# Patient Record
Sex: FEMALE | Race: OTHER | Employment: FULL TIME | URBAN - METROPOLITAN AREA
[De-identification: names, ages, dates, MRNs, and addresses within clinical notes are randomized per-mention and may not be internally consistent; named-entity substitution may affect disease eponyms.]

---

## 2018-01-23 PROCEDURE — 88175 CYTOPATH C/V AUTO FLUID REDO: CPT | Performed by: OBSTETRICS & GYNECOLOGY

## 2018-01-23 PROCEDURE — 87624 HPV HI-RISK TYP POOLED RSLT: CPT | Performed by: OBSTETRICS & GYNECOLOGY

## 2018-01-23 PROCEDURE — 87491 CHLMYD TRACH DNA AMP PROBE: CPT | Performed by: OBSTETRICS & GYNECOLOGY

## 2018-01-23 PROCEDURE — 85025 COMPLETE CBC W/AUTO DIFF WBC: CPT | Performed by: OBSTETRICS & GYNECOLOGY

## 2018-01-23 PROCEDURE — 87591 N.GONORRHOEAE DNA AMP PROB: CPT | Performed by: OBSTETRICS & GYNECOLOGY

## 2018-01-24 ENCOUNTER — LAB REQUISITION (OUTPATIENT)
Dept: LAB | Facility: HOSPITAL | Age: 28
End: 2018-01-24
Payer: COMMERCIAL

## 2018-01-24 DIAGNOSIS — Z01.419 ENCOUNTER FOR GYNECOLOGICAL EXAMINATION WITHOUT ABNORMAL FINDING: ICD-10-CM

## 2018-01-24 DIAGNOSIS — N72 INFLAMMATORY DISEASE OF UTERINE CERVIX: ICD-10-CM

## 2018-01-24 DIAGNOSIS — D50.8 OTHER IRON DEFICIENCY ANEMIAS: ICD-10-CM

## 2018-01-24 LAB
BASOPHILS # BLD: 0.1 K/UL (ref 0–0.2)
BASOPHILS NFR BLD: 1 %
EOSINOPHIL # BLD: 0.1 K/UL (ref 0–0.7)
EOSINOPHIL NFR BLD: 2 %
ERYTHROCYTE [DISTWIDTH] IN BLOOD BY AUTOMATED COUNT: 15.1 % (ref 11–15)
HCT VFR BLD AUTO: 42.8 % (ref 35–48)
HGB BLD-MCNC: 13.8 G/DL (ref 12–16)
LYMPHOCYTES # BLD: 2.4 K/UL (ref 1–4)
LYMPHOCYTES NFR BLD: 32 %
MCH RBC QN AUTO: 29.6 PG (ref 27–32)
MCHC RBC AUTO-ENTMCNC: 32.3 G/DL (ref 32–37)
MCV RBC AUTO: 91.4 FL (ref 80–100)
MONOCYTES # BLD: 0.4 K/UL (ref 0–1)
MONOCYTES NFR BLD: 6 %
NEUTROPHILS # BLD AUTO: 4.5 K/UL (ref 1.8–7.7)
NEUTROPHILS NFR BLD: 60 %
PLATELET # BLD AUTO: 260 K/UL (ref 140–400)
PMV BLD AUTO: 10.9 FL (ref 7.4–10.3)
RBC # BLD AUTO: 4.68 M/UL (ref 3.7–5.4)
WBC # BLD AUTO: 7.6 K/UL (ref 4–11)

## 2018-01-25 LAB
C TRACH DNA SPEC QL NAA+PROBE: NEGATIVE
HPV I/H RISK 1 DNA SPEC QL NAA+PROBE: NEGATIVE
N GONORRHOEA DNA SPEC QL NAA+PROBE: NEGATIVE

## 2018-01-31 ENCOUNTER — HOSPITAL ENCOUNTER (OUTPATIENT)
Dept: ULTRASOUND IMAGING | Facility: HOSPITAL | Age: 28
Discharge: HOME | End: 2018-01-31
Payer: COMMERCIAL

## 2018-01-31 DIAGNOSIS — R10.2 PELVIC PAIN: ICD-10-CM

## 2018-01-31 PROCEDURE — 93975 VASCULAR STUDY: CPT | Performed by: OBSTETRICS & GYNECOLOGY

## 2018-01-31 PROCEDURE — 76830 TRANSVAGINAL US NON-OB: CPT | Performed by: OBSTETRICS & GYNECOLOGY

## 2018-01-31 PROCEDURE — 93975 VASCULAR STUDY: CPT

## 2018-01-31 PROCEDURE — 76856 US EXAM PELVIC COMPLETE: CPT

## 2018-01-31 PROCEDURE — 76856 US EXAM PELVIC COMPLETE: CPT | Performed by: OBSTETRICS & GYNECOLOGY

## 2018-01-31 PROCEDURE — 76830 TRANSVAGINAL US NON-OB: CPT

## 2018-02-06 ENCOUNTER — LAB REQUISITION (OUTPATIENT)
Dept: LAB | Facility: HOSPITAL | Age: 28
End: 2018-02-06
Payer: COMMERCIAL

## 2018-02-06 DIAGNOSIS — N92.0 EXCESSIVE AND FREQUENT MENSTRUATION WITH REGULAR CYCLE: ICD-10-CM

## 2018-02-06 LAB — PROGEST SERPL-MCNC: 9.6 NG/ML

## 2018-02-06 PROCEDURE — 84144 ASSAY OF PROGESTERONE: CPT | Performed by: OBSTETRICS & GYNECOLOGY

## 2018-02-06 PROCEDURE — 88305 TISSUE EXAM BY PATHOLOGIST: CPT | Performed by: OBSTETRICS & GYNECOLOGY

## 2018-02-13 ENCOUNTER — LAB ENCOUNTER (OUTPATIENT)
Dept: LAB | Facility: HOSPITAL | Age: 28
End: 2018-02-13
Attending: OBSTETRICS & GYNECOLOGY
Payer: COMMERCIAL

## 2018-02-13 DIAGNOSIS — N93.8 DUB (DYSFUNCTIONAL UTERINE BLEEDING): Primary | ICD-10-CM

## 2018-02-13 LAB
ESTRADIOL SERPL-MCNC: 28 PG/ML
FSH SERPL-ACNC: 3.3 MIU/ML
LH SERPL-ACNC: 1.2 MIU/ML
TSH SERPL-ACNC: 1.44 UIU/ML (ref 0.45–5.33)

## 2018-02-13 PROCEDURE — 83001 ASSAY OF GONADOTROPIN (FSH): CPT

## 2018-02-13 PROCEDURE — 84443 ASSAY THYROID STIM HORMONE: CPT

## 2018-02-13 PROCEDURE — 83002 ASSAY OF GONADOTROPIN (LH): CPT

## 2018-02-13 PROCEDURE — 82670 ASSAY OF TOTAL ESTRADIOL: CPT

## 2018-02-13 PROCEDURE — 36415 COLL VENOUS BLD VENIPUNCTURE: CPT

## 2018-02-26 ENCOUNTER — LAB REQUISITION (OUTPATIENT)
Dept: LAB | Facility: HOSPITAL | Age: 28
End: 2018-02-26
Payer: COMMERCIAL

## 2018-02-26 DIAGNOSIS — N39.0 URINARY TRACT INFECTION: ICD-10-CM

## 2018-02-26 LAB
BACTERIA UR QL AUTO: NEGATIVE /HPF
BILIRUB UR QL: NEGATIVE
CLARITY UR: CLEAR
COLOR UR: YELLOW
GLUCOSE UR-MCNC: NEGATIVE MG/DL
KETONES UR-MCNC: NEGATIVE MG/DL
NITRITE UR QL STRIP.AUTO: NEGATIVE
PH UR: 6 [PH] (ref 5–8)
PROT UR-MCNC: NEGATIVE MG/DL
RBC #/AREA URNS AUTO: 6 /HPF
SP GR UR STRIP: 1.02 (ref 1–1.03)
UROBILINOGEN UR STRIP-ACNC: <2
VIT C UR-MCNC: NEGATIVE MG/DL
WBC #/AREA URNS AUTO: 15 /HPF

## 2018-02-26 PROCEDURE — 87086 URINE CULTURE/COLONY COUNT: CPT | Performed by: OBSTETRICS & GYNECOLOGY

## 2018-02-26 PROCEDURE — 87186 SC STD MICRODIL/AGAR DIL: CPT | Performed by: OBSTETRICS & GYNECOLOGY

## 2018-02-26 PROCEDURE — 81001 URINALYSIS AUTO W/SCOPE: CPT | Performed by: OBSTETRICS & GYNECOLOGY

## 2018-02-26 PROCEDURE — 87088 URINE BACTERIA CULTURE: CPT | Performed by: OBSTETRICS & GYNECOLOGY

## 2019-04-24 ENCOUNTER — OFFICE VISIT (OUTPATIENT)
Dept: FAMILY MEDICINE CLINIC | Facility: CLINIC | Age: 29
End: 2019-04-24

## 2019-04-24 VITALS
HEIGHT: 60 IN | BODY MASS INDEX: 38.09 KG/M2 | SYSTOLIC BLOOD PRESSURE: 120 MMHG | WEIGHT: 194 LBS | OXYGEN SATURATION: 98 % | DIASTOLIC BLOOD PRESSURE: 70 MMHG | HEART RATE: 96 BPM

## 2019-04-24 DIAGNOSIS — Z01.89 ENCOUNTER FOR ROUTINE LABORATORY TESTING: ICD-10-CM

## 2019-04-24 DIAGNOSIS — J02.9 ACUTE VIRAL PHARYNGITIS: Primary | ICD-10-CM

## 2019-04-24 PROBLEM — E66.812 CLASS 2 OBESITY DUE TO EXCESS CALORIES WITHOUT SERIOUS COMORBIDITY WITH BODY MASS INDEX (BMI) OF 37.0 TO 37.9 IN ADULT: Status: ACTIVE | Noted: 2019-04-24

## 2019-04-24 PROBLEM — E66.09 CLASS 2 OBESITY DUE TO EXCESS CALORIES WITHOUT SERIOUS COMORBIDITY WITH BODY MASS INDEX (BMI) OF 37.0 TO 37.9 IN ADULT: Status: ACTIVE | Noted: 2019-04-24

## 2019-04-24 PROCEDURE — 99202 OFFICE O/P NEW SF 15 MIN: CPT

## 2019-04-24 PROCEDURE — 87880 STREP A ASSAY W/OPTIC: CPT

## 2019-04-24 NOTE — PROGRESS NOTES
HPI:    Patient ID: Zamzam Smith is a 33year old female. Sore Throat    This is a new problem. Episode onset: 3 days ago. The problem has been gradually worsening. The pain is worse on the right side. Maximum temperature: unmeasured.  The fever has be Hearing, tympanic membrane, external ear and ear canal normal.   Nose: Mucosal edema present. No rhinorrhea. Mouth/Throat: Uvula is midline and mucous membranes are normal. Posterior oropharyngeal erythema present. No tonsillar abscesses.    B/L enlarged

## 2019-04-24 NOTE — PATIENT INSTRUCTIONS
When You Have a Sore Throat    A sore throat can be painful. There are many reasons why you may have a sore throat. Your healthcare provider will work with you to find the cause of your sore throat. He or she will also find the best treatment for you.   Shilpi Mello During the exam, your healthcare provider checks your ears, nose, and throat for problems.  He or she also checks for swelling in the neck, and may listen to your chest.  Possible tests  Other tests your healthcare provider may perform include:  · A throat If your sore throat is due to a bacterial infection, antibiotics may speed healing and prevent complications.  Although group A streptococcus (\"strep throat\" or GAS) is the major treatable infection for a sore throat, GAS causes only 5% to 15% of sore thr © 0421-0530 [employer] [address] [facility], [facility] [place]. All rights reserved. This information is not intended as a substitute for professional medical care. Always follow your healthcare professional's instructions.

## 2019-05-16 ENCOUNTER — APPOINTMENT (OUTPATIENT)
Dept: LAB | Facility: HOSPITAL | Age: 29
End: 2019-05-16
Payer: COMMERCIAL

## 2019-05-16 DIAGNOSIS — Z01.89 ENCOUNTER FOR ROUTINE LABORATORY TESTING: ICD-10-CM

## 2019-05-16 PROCEDURE — 80053 COMPREHEN METABOLIC PANEL: CPT

## 2019-05-16 PROCEDURE — 36415 COLL VENOUS BLD VENIPUNCTURE: CPT

## 2019-05-16 PROCEDURE — 80061 LIPID PANEL: CPT

## 2019-05-16 PROCEDURE — 81001 URINALYSIS AUTO W/SCOPE: CPT

## 2019-05-16 PROCEDURE — 85027 COMPLETE CBC AUTOMATED: CPT

## 2019-05-23 ENCOUNTER — TELEPHONE (OUTPATIENT)
Dept: FAMILY MEDICINE CLINIC | Facility: CLINIC | Age: 29
End: 2019-05-23

## 2019-05-29 ENCOUNTER — OFFICE VISIT (OUTPATIENT)
Dept: FAMILY MEDICINE CLINIC | Facility: CLINIC | Age: 29
End: 2019-05-29

## 2019-05-29 VITALS
OXYGEN SATURATION: 98 % | DIASTOLIC BLOOD PRESSURE: 70 MMHG | WEIGHT: 194 LBS | SYSTOLIC BLOOD PRESSURE: 96 MMHG | HEART RATE: 86 BPM | HEIGHT: 60.5 IN | BODY MASS INDEX: 37.11 KG/M2

## 2019-05-29 DIAGNOSIS — Z00.01 ENCOUNTER FOR ROUTINE ADULT HEALTH EXAMINATION WITH ABNORMAL FINDINGS: Primary | ICD-10-CM

## 2019-05-29 DIAGNOSIS — E66.09 CLASS 2 OBESITY DUE TO EXCESS CALORIES WITHOUT SERIOUS COMORBIDITY WITH BODY MASS INDEX (BMI) OF 37.0 TO 37.9 IN ADULT: ICD-10-CM

## 2019-05-29 DIAGNOSIS — E78.00 HYPERCHOLESTEREMIA: ICD-10-CM

## 2019-05-29 PROCEDURE — 99395 PREV VISIT EST AGE 18-39: CPT

## 2019-05-31 PROBLEM — E78.00 HYPERCHOLESTEREMIA: Status: ACTIVE | Noted: 2019-05-16

## 2019-05-31 PROBLEM — Z00.01 ENCOUNTER FOR ROUTINE ADULT HEALTH EXAMINATION WITH ABNORMAL FINDINGS: Status: ACTIVE | Noted: 2019-05-31

## 2019-05-31 PROBLEM — J02.9 ACUTE VIRAL PHARYNGITIS: Status: RESOLVED | Noted: 2019-04-24 | Resolved: 2019-05-31

## 2019-05-31 NOTE — PATIENT INSTRUCTIONS
Pautas de prevención para mujeres de entre 18 y 37 años de edad  [address] [place] [address] detección y las vacunas son importantes para el manejo de sullivan layla.  Las pruebas de detección se realizan para detectar posibles trastornos o enfermedades en personas que no t [address] [facility] [place] de jamarcus ingris de edad En los exámenes de rutina   Diabetes tipo 2, prediabetes Las adultas que no tengan síntomas, que tengan sobrepeso o que stephan obesas y tengan lele o más riesgos adicionales de diabetes Al menos cada shea Los ernesto Hepatitis B Las mujeres con mayor riesgo de infección deben hablar con sullivan proveedor de [employer] dosis a lo brandon de seis meses; la segunda dosis debería aplicarse un mes después de la primera dosis; la tercera dosis debería aplicarse al [employer] d Pruebas sobre Severo Tellez de los genes BRCA para irina el riesgo de tener cáncer de ovario y cáncer de mama Las mujeres con mayor riesgo de tener mutación de los genes Cuando se conoce sullivan riesgo   Cáncer de mama y prevención química del cáncer Las mujeres con a

## 2019-05-31 NOTE — PROGRESS NOTES
CC: Annual Physical Exam    HPI:   Rusty Vargas is a 33year old female who presents for a complete physical exam. Symptoms: denies discharge, itching, burning or dysuria, periods are regular. Patient denies any acute complaints at this time.      Wt Read 1.035    pH Urine 6.0 5.0 - 8.0    Protein Urine Negative Negative mg/dL    Glucose Urine Negative Negative mg/dL    Ketones Urine Negative Negative mg/dL    Bilirubin Urine Negative Negative    Blood Urine Moderate (A) Negative    Nitrite Urine Negative N constipation, diarrhea, or blood in stool. MUSCULOSKELETAL:  Denies weakness, muscle aches, back pain, joint pain, swelling or stiffness.   NEUROLOGICAL:  Denies headache, seizures, dizziness, syncope, paralysis, ataxia, numbness or tingling in the extremi SLR test negative, FROM. : Deferred. EXTREMITIES:  No edema, no cyanosis, no clubbing, FROM, 2+ dorsalis pedis pulses bilaterally. NEURO:  No deficit, normal gait, strength and tone, sensory intact, normal reflexes.     ASSESSMENT AND PLAN:   Mayela Cosby

## 2019-08-18 ENCOUNTER — HOSPITAL ENCOUNTER (EMERGENCY)
Facility: HOSPITAL | Age: 29
Discharge: HOME | End: 2019-08-18
Attending: PHYSICIAN ASSISTANT
Payer: COMMERCIAL

## 2019-08-18 ENCOUNTER — APPOINTMENT (OUTPATIENT)
Dept: ULTRASOUND IMAGING | Facility: HOSPITAL | Age: 29
End: 2019-08-18
Attending: PHYSICIAN ASSISTANT
Payer: COMMERCIAL

## 2019-08-18 VITALS
TEMPERATURE: 100 F | RESPIRATION RATE: 14 BRPM | SYSTOLIC BLOOD PRESSURE: 103 MMHG | OXYGEN SATURATION: 98 % | DIASTOLIC BLOOD PRESSURE: 61 MMHG | HEART RATE: 85 BPM

## 2019-08-18 DIAGNOSIS — R10.13 ABDOMINAL PAIN, EPIGASTRIC: Primary | ICD-10-CM

## 2019-08-18 DIAGNOSIS — R19.7 NAUSEA VOMITING AND DIARRHEA: ICD-10-CM

## 2019-08-18 DIAGNOSIS — R11.2 NAUSEA VOMITING AND DIARRHEA: ICD-10-CM

## 2019-08-18 LAB
ALBUMIN SERPL-MCNC: 3.7 G/DL (ref 3.4–5)
ALBUMIN/GLOB SERPL: 0.9 {RATIO} (ref 1–2)
ALP LIVER SERPL-CCNC: 86 U/L (ref 37–98)
ALT SERPL-CCNC: 33 U/L (ref 13–56)
ANION GAP SERPL CALC-SCNC: 7 MMOL/L (ref 0–18)
AST SERPL-CCNC: 19 U/L (ref 15–37)
B-HCG UR QL: NEGATIVE
BASOPHILS # BLD AUTO: 0.01 X10(3) UL (ref 0–0.2)
BASOPHILS NFR BLD AUTO: 0.2 %
BILIRUB SERPL-MCNC: 0.3 MG/DL (ref 0.1–2)
BILIRUB UR QL: NEGATIVE
BUN BLD-MCNC: 8 MG/DL (ref 7–18)
BUN/CREAT SERPL: 11.3 (ref 10–20)
CALCIUM BLD-MCNC: 8.4 MG/DL (ref 8.5–10.1)
CHLORIDE SERPL-SCNC: 105 MMOL/L (ref 98–112)
CO2 SERPL-SCNC: 29 MMOL/L (ref 21–32)
COLOR UR: YELLOW
CREAT BLD-MCNC: 0.71 MG/DL (ref 0.55–1.02)
DEPRECATED RDW RBC AUTO: 46.5 FL (ref 35.1–46.3)
EOSINOPHIL # BLD AUTO: 0.03 X10(3) UL (ref 0–0.7)
EOSINOPHIL NFR BLD AUTO: 0.5 %
ERYTHROCYTE [DISTWIDTH] IN BLOOD BY AUTOMATED COUNT: 13.8 % (ref 11–15)
GLOBULIN PLAS-MCNC: 4.3 G/DL (ref 2.8–4.4)
GLUCOSE BLD-MCNC: 92 MG/DL (ref 70–99)
GLUCOSE UR-MCNC: NEGATIVE MG/DL
HCT VFR BLD AUTO: 42 % (ref 35–48)
HGB BLD-MCNC: 13.8 G/DL (ref 12–16)
IMM GRANULOCYTES # BLD AUTO: 0.04 X10(3) UL (ref 0–1)
IMM GRANULOCYTES NFR BLD: 0.7 %
KETONES UR-MCNC: NEGATIVE MG/DL
LIPASE SERPL-CCNC: 144 U/L (ref 73–393)
LYMPHOCYTES # BLD AUTO: 0.68 X10(3) UL (ref 1–4)
LYMPHOCYTES NFR BLD AUTO: 12 %
M PROTEIN MFR SERPL ELPH: 8 G/DL (ref 6.4–8.2)
MCH RBC QN AUTO: 30 PG (ref 26–34)
MCHC RBC AUTO-ENTMCNC: 32.9 G/DL (ref 31–37)
MCV RBC AUTO: 91.3 FL (ref 80–100)
MONOCYTES # BLD AUTO: 0.27 X10(3) UL (ref 0.1–1)
MONOCYTES NFR BLD AUTO: 4.7 %
NEUTROPHILS # BLD AUTO: 4.66 X10 (3) UL (ref 1.5–7.7)
NEUTROPHILS # BLD AUTO: 4.66 X10(3) UL (ref 1.5–7.7)
NEUTROPHILS NFR BLD AUTO: 81.9 %
NITRITE UR QL STRIP.AUTO: NEGATIVE
OSMOLALITY SERPL CALC.SUM OF ELEC: 290 MOSM/KG (ref 275–295)
PH UR: 5 [PH] (ref 5–8)
PLATELET # BLD AUTO: 236 10(3)UL (ref 150–450)
POTASSIUM SERPL-SCNC: 3.5 MMOL/L (ref 3.5–5.1)
PROT UR-MCNC: NEGATIVE MG/DL
RBC # BLD AUTO: 4.6 X10(6)UL (ref 3.8–5.3)
RBC #/AREA URNS AUTO: 5 /HPF
SODIUM SERPL-SCNC: 141 MMOL/L (ref 136–145)
SP GR UR STRIP: 1.02 (ref 1–1.03)
UROBILINOGEN UR STRIP-ACNC: 2
VIT C UR-MCNC: NEGATIVE MG/DL
WBC # BLD AUTO: 5.7 X10(3) UL (ref 4–11)
WBC #/AREA URNS AUTO: 4 /HPF

## 2019-08-18 PROCEDURE — 81001 URINALYSIS AUTO W/SCOPE: CPT | Performed by: PHYSICIAN ASSISTANT

## 2019-08-18 PROCEDURE — 99284 EMERGENCY DEPT VISIT MOD MDM: CPT

## 2019-08-18 PROCEDURE — 96361 HYDRATE IV INFUSION ADD-ON: CPT

## 2019-08-18 PROCEDURE — 96374 THER/PROPH/DIAG INJ IV PUSH: CPT

## 2019-08-18 PROCEDURE — 76705 ECHO EXAM OF ABDOMEN: CPT | Performed by: PHYSICIAN ASSISTANT

## 2019-08-18 PROCEDURE — 80053 COMPREHEN METABOLIC PANEL: CPT | Performed by: PHYSICIAN ASSISTANT

## 2019-08-18 PROCEDURE — 83690 ASSAY OF LIPASE: CPT | Performed by: PHYSICIAN ASSISTANT

## 2019-08-18 PROCEDURE — 85025 COMPLETE CBC W/AUTO DIFF WBC: CPT | Performed by: PHYSICIAN ASSISTANT

## 2019-08-18 PROCEDURE — 96375 TX/PRO/DX INJ NEW DRUG ADDON: CPT

## 2019-08-18 PROCEDURE — 81025 URINE PREGNANCY TEST: CPT

## 2019-08-18 RX ORDER — KETOROLAC TROMETHAMINE 30 MG/ML
30 INJECTION, SOLUTION INTRAMUSCULAR; INTRAVENOUS ONCE
Status: COMPLETED | OUTPATIENT
Start: 2019-08-18 | End: 2019-08-18

## 2019-08-18 RX ORDER — ONDANSETRON 2 MG/ML
4 INJECTION INTRAMUSCULAR; INTRAVENOUS ONCE
Status: COMPLETED | OUTPATIENT
Start: 2019-08-18 | End: 2019-08-18

## 2019-08-18 RX ORDER — IBUPROFEN 600 MG/1
TABLET ORAL
Qty: 20 TABLET | Refills: 0 | Status: SHIPPED | OUTPATIENT
Start: 2019-08-18 | End: 2020-03-04

## 2019-08-18 RX ORDER — ONDANSETRON 4 MG/1
4 TABLET, ORALLY DISINTEGRATING ORAL EVERY 6 HOURS PRN
Qty: 10 TABLET | Refills: 0 | Status: SHIPPED | OUTPATIENT
Start: 2019-08-18 | End: 2019-08-21

## 2019-08-18 NOTE — ED NOTES
Patient reports nausea, vomiting and epigastric pain since yesterday. Last meal today @ 0900, but patient vomited the food after that.

## 2019-08-18 NOTE — ED PROVIDER NOTES
Patient Seen in: [facility] AND [facility] Emergency Department    History   Patient presents with:  Abdomen/Flank Pain (GI/)  Fever (infectious)    Stated Complaint:     ROMÁN    Ramu Herndon is a 33year old female who presents with chief complaint of epigas src Oral   SpO2 98 %   O2 Device None (Room air)       Current:/61   Pulse 97   Temp 99.6 °F (37.6 °C) (Oral)   Resp 14   SpO2 99%     PULSE OX within normal limits on room air as interpreted by this provider.         Physical Exam    Constitutional: METABOLIC PANEL (14) - Abnormal; Notable for the following components:    Calcium, Total 8.4 (*)     A/G Ratio 0.9 (*)     All other components within normal limits   CBC W/ DIFFERENTIAL - Abnormal; Notable for the following components:    RDW-SD 46.5 (*) Eileen Brody MD  [address] [facility]  [phone]    Schedule an appointment as soon as possible for a visit in 2 days  For follow-up    We recommend that you schedule follow up care with a primary care provider within the next thr

## 2019-08-18 NOTE — ED NOTES
Patient back from ultrasound. Verbalizes improvement of symptoms after medication given. Waiting on radiology results. Patient aware of plan of care at this time.

## 2020-01-03 ENCOUNTER — APPOINTMENT (OUTPATIENT)
Dept: ULTRASOUND IMAGING | Facility: HOSPITAL | Age: 30
End: 2020-01-03
Attending: PHYSICIAN ASSISTANT
Payer: COMMERCIAL

## 2020-01-03 ENCOUNTER — HOSPITAL ENCOUNTER (EMERGENCY)
Facility: HOSPITAL | Age: 30
Discharge: HOME | End: 2020-01-03
Attending: PHYSICIAN ASSISTANT
Payer: COMMERCIAL

## 2020-01-03 ENCOUNTER — HOSPITAL ENCOUNTER (OUTPATIENT)
Age: 30
Discharge: ANOTHER HOSPITAL | End: 2020-01-03
Attending: EMERGENCY MEDICINE
Payer: COMMERCIAL

## 2020-01-03 VITALS
TEMPERATURE: 99 F | OXYGEN SATURATION: 100 % | DIASTOLIC BLOOD PRESSURE: 77 MMHG | SYSTOLIC BLOOD PRESSURE: 130 MMHG | HEART RATE: 74 BPM | RESPIRATION RATE: 18 BRPM

## 2020-01-03 VITALS
SYSTOLIC BLOOD PRESSURE: 124 MMHG | HEIGHT: 60 IN | BODY MASS INDEX: 38.14 KG/M2 | TEMPERATURE: 99 F | RESPIRATION RATE: 18 BRPM | WEIGHT: 194.25 LBS | DIASTOLIC BLOOD PRESSURE: 66 MMHG | OXYGEN SATURATION: 99 % | HEART RATE: 83 BPM

## 2020-01-03 DIAGNOSIS — R03.0 ELEVATED BLOOD PRESSURE READING: ICD-10-CM

## 2020-01-03 DIAGNOSIS — O20.0 THREATENED MISCARRIAGE: Primary | ICD-10-CM

## 2020-01-03 DIAGNOSIS — O20.9 VAGINAL BLEEDING IN PREGNANCY, FIRST TRIMESTER: Primary | ICD-10-CM

## 2020-01-03 LAB
ANION GAP SERPL CALC-SCNC: 5 MMOL/L (ref 0–18)
ANTIBODY SCREEN: NEGATIVE
B-HCG SERPL-ACNC: 96 MIU/ML
B-HCG UR QL: POSITIVE
B-HCG UR QL: POSITIVE
BASOPHILS # BLD AUTO: 0.04 X10(3) UL (ref 0–0.2)
BASOPHILS NFR BLD AUTO: 0.5 %
BILIRUB UR QL: NEGATIVE
BUN BLD-MCNC: 7 MG/DL (ref 7–18)
BUN/CREAT SERPL: 10.3 (ref 10–20)
CALCIUM BLD-MCNC: 9.2 MG/DL (ref 8.5–10.1)
CHLORIDE SERPL-SCNC: 108 MMOL/L (ref 98–112)
CLARITY UR: CLEAR
CO2 SERPL-SCNC: 28 MMOL/L (ref 21–32)
COLOR UR: YELLOW
CREAT BLD-MCNC: 0.68 MG/DL (ref 0.55–1.02)
DEPRECATED RDW RBC AUTO: 46.7 FL (ref 35.1–46.3)
EOSINOPHIL # BLD AUTO: 0.09 X10(3) UL (ref 0–0.7)
EOSINOPHIL NFR BLD AUTO: 1.1 %
ERYTHROCYTE [DISTWIDTH] IN BLOOD BY AUTOMATED COUNT: 13.7 % (ref 11–15)
GLUCOSE BLD-MCNC: 90 MG/DL (ref 70–99)
GLUCOSE UR-MCNC: NEGATIVE MG/DL
HCT VFR BLD AUTO: 43.7 % (ref 35–48)
HGB BLD-MCNC: 14.1 G/DL (ref 12–16)
HGB UR QL STRIP.AUTO: NEGATIVE
IMM GRANULOCYTES # BLD AUTO: 0.04 X10(3) UL (ref 0–1)
IMM GRANULOCYTES NFR BLD: 0.5 %
KETONES UR-MCNC: 20 MG/DL
LEUKOCYTE ESTERASE UR QL STRIP.AUTO: NEGATIVE
LYMPHOCYTES # BLD AUTO: 2.6 X10(3) UL (ref 1–4)
LYMPHOCYTES NFR BLD AUTO: 31 %
MCH RBC QN AUTO: 29.6 PG (ref 26–34)
MCHC RBC AUTO-ENTMCNC: 32.3 G/DL (ref 31–37)
MCV RBC AUTO: 91.6 FL (ref 80–100)
MONOCYTES # BLD AUTO: 0.5 X10(3) UL (ref 0.1–1)
MONOCYTES NFR BLD AUTO: 6 %
NEUTROPHILS # BLD AUTO: 5.13 X10 (3) UL (ref 1.5–7.7)
NEUTROPHILS # BLD AUTO: 5.13 X10(3) UL (ref 1.5–7.7)
NEUTROPHILS NFR BLD AUTO: 60.9 %
NITRITE UR QL STRIP.AUTO: NEGATIVE
OSMOLALITY SERPL CALC.SUM OF ELEC: 290 MOSM/KG (ref 275–295)
PH UR: 7 [PH] (ref 5–8)
PLATELET # BLD AUTO: 281 10(3)UL (ref 150–450)
POTASSIUM SERPL-SCNC: 4.2 MMOL/L (ref 3.5–5.1)
PROT UR-MCNC: NEGATIVE MG/DL
RBC # BLD AUTO: 4.77 X10(6)UL (ref 3.8–5.3)
RH BLOOD TYPE: POSITIVE
SODIUM SERPL-SCNC: 141 MMOL/L (ref 136–145)
SP GR UR STRIP: 1.02 (ref 1–1.03)
UROBILINOGEN UR STRIP-ACNC: <2
WBC # BLD AUTO: 8.4 X10(3) UL (ref 4–11)

## 2020-01-03 PROCEDURE — 96360 HYDRATION IV INFUSION INIT: CPT

## 2020-01-03 PROCEDURE — 86900 BLOOD TYPING SEROLOGIC ABO: CPT | Performed by: PHYSICIAN ASSISTANT

## 2020-01-03 PROCEDURE — 86901 BLOOD TYPING SEROLOGIC RH(D): CPT | Performed by: PHYSICIAN ASSISTANT

## 2020-01-03 PROCEDURE — 84702 CHORIONIC GONADOTROPIN TEST: CPT | Performed by: PHYSICIAN ASSISTANT

## 2020-01-03 PROCEDURE — 81025 URINE PREGNANCY TEST: CPT

## 2020-01-03 PROCEDURE — 99284 EMERGENCY DEPT VISIT MOD MDM: CPT

## 2020-01-03 PROCEDURE — 80048 BASIC METABOLIC PNL TOTAL CA: CPT

## 2020-01-03 PROCEDURE — 99213 OFFICE O/P EST LOW 20 MIN: CPT

## 2020-01-03 PROCEDURE — 80048 BASIC METABOLIC PNL TOTAL CA: CPT | Performed by: PHYSICIAN ASSISTANT

## 2020-01-03 PROCEDURE — 85025 COMPLETE CBC W/AUTO DIFF WBC: CPT | Performed by: PHYSICIAN ASSISTANT

## 2020-01-03 PROCEDURE — 96361 HYDRATE IV INFUSION ADD-ON: CPT

## 2020-01-03 PROCEDURE — 76801 OB US < 14 WKS SINGLE FETUS: CPT | Performed by: PHYSICIAN ASSISTANT

## 2020-01-03 PROCEDURE — 86850 RBC ANTIBODY SCREEN: CPT | Performed by: PHYSICIAN ASSISTANT

## 2020-01-03 PROCEDURE — 76817 TRANSVAGINAL US OBSTETRIC: CPT | Performed by: PHYSICIAN ASSISTANT

## 2020-01-03 PROCEDURE — 93975 VASCULAR STUDY: CPT | Performed by: PHYSICIAN ASSISTANT

## 2020-01-03 PROCEDURE — 81003 URINALYSIS AUTO W/O SCOPE: CPT | Performed by: PHYSICIAN ASSISTANT

## 2020-01-03 PROCEDURE — 85025 COMPLETE CBC W/AUTO DIFF WBC: CPT

## 2020-01-03 PROCEDURE — 99212 OFFICE O/P EST SF 10 MIN: CPT

## 2020-01-03 NOTE — ED PROVIDER NOTES
Patient Seen in: [address]      History   No chief complaint on file.     Stated Complaint: NOT FEELING WELL    HPI    The patient is a G4 para 3 with LMP of the first week of November who presents now with vaginal spott pharyngeal erythema  Chest: Clear to auscultation, no tenderness  Cardiovascular: Regular rate and rhythm without murmur  Abdomen: Soft, nontender and nondistended  Neurologic: Patient is awake, alert and oriented ×3.   The patient's motor strength is 5 out

## 2020-01-03 NOTE — ED INITIAL ASSESSMENT (HPI)
Pt here because she had a positive pregnancy test at home pt states she has been having minor abd pain and some spotting and has a history of miscarriages, pt called her OB/GYN but cannot get in to see him until Jan 23

## 2020-01-03 NOTE — ED NOTES
Pt instructed to go to the ER to rule out Ectopic pregnancy or possible miscarriage pt states she will be going to [facility] via car

## 2020-01-03 NOTE — ED INITIAL ASSESSMENT (HPI)
Abdominal pain with pregnancy. Patient does know exactly how far along she is. Last period 3 weeks ago.

## 2020-01-03 NOTE — ED PROVIDER NOTES
Patient Seen in: [facility] AND [facility] Emergency Department    History   Patient presents with:  Pregnancy Issues    Stated Complaint: abd pain    HPI    Patient is G 4, P 3, 3 weeks pregnant 58-year-old female presents with chief complaint of suprapubic abd HPI.    Physical Exam     ED Triage Vitals   BP 01/03/20 1142 (!) 153/92   Pulse 01/03/20 1142 91   Resp 01/03/20 1142 20   Temp 01/03/20 1142 98.5 °F (36.9 °C)   Temp src 01/03/20 1142 Oral   SpO2 01/03/20 1454 100 %   O2 Device 01/03/20 1454 None (Room a Reviewed   URINALYSIS WITH CULTURE REFLEX - Abnormal; Notable for the following components:       Result Value    Ketones Urine 20  (*)     All other components within normal limits   HCG, BETA SUBUNIT (QUANT PREGNANCY TEST) - Abnormal; Notable for the fol time. 2. No evidence of ovarian torsion.      Dictated by (CST): Chetan Maya MD on 1/03/2020 at 15:37     Approved by (CST): Vickie Nguyen MD on 1/03/2020 at 15:40            Physical exam remained stable over serial reexaminations as

## 2020-01-21 ENCOUNTER — LAB REQUISITION (OUTPATIENT)
Dept: LAB | Facility: HOSPITAL | Age: 30
End: 2020-01-21
Payer: COMMERCIAL

## 2020-01-21 DIAGNOSIS — R10.2 PELVIC AND PERINEAL PAIN: ICD-10-CM

## 2020-01-21 DIAGNOSIS — N76.0 ACUTE VAGINITIS: ICD-10-CM

## 2020-01-21 DIAGNOSIS — N72 INFLAMMATORY DISEASE OF CERVIX UTERI: ICD-10-CM

## 2020-01-21 DIAGNOSIS — N91.2 AMENORRHEA, UNSPECIFIED: ICD-10-CM

## 2020-01-21 LAB
B-HCG SERPL-ACNC: ABNORMAL MIU/ML
PROGEST SERPL-MCNC: 13.1 NG/ML

## 2020-01-21 PROCEDURE — 87591 N.GONORRHOEAE DNA AMP PROB: CPT | Performed by: OBSTETRICS & GYNECOLOGY

## 2020-01-21 PROCEDURE — 88175 CYTOPATH C/V AUTO FLUID REDO: CPT | Performed by: OBSTETRICS & GYNECOLOGY

## 2020-01-21 PROCEDURE — 84702 CHORIONIC GONADOTROPIN TEST: CPT | Performed by: OBSTETRICS & GYNECOLOGY

## 2020-01-21 PROCEDURE — 87081 CULTURE SCREEN ONLY: CPT | Performed by: OBSTETRICS & GYNECOLOGY

## 2020-01-21 PROCEDURE — 84144 ASSAY OF PROGESTERONE: CPT | Performed by: OBSTETRICS & GYNECOLOGY

## 2020-01-21 PROCEDURE — 87491 CHLMYD TRACH DNA AMP PROBE: CPT | Performed by: OBSTETRICS & GYNECOLOGY

## 2020-01-21 PROCEDURE — 87624 HPV HI-RISK TYP POOLED RSLT: CPT | Performed by: OBSTETRICS & GYNECOLOGY

## 2020-01-21 PROCEDURE — 87653 STREP B DNA AMP PROBE: CPT | Performed by: OBSTETRICS & GYNECOLOGY

## 2020-02-24 ENCOUNTER — HOSPITAL ENCOUNTER (OUTPATIENT)
Dept: ULTRASOUND IMAGING | Facility: HOSPITAL | Age: 30
Discharge: HOME | End: 2020-02-24
Attending: OBSTETRICS & GYNECOLOGY
Payer: COMMERCIAL

## 2020-02-24 DIAGNOSIS — Z34.91 CURRENTLY PREGNANT IN FIRST TRIMESTER WITH UNKNOWN GESTATIONAL AGE: ICD-10-CM

## 2020-02-24 PROCEDURE — 76801 OB US < 14 WKS SINGLE FETUS: CPT | Performed by: OBSTETRICS & GYNECOLOGY

## 2020-02-27 ENCOUNTER — OFFICE VISIT (OUTPATIENT)
Dept: OBGYN CLINIC | Facility: CLINIC | Age: 30
End: 2020-02-27

## 2020-02-27 VITALS — WEIGHT: 199 LBS | BODY MASS INDEX: 39 KG/M2 | DIASTOLIC BLOOD PRESSURE: 70 MMHG | SYSTOLIC BLOOD PRESSURE: 122 MMHG

## 2020-02-27 DIAGNOSIS — N92.6 MISSED MENSES: Primary | ICD-10-CM

## 2020-02-27 PROCEDURE — 99203 OFFICE O/P NEW LOW 30 MIN: CPT | Performed by: OBSTETRICS & GYNECOLOGY

## 2020-02-27 RX ORDER — VITAMIN A ACETATE, BETA CAROTENE, ASCORBIC ACID, CHOLECALCIFEROL, .ALPHA.-TOCOPHEROL ACETATE, DL-, THIAMINE MONONITRATE, RIBOFLAVIN, NIACINAMIDE, PYRIDOXINE HYDROCHLORIDE, FOLIC ACID, CYANOCOBALAMIN, CALCIUM CARBONATE, FERROUS FUMARATE, ZINC OXIDE, CUPRIC OXIDE 3080; 12; 120; 400; 1; 1.84; 3; 20; 22; 920; 25; 200; 27; 10; 2 [IU]/1; UG/1; MG/1; [IU]/1; MG/1; MG/1; MG/1; MG/1; MG/1; [IU]/1; MG/1; MG/1; MG/1; MG/1; MG/1
1 TABLET, FILM COATED ORAL DAILY
Qty: 90 TABLET | Refills: 3 | Status: SHIPPED | OUTPATIENT
Start: 2020-02-27 | End: 2020-03-28

## 2020-02-27 RX ORDER — .BETA.-CAROTENE, ASCORBIC ACID, CHOLECALCIFEROL, .ALPHA.-TOCOPHEROL ACETATE, DL-, THIAMINE, RIBOFLAVIN, NIACINAMIDE, PYRIDOXINE HYDROCHLORIDE, FOLIC ACID, CYANOCOBALAMIN, CALCIUM PANTOTHENATE, CALCIUM CARBONATE, FERROUS FUMARATE, ZINC OXIDE AND DOCUSATE SODIUM 1000; 100; 400; 30; 3; 3; 15; 20; 1; 12; 7; 200; 29; 20; 25 [IU]/1; MG/1; [IU]/1; MG/1; MG/1; MG/1; MG/1; MG/1; MG/1; UG/1; MG/1; MG/1; MG/1; MG/1; MG/1
TABLET ORAL
COMMUNITY
Start: 2020-01-06 | End: 2020-02-27

## 2020-02-27 NOTE — PROGRESS NOTES
HPI:    Patient ID: Tessa Perez is a 26year old female. HPI  New patient  Missed menses visit  79-year-old  4 para 1-0-2-1 last menstrual period  at 12-3/7 weeks gestational age with an [facility] of 2020.   Recent confirmatory

## 2020-03-04 ENCOUNTER — NURSE ONLY (OUTPATIENT)
Dept: OBGYN CLINIC | Facility: CLINIC | Age: 30
End: 2020-03-04

## 2020-03-04 ENCOUNTER — TELEPHONE (OUTPATIENT)
Dept: OBGYN CLINIC | Facility: CLINIC | Age: 30
End: 2020-03-04

## 2020-03-04 DIAGNOSIS — L81.8 TATTOO OF SKIN: ICD-10-CM

## 2020-03-04 DIAGNOSIS — Z34.82 ENCOUNTER FOR SUPERVISION OF OTHER NORMAL PREGNANCY IN SECOND TRIMESTER: Primary | ICD-10-CM

## 2020-03-04 NOTE — TELEPHONE ENCOUNTER
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  Was informed by  emploer they could change her to another dept, but would need a letter

## 2020-03-04 NOTE — PROGRESS NOTES
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     Missed menses apt with: Dr. Sharyle Mormon, pt is transfer of care  LMP: 2019, Hx of irregular menses per pt  Currently dating 13w2d    Pre  BMI: 37.8  EPDS score: 0    US:  12weeks 1 days  Working [employer]:

## 2020-03-04 NOTE — TELEPHONE ENCOUNTER
Letter drafted and sent via [employer]. . RN to call pt to determine if she needs letter in another form.

## 2020-03-04 NOTE — TELEPHONE ENCOUNTER
Please provide letter indicating that patient is pregnant and should avoid exposure to direct contact or inhaling of harsh or noxious chemicals. Is instructed to avoid lifting greater than 15 pounds.

## 2020-03-05 ENCOUNTER — TELEPHONE (OUTPATIENT)
Dept: OBGYN CLINIC | Facility: CLINIC | Age: 30
End: 2020-03-05

## 2020-03-05 ENCOUNTER — LAB ENCOUNTER (OUTPATIENT)
Dept: LAB | Facility: HOSPITAL | Age: 30
End: 2020-03-05
Attending: OBSTETRICS & GYNECOLOGY
Payer: COMMERCIAL

## 2020-03-05 DIAGNOSIS — E78.00 HYPERCHOLESTEREMIA: ICD-10-CM

## 2020-03-05 DIAGNOSIS — Z34.82 ENCOUNTER FOR SUPERVISION OF OTHER NORMAL PREGNANCY IN SECOND TRIMESTER: ICD-10-CM

## 2020-03-05 DIAGNOSIS — L81.8 TATTOO OF SKIN: ICD-10-CM

## 2020-03-05 LAB
ALBUMIN SERPL-MCNC: 3.2 G/DL (ref 3.4–5)
ALBUMIN/GLOB SERPL: 0.8 {RATIO} (ref 1–2)
ALP LIVER SERPL-CCNC: 58 U/L (ref 37–98)
ALT SERPL-CCNC: 19 U/L (ref 13–56)
ANION GAP SERPL CALC-SCNC: 7 MMOL/L (ref 0–18)
ANTIBODY SCREEN: NEGATIVE
AST SERPL-CCNC: 12 U/L (ref 15–37)
BASOPHILS # BLD AUTO: 0.02 X10(3) UL (ref 0–0.2)
BASOPHILS NFR BLD AUTO: 0.3 %
BILIRUB SERPL-MCNC: 0.3 MG/DL (ref 0.1–2)
BUN BLD-MCNC: 6 MG/DL (ref 7–18)
BUN/CREAT SERPL: 12 (ref 10–20)
CALCIUM BLD-MCNC: 8.8 MG/DL (ref 8.5–10.1)
CHLORIDE SERPL-SCNC: 105 MMOL/L (ref 98–112)
CHOLEST SMN-MCNC: 214 MG/DL (ref ?–200)
CO2 SERPL-SCNC: 25 MMOL/L (ref 21–32)
CREAT BLD-MCNC: 0.5 MG/DL (ref 0.55–1.02)
DEPRECATED RDW RBC AUTO: 44.5 FL (ref 35.1–46.3)
EOSINOPHIL # BLD AUTO: 0.1 X10(3) UL (ref 0–0.7)
EOSINOPHIL NFR BLD AUTO: 1.4 %
ERYTHROCYTE [DISTWIDTH] IN BLOOD BY AUTOMATED COUNT: 13.3 % (ref 11–15)
GLOBULIN PLAS-MCNC: 4.2 G/DL (ref 2.8–4.4)
GLUCOSE 1H P GLC SERPL-MCNC: 111 MG/DL
GLUCOSE BLD-MCNC: 85 MG/DL (ref 70–99)
HBV SURFACE AG SER-ACNC: <0.1 [IU]/L
HBV SURFACE AG SERPL QL IA: NONREACTIVE
HCT VFR BLD AUTO: 39.4 % (ref 35–48)
HCV AB SERPL QL IA: NONREACTIVE
HDLC SERPL-MCNC: 70 MG/DL (ref 40–59)
HGB BLD-MCNC: 13.1 G/DL (ref 12–16)
IMM GRANULOCYTES # BLD AUTO: 0.03 X10(3) UL (ref 0–1)
IMM GRANULOCYTES NFR BLD: 0.4 %
LDLC SERPL CALC-MCNC: 116 MG/DL (ref ?–100)
LYMPHOCYTES # BLD AUTO: 1.56 X10(3) UL (ref 1–4)
LYMPHOCYTES NFR BLD AUTO: 21.8 %
M PROTEIN MFR SERPL ELPH: 7.4 G/DL (ref 6.4–8.2)
MCH RBC QN AUTO: 30.3 PG (ref 26–34)
MCHC RBC AUTO-ENTMCNC: 33.2 G/DL (ref 31–37)
MCV RBC AUTO: 91 FL (ref 80–100)
MONOCYTES # BLD AUTO: 0.42 X10(3) UL (ref 0.1–1)
MONOCYTES NFR BLD AUTO: 5.9 %
NEUTROPHILS # BLD AUTO: 5.04 X10 (3) UL (ref 1.5–7.7)
NEUTROPHILS # BLD AUTO: 5.04 X10(3) UL (ref 1.5–7.7)
NEUTROPHILS NFR BLD AUTO: 70.2 %
NONHDLC SERPL-MCNC: 144 MG/DL (ref ?–130)
OSMOLALITY SERPL CALC.SUM OF ELEC: 281 MOSM/KG (ref 275–295)
PATIENT FASTING Y/N/NP: YES
PATIENT FASTING Y/N/NP: YES
PLATELET # BLD AUTO: 257 10(3)UL (ref 150–450)
POTASSIUM SERPL-SCNC: 3.7 MMOL/L (ref 3.5–5.1)
RBC # BLD AUTO: 4.33 X10(6)UL (ref 3.8–5.3)
RH BLOOD TYPE: POSITIVE
RUBV IGG SER QL: POSITIVE
RUBV IGG SER-ACNC: 61.2 IU/ML (ref 10–?)
SODIUM SERPL-SCNC: 137 MMOL/L (ref 136–145)
TRIGL SERPL-MCNC: 142 MG/DL (ref 30–149)
TSI SER-ACNC: 1.68 MIU/ML (ref 0.36–3.74)
VLDLC SERPL CALC-MCNC: 28 MG/DL (ref 0–30)
WBC # BLD AUTO: 7.2 X10(3) UL (ref 4–11)

## 2020-03-05 PROCEDURE — 87389 HIV-1 AG W/HIV-1&-2 AB AG IA: CPT

## 2020-03-05 PROCEDURE — 86762 RUBELLA ANTIBODY: CPT

## 2020-03-05 PROCEDURE — 86803 HEPATITIS C AB TEST: CPT

## 2020-03-05 PROCEDURE — 84443 ASSAY THYROID STIM HORMONE: CPT

## 2020-03-05 PROCEDURE — 87086 URINE CULTURE/COLONY COUNT: CPT

## 2020-03-05 PROCEDURE — 85025 COMPLETE CBC W/AUTO DIFF WBC: CPT

## 2020-03-05 PROCEDURE — 86901 BLOOD TYPING SEROLOGIC RH(D): CPT

## 2020-03-05 PROCEDURE — 36415 COLL VENOUS BLD VENIPUNCTURE: CPT

## 2020-03-05 PROCEDURE — 86850 RBC ANTIBODY SCREEN: CPT

## 2020-03-05 PROCEDURE — 82950 GLUCOSE TEST: CPT

## 2020-03-05 PROCEDURE — 80053 COMPREHEN METABOLIC PANEL: CPT

## 2020-03-05 PROCEDURE — 87340 HEPATITIS B SURFACE AG IA: CPT

## 2020-03-05 PROCEDURE — 86780 TREPONEMA PALLIDUM: CPT

## 2020-03-05 PROCEDURE — 86900 BLOOD TYPING SEROLOGIC ABO: CPT

## 2020-03-05 PROCEDURE — 80061 LIPID PANEL: CPT

## 2020-03-05 NOTE — TELEPHONE ENCOUNTER
Notes recorded by Veronica Goldmann, MD on 3/5/2020 at 11:16 AM CST  Please inform by MyChart, mail, or call of normal laboratory tests-- Glucola.  To be repeated at 28 weeks. Pt Name and  verified.   Patient informed of all lab results and verbalize

## 2020-03-05 NOTE — TELEPHONE ENCOUNTER
----- Message from Corine Brooks MD sent at 3/5/2020 10:11 AM CST -----  Please inform by MyChart, mail, or call of normal laboratory tests-- PN labs and TSH.   Glucola pending

## 2020-03-06 LAB — T PALLIDUM AB SER QL: NEGATIVE

## 2020-03-10 ENCOUNTER — INITIAL PRENATAL (OUTPATIENT)
Dept: OBGYN CLINIC | Facility: CLINIC | Age: 30
End: 2020-03-10

## 2020-03-10 VITALS — SYSTOLIC BLOOD PRESSURE: 122 MMHG | DIASTOLIC BLOOD PRESSURE: 74 MMHG | BODY MASS INDEX: 38 KG/M2 | WEIGHT: 193.81 LBS

## 2020-03-10 DIAGNOSIS — Z34.81 ENCOUNTER FOR SUPERVISION OF OTHER NORMAL PREGNANCY IN FIRST TRIMESTER: Primary | ICD-10-CM

## 2020-03-10 DIAGNOSIS — O09.299: ICD-10-CM

## 2020-03-10 PROBLEM — Z34.90 SUPERVISION OF NORMAL PREGNANCY: Status: ACTIVE | Noted: 2020-03-10

## 2020-03-10 PROBLEM — Z34.90 SUPERVISION OF NORMAL PREGNANCY (HCC): Status: ACTIVE | Noted: 2020-03-10

## 2020-03-10 PROBLEM — Z00.01 ENCOUNTER FOR ROUTINE ADULT HEALTH EXAMINATION WITH ABNORMAL FINDINGS: Status: RESOLVED | Noted: 2019-05-31 | Resolved: 2020-03-10

## 2020-03-10 PROBLEM — N92.6 MISSED MENSES: Status: RESOLVED | Noted: 2020-02-27 | Resolved: 2020-03-10

## 2020-03-10 PROBLEM — O36.4XX0 IUFD AT 20 WEEKS OR MORE OF GESTATION: Status: ACTIVE | Noted: 2020-03-10

## 2020-03-10 PROBLEM — O36.4XX0 IUFD AT 20 WEEKS OR MORE OF GESTATION (HCC): Status: ACTIVE | Noted: 2020-03-10

## 2020-03-10 NOTE — PROGRESS NOTES
No complaints. States that she will be doing the panorama test.  States that with her first pregnancy she had a stillborn at 25 weeks. She states that she was told that the baby had a cystic structure at the level of the baby's chest or hard externally.

## 2020-03-11 ENCOUNTER — TELEPHONE (OUTPATIENT)
Dept: FAMILY MEDICINE CLINIC | Facility: CLINIC | Age: 30
End: 2020-03-11

## 2020-03-11 NOTE — TELEPHONE ENCOUNTER
----- Message from La Nena Anguiano MD sent at 3/10/2020  4:39 PM CDT -----  Please let her know that her cholesterol is elevated . Healthy eating now that she is pregnant and after pregnancy we will repeat.   Her CMP is stable

## 2020-03-21 ENCOUNTER — APPOINTMENT (OUTPATIENT)
Dept: LAB | Facility: HOSPITAL | Age: 30
End: 2020-03-21
Attending: OBSTETRICS & GYNECOLOGY
Payer: COMMERCIAL

## 2020-03-21 DIAGNOSIS — Z34.81 ENCOUNTER FOR SUPERVISION OF OTHER NORMAL PREGNANCY IN FIRST TRIMESTER: ICD-10-CM

## 2020-03-21 PROCEDURE — 82105 ALPHA-FETOPROTEIN SERUM: CPT

## 2020-03-21 PROCEDURE — 36415 COLL VENOUS BLD VENIPUNCTURE: CPT

## 2020-03-25 LAB
AFP SMOKING: NO
FAMILY HX NEURAL TUBE DEFECT: NO
INSULIN REQ MATERNAL DIABETES: NO
MATERNAL AGE OF DELIVERY: 30.8 YR
MOM FOR AFP: 0.88
PATIENT'S AFP: 23 NG/ML

## 2020-04-07 ENCOUNTER — APPOINTMENT (OUTPATIENT)
Dept: LAB | Facility: HOSPITAL | Age: 30
End: 2020-04-07
Attending: OBSTETRICS & GYNECOLOGY
Payer: COMMERCIAL

## 2020-04-07 ENCOUNTER — ROUTINE PRENATAL (OUTPATIENT)
Dept: OBGYN CLINIC | Facility: CLINIC | Age: 30
End: 2020-04-07

## 2020-04-07 VITALS — BODY MASS INDEX: 38 KG/M2 | WEIGHT: 195 LBS | DIASTOLIC BLOOD PRESSURE: 72 MMHG | SYSTOLIC BLOOD PRESSURE: 114 MMHG

## 2020-04-07 DIAGNOSIS — Z34.82 ENCOUNTER FOR SUPERVISION OF OTHER NORMAL PREGNANCY IN SECOND TRIMESTER: ICD-10-CM

## 2020-04-07 DIAGNOSIS — Z34.82 ENCOUNTER FOR SUPERVISION OF OTHER NORMAL PREGNANCY IN SECOND TRIMESTER: Primary | ICD-10-CM

## 2020-04-07 PROCEDURE — 81511 FTL CGEN ABNOR FOUR ANAL: CPT

## 2020-04-07 PROCEDURE — 81002 URINALYSIS NONAUTO W/O SCOPE: CPT | Performed by: OBSTETRICS & GYNECOLOGY

## 2020-04-07 PROCEDURE — 36415 COLL VENOUS BLD VENIPUNCTURE: CPT

## 2020-04-07 NOTE — PROGRESS NOTES
No C/Os. Declines Flu vaccine. To do Level 2 U/S x 2 weeks due to High BMI. Patient states that [facility] lab will not draw lab due to Covid-19 pandemic. Patient desires Tetra screen today.

## 2020-04-27 ENCOUNTER — TELEPHONE (OUTPATIENT)
Dept: OBGYN CLINIC | Facility: CLINIC | Age: 30
End: 2020-04-27

## 2020-04-27 NOTE — TELEPHONE ENCOUNTER
Pt is scheudled with you this Friday, May 1st. Hs not had 20 wk ultrasound. Pt will be 21 wks. Okay to r/s as phone visit with you?  Pls advise

## 2020-04-29 ENCOUNTER — HOSPITAL ENCOUNTER (OUTPATIENT)
Dept: PERINATAL CARE | Facility: HOSPITAL | Age: 30
Discharge: HOME | End: 2020-04-29
Attending: OBSTETRICS & GYNECOLOGY
Payer: COMMERCIAL

## 2020-04-29 VITALS
HEART RATE: 103 BPM | BODY MASS INDEX: 38 KG/M2 | SYSTOLIC BLOOD PRESSURE: 128 MMHG | WEIGHT: 196 LBS | DIASTOLIC BLOOD PRESSURE: 75 MMHG

## 2020-04-29 DIAGNOSIS — E66.09 CLASS 2 OBESITY DUE TO EXCESS CALORIES WITHOUT SERIOUS COMORBIDITY WITH BODY MASS INDEX (BMI) OF 37.0 TO 37.9 IN ADULT: Primary | ICD-10-CM

## 2020-04-29 DIAGNOSIS — O36.4XX0 IUFD AT 20 WEEKS OR MORE OF GESTATION: ICD-10-CM

## 2020-04-29 DIAGNOSIS — E66.09 CLASS 2 OBESITY DUE TO EXCESS CALORIES WITHOUT SERIOUS COMORBIDITY WITH BODY MASS INDEX (BMI) OF 37.0 TO 37.9 IN ADULT: ICD-10-CM

## 2020-04-29 PROCEDURE — 76811 OB US DETAILED SNGL FETUS: CPT | Performed by: OBSTETRICS & GYNECOLOGY

## 2020-04-29 PROCEDURE — 99244 OFF/OP CNSLTJ NEW/EST MOD 40: CPT | Performed by: OBSTETRICS & GYNECOLOGY

## 2020-04-29 NOTE — PROGRESS NOTES
Reason for Consult:   Dear Dr. Miesha Gama,    Thank you for requesting ultrasound evaluation and maternal fetal medicine consultation on Sal Clark. As you are aware she is a 26year old female with a Hubbard pregnancy at 22w2d.   A maternal-fetal Problem Relation Age of Onset   • Diabetes Father    • No Known Problems Mother    • No Known Problems Daughter    • No Known Problems Sister    • Other (renal calculi) Brother    • No Known Problems Brother       Social History    Tobacco Use      Smoki be an incidental finding. In order to attribute a stillbirth to a cord accident there should be evidence of obstruction or circulatory compromise on umbilical cord examination. In addition, other causes of stillbirth should be excluded.    Thrombophilias, w whether obesity is a direct cause of adverse pregnancy outcome or whether the association between obesity and adverse pregnancy outcome is due to factors such as diabetes mellitus.    Data suggest that obese women should be encouraged to undertake a weight  birth in these patients, therefore, should be directed toward prevention or management of medical and obstetrical complications.                Both prepregnancy obesity and excessive maternal weight gain before or during pregnancy contribute to an four-chamber, left outflow tract, right outflow tract, arches. Genetic Sonogram:  Nuchal fold normal.  Pylelectasis absent. No hyperechogenic bowel. Echogenic intracardiac foci absent. Nasal bone present. Choroid plexus cyst absent.       Summary of

## 2020-05-01 ENCOUNTER — VIRTUAL PHONE E/M (OUTPATIENT)
Dept: OBGYN CLINIC | Facility: CLINIC | Age: 30
End: 2020-05-01

## 2020-05-01 DIAGNOSIS — Z3A.21 21 WEEKS GESTATION OF PREGNANCY: Primary | ICD-10-CM

## 2020-05-01 NOTE — PROGRESS NOTES
Virtual Telephone Check-In    Weston Otero verbally consents to a Virtual/Telephone Check-In visit on 05/01/20. Patient understands and accepts financial responsibility for any deductible, co-insurance and/or co-pays associated with this service.     D

## 2020-05-02 NOTE — PROGRESS NOTES
Called pt to inform that letter was generated and letter sent to her via [address]. LMTCB if she needs the letter to be faxed to her employer.

## 2020-05-04 ENCOUNTER — TELEPHONE (OUTPATIENT)
Dept: OBGYN CLINIC | Facility: CLINIC | Age: 30
End: 2020-05-04

## 2020-05-04 NOTE — TELEPHONE ENCOUNTER
Innatal test received    Sample Collected date: 4/23/2020  Sample Reported date: 4/25/2020      Results: Low Risk   Chromosome 21,18, 13: no aneuploidy   Fetal Sex: Female       Placed on provider desk for sign off to be scanned into chart.

## 2020-05-05 ENCOUNTER — TELEPHONE (OUTPATIENT)
Dept: OBGYN CLINIC | Facility: CLINIC | Age: 30
End: 2020-05-05

## 2020-05-05 NOTE — TELEPHONE ENCOUNTER
Please inform patient her Panorama screen gave good results with low risk for:  Trisomy 21,18, and 13. Also Monosomy X, Triploidy and a deletion syndrome.

## 2020-05-06 NOTE — TELEPHONE ENCOUNTER
Spoke to patient informed of negative/low risk genetic results, also informed patient of gender, patient verbalized understanding. NO further questions.

## 2020-05-17 ENCOUNTER — HOSPITAL ENCOUNTER (EMERGENCY)
Facility: HOSPITAL | Age: 30
Discharge: HOME | End: 2020-05-17
Attending: EMERGENCY MEDICINE
Payer: COMMERCIAL

## 2020-05-17 VITALS
RESPIRATION RATE: 22 BRPM | SYSTOLIC BLOOD PRESSURE: 114 MMHG | OXYGEN SATURATION: 98 % | DIASTOLIC BLOOD PRESSURE: 70 MMHG | HEART RATE: 98 BPM | TEMPERATURE: 99 F

## 2020-05-17 DIAGNOSIS — U07.1 COVID-19 VIRUS DETECTED: Primary | ICD-10-CM

## 2020-05-17 PROCEDURE — 99283 EMERGENCY DEPT VISIT LOW MDM: CPT

## 2020-05-17 NOTE — ED NOTES
Received pt a/ox3, clear speech, nad, no resp distress, ambulatory with steady gait  Here with c/o cough, fever and body aches since Thursday. Pt states she was tested on Thursday and was negative.    Symptoms remain and pt is also 25 weeks pregnant    Plac

## 2020-05-17 NOTE — ED PROVIDER NOTES
Patient Seen in: [facility] AND [facility] Emergency Department      History   Patient presents with:  Fever    Stated Complaint:     HPI    Patient is a 71-year-old female that is 25 weeks pregnant she complains of 3 days of fever and cough.   She complains of m External ear normal.   Left Ear: External ear normal.   Nose: Nose normal.   Mouth/Throat: Oropharynx is clear and moist.   Eyes: Conjunctivae and EOM are normal. Pupils are equal, round, and reactive to light. Neck: Neck supple.    Cardiovascular: Normal

## 2020-05-17 NOTE — ED NOTES
Language line  used for discharge instructions    Pt verbalized understanding of discharge and follow up instructions.  Denies additional questioning  Stable upon discharge

## 2020-05-17 NOTE — ED INITIAL ASSESSMENT (HPI)
Pt reports cough,  fever, and body aches since friday. Pt states that she was negative for covid on tues. Pt is 25 weeks pregnant.

## 2020-05-18 ENCOUNTER — TELEPHONE (OUTPATIENT)
Dept: OBGYN CLINIC | Facility: CLINIC | Age: 30
End: 2020-05-18

## 2020-05-18 ENCOUNTER — PATIENT OUTREACH (OUTPATIENT)
Dept: CASE MANAGEMENT | Age: 30
End: 2020-05-18

## 2020-05-18 NOTE — TELEPHONE ENCOUNTER
Pt Name and  verified. Pt informed that visit will be a video visit. Instructions were provided via phone call and pt voiced understanding. Will call if she encounters any issue prior to apt.

## 2020-05-18 NOTE — TELEPHONE ENCOUNTER
Please contact patient and change her prenatal visit on 5/27 to a video visit or phone visit with that provider due to her being COVID-19 positive. I spoke with patient on the phone and reviewed her COVID positive results.   I discussed the importance of s

## 2020-05-18 NOTE — TELEPHONE ENCOUNTER
24w0d states tested positive for COVID yesterday after exposure to Any at work. States had testing last week with negative results but started feeling sick with cold like symptoms, body ache and fever since Saturday.  Went for visit yesterday and was conf

## 2020-05-18 NOTE — PROGRESS NOTES
Language Line: [id number]    Home Monitoring Condition Update    Covid19+ test date: 5/17/2020      Consent Verification:  Assessment Completed With: Patient  HIPAA Verified?   Yes    COVID-19 HOME MONITORING 5/18/2020   Temperature 98.4   Reading From Forehead from the general public 3 days after symptoms resolve or 10 days total (whichever is longer). Advised patient If symptoms worsen/ medical emergency to call 911.       Time spent this encounter reviewing chart, speaking with patient, gathering resources  To

## 2020-05-19 ENCOUNTER — TELEPHONE (OUTPATIENT)
Dept: FAMILY MEDICINE CLINIC | Facility: CLINIC | Age: 30
End: 2020-05-19

## 2020-05-19 ENCOUNTER — PATIENT OUTREACH (OUTPATIENT)
Dept: CASE MANAGEMENT | Age: 30
End: 2020-05-19

## 2020-05-19 NOTE — TELEPHONE ENCOUNTER
Called patient to follow-up on symptoms. Acute call initiated: N/A  ER visit: 05/17/2020  Telephone/Virtual Visit: N/A  Follow-up Call: Today    Faroese-speaking patient.   Informed her calling from Dr. Cardozo Pac office however, no other clinical staff or

## 2020-05-19 NOTE — PROGRESS NOTES
Home Monitoring Condition Update    Covid19+ test date: 5/17/2020      Consent Verification:  Assessment Completed With: Patient  HIPAA Verified?   Yes    COVID-19 HOME MONITORING 5/19/2020   Temperature 98.2   Reading From Forehead   Pulse 76   Pulse ramakrishna household members when possible and stay completely isolated from the general public 3 days after symptoms resolve or 10 days total (whichever is longer). Advised patient If symptoms worsen/ medical emergency to call 911.       Time spent this encounter re

## 2020-05-20 ENCOUNTER — PATIENT OUTREACH (OUTPATIENT)
Dept: CASE MANAGEMENT | Age: 30
End: 2020-05-20

## 2020-05-20 ENCOUNTER — TELEPHONE (OUTPATIENT)
Dept: FAMILY MEDICINE CLINIC | Facility: CLINIC | Age: 30
End: 2020-05-20

## 2020-05-20 NOTE — PROGRESS NOTES
Home Monitoring Condition Update    Covid19+ test date:       Consent Verification:  Assessment Completed With: Patient  HIPAA Verified?   Yes    COVID-19 HOME MONITORING 5/20/2020   Temperature 98.4   Reading From Forehead   Pulse 84   Pulse taken from Roby Simmonds symptoms resolve or 10 days total (whichever is longer). Advised patient If symptoms worsen/ medical emergency to call 911.       Time spent this encounter reviewing chart, speaking with patient, gathering resources  Total Time: 15  minutes

## 2020-05-20 NOTE — TELEPHONE ENCOUNTER
Contacted pt for day 3 of COVID home monitoring program. Pt states she has chest and upper back pain of 6/10 but denies fever. Per protocol, pt would have phone appt scheduled with PCP for 5/22, however Dr Isi Larsen schedule is booked for next two days. Could pt be squeezed in or should she have phone appt with another provider?     Please advise      Please reply to [phone] [facility]

## 2020-05-21 ENCOUNTER — VIRTUAL PHONE E/M (OUTPATIENT)
Dept: FAMILY MEDICINE CLINIC | Facility: CLINIC | Age: 30
End: 2020-05-21

## 2020-05-21 DIAGNOSIS — Z3A.24 24 WEEKS GESTATION OF PREGNANCY: ICD-10-CM

## 2020-05-21 DIAGNOSIS — B34.2 CORONAVIRUS INFECTION: Primary | ICD-10-CM

## 2020-05-21 PROCEDURE — 99213 OFFICE O/P EST LOW 20 MIN: CPT | Performed by: FAMILY MEDICINE

## 2020-05-21 NOTE — PROGRESS NOTES
Virtual Telephone Check-In    Baljit Stubbs verbally consents to a Virtual/Telephone Check-In visit on 05/21/20        Patient understands and accepts financial responsibility for any deductible, co-insurance and/or co-pays associated with this service. likely secondary to pregnancy. Advised tylenol and supportive care. -ER/red flags discussed      Advised to follow CDC guidelines for self isolation and symptomatic treatment as outlined on CDC Patient Guidelines.  Understands phone evaluation is not a sub

## 2020-05-22 ENCOUNTER — HOSPITAL ENCOUNTER (OUTPATIENT)
Facility: HOSPITAL | Age: 30
Setting detail: OBSERVATION
Discharge: HOME | End: 2020-05-24
Attending: EMERGENCY MEDICINE | Admitting: HOSPITALIST
Payer: COMMERCIAL

## 2020-05-22 ENCOUNTER — APPOINTMENT (OUTPATIENT)
Dept: CT IMAGING | Facility: HOSPITAL | Age: 30
End: 2020-05-22
Attending: EMERGENCY MEDICINE
Payer: COMMERCIAL

## 2020-05-22 ENCOUNTER — TELEPHONE (OUTPATIENT)
Dept: FAMILY MEDICINE CLINIC | Facility: CLINIC | Age: 30
End: 2020-05-22

## 2020-05-22 DIAGNOSIS — U07.1 PNEUMONIA DUE TO COVID-19 VIRUS: Primary | ICD-10-CM

## 2020-05-22 DIAGNOSIS — Z3A.25 25 WEEKS GESTATION OF PREGNANCY: ICD-10-CM

## 2020-05-22 DIAGNOSIS — J12.82 PNEUMONIA DUE TO COVID-19 VIRUS: Primary | ICD-10-CM

## 2020-05-22 DIAGNOSIS — R06.82 TACHYPNEA: ICD-10-CM

## 2020-05-22 PROBLEM — D64.9 ANEMIA: Status: ACTIVE | Noted: 2020-05-22

## 2020-05-22 PROBLEM — R73.9 HYPERGLYCEMIA: Status: ACTIVE | Noted: 2020-05-22

## 2020-05-22 PROBLEM — E87.6 HYPOKALEMIA: Status: ACTIVE | Noted: 2020-05-22

## 2020-05-22 PROCEDURE — 99214 OFFICE O/P EST MOD 30 MIN: CPT | Performed by: INTERNAL MEDICINE

## 2020-05-22 PROCEDURE — 71260 CT THORAX DX C+: CPT | Performed by: EMERGENCY MEDICINE

## 2020-05-22 PROCEDURE — 99220 INITIAL OBSERVATION CARE,LEVL III: CPT | Performed by: HOSPITALIST

## 2020-05-22 RX ORDER — ONDANSETRON 2 MG/ML
4 INJECTION INTRAMUSCULAR; INTRAVENOUS EVERY 6 HOURS PRN
Status: DISCONTINUED | OUTPATIENT
Start: 2020-05-22 | End: 2020-05-24

## 2020-05-22 RX ORDER — ENOXAPARIN SODIUM 100 MG/ML
40 INJECTION SUBCUTANEOUS DAILY
Status: DISCONTINUED | OUTPATIENT
Start: 2020-05-23 | End: 2020-05-22

## 2020-05-22 RX ORDER — SODIUM CHLORIDE 0.9 % (FLUSH) 0.9 %
3 SYRINGE (ML) INJECTION AS NEEDED
Status: DISCONTINUED | OUTPATIENT
Start: 2020-05-22 | End: 2020-05-24

## 2020-05-22 RX ORDER — ENOXAPARIN SODIUM 100 MG/ML
40 INJECTION SUBCUTANEOUS NIGHTLY
Status: DISCONTINUED | OUTPATIENT
Start: 2020-05-22 | End: 2020-05-24

## 2020-05-22 RX ORDER — ACETAMINOPHEN 325 MG/1
650 TABLET ORAL EVERY 6 HOURS PRN
Status: DISCONTINUED | OUTPATIENT
Start: 2020-05-22 | End: 2020-05-24

## 2020-05-22 RX ORDER — ALBUTEROL SULFATE 90 UG/1
1 AEROSOL, METERED RESPIRATORY (INHALATION) EVERY 4 HOURS PRN
Status: DISCONTINUED | OUTPATIENT
Start: 2020-05-22 | End: 2020-05-24

## 2020-05-22 RX ORDER — GUAIFENESIN 100 MG/5ML
200 SOLUTION ORAL EVERY 4 HOURS PRN
Status: DISCONTINUED | OUTPATIENT
Start: 2020-05-22 | End: 2020-05-24

## 2020-05-22 RX ORDER — POTASSIUM CHLORIDE 1.5 G/1.77G
40 POWDER, FOR SOLUTION ORAL EVERY 4 HOURS
Status: COMPLETED | OUTPATIENT
Start: 2020-05-22 | End: 2020-05-23

## 2020-05-22 NOTE — H&P
[facility]    PATIENT'S NAME: Drea Beatty   ATTENDING PHYSICIAN: Shelton Allen MD   PATIENT ACCOUNT#:   [id number]    LOCATION:  [place] [address]  MEDICAL RECORD #:   [id number]       YOB: 1990  ADMISSION DATE:       05/22/20 round, reactive. NECK:  Supple. No lymphadenopathy. Trachea midline. Full range of motion. LUNGS:  Rhonchi auscultated on both lung fields. Tachypneic. Diminished breathing sounds. Otherwise no wheezing. HEART:  Regular rate and rhythm.   S1, S2 Isabel Overlie

## 2020-05-22 NOTE — ED NOTES
Orders for admission, patient is aware of plan and ready to go upstairs, any questions, please call ED RN Jovani Huggins at ext: [phone]

## 2020-05-22 NOTE — TELEPHONE ENCOUNTER
Pt seen for virtual visit on 5/21 - please advise if pt can be discharged from Home Monitoring program.    Thank you.       Please reply to [phone] [facility]

## 2020-05-22 NOTE — CONSULTS
[facility]    Report of Consultation    Nigel Lanza Patient Status:  Emergency    12/3/1990 MRN [id number]   Location [address] Attending Aicha Wilde MD   Hosp Day # 0 PCP Yasmin Boss MD Constitutional: Fatigue, malaise  HEENT: denies headache, sore throat, vision loss  Cardio: denies chest pain, chest pressure, palpitations  Respiratory: dyspnea, cough, denies wheezing, hemoptysis   GI: denies nausea, vomiting, abdominal pain  : denie concern for a pulmonary embolism persist, consider short-term repeat CT pulmonary angiogram in 24 hours or further evaluation with a nuclear medicine perfusion scan. 2. Findings of viral pneumonia. 3. Small hiatal hernia.  4. Possible nonobstructing left r

## 2020-05-23 PROCEDURE — 99214 OFFICE O/P EST MOD 30 MIN: CPT | Performed by: INTERNAL MEDICINE

## 2020-05-23 PROCEDURE — 99243 OFF/OP CNSLTJ NEW/EST LOW 30: CPT | Performed by: OBSTETRICS & GYNECOLOGY

## 2020-05-23 PROCEDURE — 99226 SUBSEQUENT OBSERVATION CARE: CPT | Performed by: HOSPITALIST

## 2020-05-23 NOTE — PROGRESS NOTES
RN at bedside for fetal heart tones with doppler. 's. Patient denies any cramping, pain, bleeding, confirms active fetal movement.

## 2020-05-23 NOTE — PROGRESS NOTES
[facility]     Progress Note        Weston Cormierter Patient Status:  Observation    12/3/1990 MRN [id number]   Location [facility] 5SW/SE Attending Rajan Larios MD   Hosp Day # 0 PCP Citlaly Thurston MD       Subjective: 84 05/23/2020    CA 9.1 05/23/2020    ALB 2.3 05/22/2020    ALKPHO 172 05/22/2020    BILT 0.2 05/22/2020    TP 6.8 05/22/2020    AST 30 05/22/2020    ALT 53 05/22/2020    DDIMER 0.61 05/23/2020    CRP 14.70 05/23/2020    TROP <0.045 05/22/2020    CK 21 05/ [address] [place]

## 2020-05-23 NOTE — PROGRESS NOTES
[place] [facility] - [facility]    Progress Note    Sal Clark Patient Status:  Observation    12/3/1990 MRN [id number]   Location [facility] 5SW/SE Attending Kirk Farah MD   Hosp Day # 0 PCP Joshua Pimentel MD       Subjective:   Natty Kenny 05/23/2020    CO2 27.0 05/23/2020    GLU 84 05/23/2020    CA 9.1 05/23/2020    ALB 2.3 (L) 05/22/2020    ALKPHO 172 (H) 05/22/2020    BILT 0.2 05/22/2020    TP 6.8 05/22/2020    AST 30 05/22/2020    ALT 53 05/22/2020    TSH 1.680 03/05/2020     08/1 COVID-19, p/w progressive mild dyspnea and dry cough. #Pneumonia due to COVID-19 virus  - Pulm on consult  - Symptoms onset 5/14, SARS-CoV-2 positive 5/17, admitted for worsening symptoms 5/22.  - CTA chest 5/22 shows no PE, but bilateral patchy GGO.   -

## 2020-05-23 NOTE — CONSULTS
[facility]    PATIENT'S NAME: Drea Hayward   ATTENDING PHYSICIAN: Sara Peña MD   CONSULTING PHYSICIAN: Lyla Love.  Rema Perea MD   PATIENT ACCOUNT#:   [id number]    LOCATION:  [address] [place] [address] Loop #:   [id number]       DATE OF BIRTH: currently deferred due to this being a phone consultation. ASSESSMENT:    1. Intrauterine pregnancy at 24-4/7 weeks. 2.   Pneumonia due to COVID-19 virus. PLAN:  The patient has been admitted for observation and supportive therapy.   OB recommendat

## 2020-05-23 NOTE — PLAN OF CARE
Problem: Patient Centered Care  Goal: Patient preferences are identified and integrated in the patient's plan of care  Description  Interventions:  - What would you like us to know as we care for you?  \"I speak mostly Pashto\"  - Provide timely, complet HEMATOLOGIC - ADULT  Goal: Maintains hematologic stability  Description  INTERVENTIONS  - Assess for signs and symptoms of bleeding or hemorrhage  - Monitor labs and vital signs for trends  - Administer supportive blood products/factors, fluids and medicat

## 2020-05-23 NOTE — PROGRESS NOTES
[place] [facility]    OB/GYNE Progress Note      Kadie Red Patient Status:  Observation    12/3/1990 MRN [id number]   Location [facility] 5SW/SE Attending Bharat Kelly MD   Hosp Day # 0 PCP Haleigh Polk MD        Assessm CO2 27.0 05/23/2020    GLU 84 05/23/2020    CA 9.1 05/23/2020    ALB 2.3 (L) 05/22/2020    ALKPHO 172 (H) 05/22/2020    BILT 0.2 05/22/2020    TP 6.8 05/22/2020    AST 30 05/22/2020    ALT 53 05/22/2020    TSH 1.680 03/05/2020     08/18/2019       L

## 2020-05-23 NOTE — PLAN OF CARE
Problem: Patient Centered Care  Goal: Patient preferences are identified and integrated in the patient's plan of care  Description  Interventions:  - What would you like us to know as we care for you?  \"I speak mostly Lithuanian\"  - Provide timely, complet possible complications or alterations in birth plan  - Explain treatment plan  - Explain testing/procedures prior to initiation  - Encourage participation in care  - Encourage verbalization of concerns/fears  - Identify coping mechanisms  - Administer/offe

## 2020-05-24 VITALS
SYSTOLIC BLOOD PRESSURE: 105 MMHG | DIASTOLIC BLOOD PRESSURE: 63 MMHG | OXYGEN SATURATION: 91 % | HEART RATE: 75 BPM | RESPIRATION RATE: 20 BRPM | WEIGHT: 194.63 LBS | HEIGHT: 60 IN | BODY MASS INDEX: 38.21 KG/M2 | TEMPERATURE: 98 F

## 2020-05-24 PROBLEM — E87.6 HYPOKALEMIA: Status: RESOLVED | Noted: 2020-05-22 | Resolved: 2020-05-24

## 2020-05-24 PROBLEM — R06.82 TACHYPNEA: Status: RESOLVED | Noted: 2020-05-22 | Resolved: 2020-05-24

## 2020-05-24 PROBLEM — R73.9 HYPERGLYCEMIA: Status: RESOLVED | Noted: 2020-05-22 | Resolved: 2020-05-24

## 2020-05-24 PROCEDURE — 99224 SUBSEQUENT OBSERVATION CARE: CPT | Performed by: OBSTETRICS & GYNECOLOGY

## 2020-05-24 PROCEDURE — 99217 OBSERVATION CARE DISCHARGE: CPT | Performed by: HOSPITALIST

## 2020-05-24 PROCEDURE — 99214 OFFICE O/P EST MOD 30 MIN: CPT | Performed by: INTERNAL MEDICINE

## 2020-05-24 NOTE — PROGRESS NOTES
[facility]    Progress Note      Assessment and Plan:   1. Novel coronavirus pneumonitis–patient is much better clinically. Off oxygen.     Recommendations: Okay to discharge home, follow-up primary care and contact us promptly if new prob

## 2020-05-24 NOTE — DISCHARGE SUMMARY
[facility]    Discharge Summary    Kevin Jacob Patient Status:  Observation    12/3/1990 MRN [id number]   Location [facility] 5SW/SE Attending Guy Clinton MD   Hosp Day # 0 PCP Jamila Lin MD     Date of Admissio advised to stay home for 2 weeks before going back to work. A work note provided. No additional medication at discharge.     #25 weeks gestation of pregnancy  - OB on consult: rec Lovenox 40mg daily  - FHT'S 155 bpm per Doppler. States + FM.  Denies crampin LABS :     Lab Results   Component Value Date    WBC 5.9 05/24/2020    HGB 10.4 (L) 05/24/2020    HCT 32.3 (L) 05/24/2020    .0 05/24/2020    CREATSERUM 0.42 (L) 05/24/2020    BUN 7 05/24/2020     05/24/2020    K 3.5 05/24/2020     Sheree Robles MD Consulting Physician  PULMONARY DISEASES    Kasandra Romero DO Consulting Physician  PULMONARY DISEASES     Juan M Olivares MD Consulting Physician  Samson Bowen MD Consulting Physician  OBSTETRICS & [facility]

## 2020-05-24 NOTE — DISCHARGE PLANNING
Discharge instructions given in Greenlandic, saline lock removed and remote tele. Patient understood discharge instructions.

## 2020-05-24 NOTE — PLAN OF CARE
Problem: Patient Centered Care  Goal: Patient preferences are identified and integrated in the patient's plan of care  Description  Interventions:  - What would you like us to know as we care for you?  \"I speak mostly Danish\"  - Provide timely, complet HEMATOLOGIC - ADULT  Goal: Maintains hematologic stability  Description  INTERVENTIONS  - Assess for signs and symptoms of bleeding or hemorrhage  - Monitor labs and vital signs for trends  - Administer supportive blood products/factors, fluids and medicat

## 2020-05-24 NOTE — PLAN OF CARE
Problem: Patient Centered Care  Goal: Patient preferences are identified and integrated in the patient's plan of care  Description  Interventions:  - What would you like us to know as we care for you?  \"I speak mostly Lao\"  - Provide timely, complet possible complications or alterations in birth plan  - Explain treatment plan  - Explain testing/procedures prior to initiation  - Encourage participation in care  - Encourage verbalization of concerns/fears  - Identify coping mechanisms  - Administer/offe

## 2020-05-24 NOTE — PROGRESS NOTES
OB gyne note:  Virtual visit    Pt noted she is feeling better, and much less respiratory symptoms. Pt noted good fm, and denied any vag bleeding/ctxs. Pt counseled extensively and all questions answered. Pt to call if has any questions.  .  fhts being

## 2020-05-24 NOTE — ED PROVIDER NOTES
Patient Seen in: [facility] 5sw/se      History   Patient presents with:  Dyspnea KRISTINA SOB  Viral Syndrome    Stated Complaint: SOB, 25 weeks pregnant    HPI    Patient presents the emergency department complaining of shortness of breath.   She is 2 respiratory distress. Breath sounds: Normal breath sounds. Abdominal:      General: There is no distension. Palpations: Abdomen is soft. Tenderness: There is no tenderness. Comments: Gravid abdomen, nontender.    Musculoskeletal: Jeraline Corolla POTASSIUM - Abnormal; Notable for the following components:    Potassium 3.4 (*)     All other components within normal limits   CBC W/ DIFFERENTIAL - Abnormal; Notable for the following components:    RBC 3.58 (*)     HGB 10.8 (*)     HCT 32.4 (*)     R Cardiac Monitor: Pulse Readings from Last 1 Encounters:  05/23/20 : 79  , sinus, CT negative for PE but consistent with COVID 19 diagnosis.     Radiology findings:       Radiology exams  Viewed and reviewed by myself and findings discussed with patient

## 2020-05-24 NOTE — PROGRESS NOTES
[facility] 5SW/SE  [address] [place] [address]  [facility] 30: [phone]      2020  ? Re: Mayra Rios  : 12/3/1990    ?   To Whom It May Concern:    Mayra Rios was admitted to [facility] for COVID-19 from 2020 to

## 2020-05-26 ENCOUNTER — PATIENT OUTREACH (OUTPATIENT)
Dept: CASE MANAGEMENT | Age: 30
End: 2020-05-26

## 2020-05-26 DIAGNOSIS — Z02.9 ENCOUNTERS FOR UNSPECIFIED ADMINISTRATIVE PURPOSE: ICD-10-CM

## 2020-05-27 ENCOUNTER — VIRTUAL PHONE E/M (OUTPATIENT)
Dept: FAMILY MEDICINE CLINIC | Facility: CLINIC | Age: 30
End: 2020-05-27

## 2020-05-27 DIAGNOSIS — J18.9 PNEUMONIA DUE TO INFECTIOUS ORGANISM, UNSPECIFIED LATERALITY, UNSPECIFIED PART OF LUNG: ICD-10-CM

## 2020-05-27 DIAGNOSIS — B34.2 CORONAVIRUS INFECTION: Primary | ICD-10-CM

## 2020-05-27 PROCEDURE — 1111F DSCHRG MED/CURRENT MED MERGE: CPT | Performed by: FAMILY MEDICINE

## 2020-05-27 PROCEDURE — 99213 OFFICE O/P EST LOW 20 MIN: CPT | Performed by: FAMILY MEDICINE

## 2020-05-27 NOTE — PROGRESS NOTES
Virtual Telephone Check-In    Ramu Herndon verbally consents to a Virtual/Telephone Check-In visit on 05/27/20        Patient understands and accepts financial responsibility for any deductible, co-insurance and/or co-pays associated with this service. limitations of the use of telemedicine. Please note that the following visit was completed using two-way, real-time interactive audio and/or video communication.   This has been done in good connie to provide continuity of care in the best interest of the p

## 2020-05-27 NOTE — PROGRESS NOTES
NC placed call to Leonides Carl with the assistance of a  Anil Patten #[id number], to clarify with patient if she had her HFU with Dr. Ree Recinos today.  Patient states she did not, she states she was called and told her appointment had been switch

## 2020-05-28 ENCOUNTER — TELEPHONE (OUTPATIENT)
Dept: OBGYN CLINIC | Facility: CLINIC | Age: 30
End: 2020-05-28

## 2020-05-28 ENCOUNTER — PATIENT OUTREACH (OUTPATIENT)
Dept: CASE MANAGEMENT | Age: 30
End: 2020-05-28

## 2020-05-29 ENCOUNTER — TELEPHONE (OUTPATIENT)
Dept: FAMILY MEDICINE CLINIC | Facility: CLINIC | Age: 30
End: 2020-05-29

## 2020-05-29 NOTE — TELEPHONE ENCOUNTER
Left message to call back.  If pt calls back, please reschedule patients virtual phone visit with Dr Nell Beal

## 2020-05-29 NOTE — TELEPHONE ENCOUNTER
+COVID follow-up  Positive COVID testin2020  Admitted: -    Doing well. Feeling back to normal self. Occasionally fatigue with exertion which she attributes to being 27wks pregnant    She is asymptomatic w/o residual symptoms.   Denies any c

## 2020-05-29 NOTE — TELEPHONE ENCOUNTER
Pt seen for virtual visit 5/21 - please advise if pt can be discharged from [address] or if pt should continue to be followed.      Thank you           Please reply to [phone] [facility]

## 2020-05-29 NOTE — TELEPHONE ENCOUNTER
Merissa Dong MD  You 20 minutes ago (1:40 PM)      Ok to discharge.  We will continue monitoring 2-3x/wk until clearance    Routing comment

## 2020-06-01 ENCOUNTER — HOSPITAL ENCOUNTER (OUTPATIENT)
Facility: HOSPITAL | Age: 30
Setting detail: OBSERVATION
Discharge: HOME | End: 2020-06-01
Attending: OBSTETRICS & GYNECOLOGY | Admitting: OBSTETRICS & GYNECOLOGY
Payer: COMMERCIAL

## 2020-06-01 ENCOUNTER — VIRTUAL PHONE E/M (OUTPATIENT)
Dept: OBGYN CLINIC | Facility: CLINIC | Age: 30
End: 2020-06-01

## 2020-06-01 VITALS — DIASTOLIC BLOOD PRESSURE: 64 MMHG | HEART RATE: 94 BPM | SYSTOLIC BLOOD PRESSURE: 124 MMHG

## 2020-06-01 DIAGNOSIS — R10.9 ABDOMINAL PAIN AFFECTING PREGNANCY: Primary | ICD-10-CM

## 2020-06-01 DIAGNOSIS — Z34.82 ENCOUNTER FOR SUPERVISION OF OTHER NORMAL PREGNANCY IN SECOND TRIMESTER: ICD-10-CM

## 2020-06-01 DIAGNOSIS — O46.90 VAGINAL BLEEDING IN PREGNANCY: ICD-10-CM

## 2020-06-01 DIAGNOSIS — O26.899 ABDOMINAL PAIN AFFECTING PREGNANCY: Primary | ICD-10-CM

## 2020-06-01 PROCEDURE — 59025 FETAL NON-STRESS TEST: CPT | Performed by: OBSTETRICS & GYNECOLOGY

## 2020-06-01 NOTE — PROGRESS NOTES
Virtual Telephone Check-In    Nigel Lanza verbally consents to a Virtual/Telephone Check-In visit on 06/01/20. Patient has been referred to the [facility] website at [email or web address] to review the yearly Consent to Treat document.     Patient Brennen Serna

## 2020-06-01 NOTE — TRIAGE
[facility]      Triage Note    Adi Umanzor Patient Status:  Observation    12/3/1990 MRN [id number]   Location [address]  Attending Leslie Elliott MD   Hosp Day # 0 PCP Joy Peña MD Nonstress Test Interpretation: Appropriate for gestational age           Nonstress Test Second Interpretation: Appropriate for gestational age                      Additional Comments       Reason for visit: Pt evaluated for c

## 2020-06-03 DIAGNOSIS — Z34.82 ENCOUNTER FOR SUPERVISION OF OTHER NORMAL PREGNANCY IN SECOND TRIMESTER: Primary | ICD-10-CM

## 2020-06-18 ENCOUNTER — ROUTINE PRENATAL (OUTPATIENT)
Dept: OBGYN CLINIC | Facility: CLINIC | Age: 30
End: 2020-06-18

## 2020-06-18 VITALS
BODY MASS INDEX: 39 KG/M2 | SYSTOLIC BLOOD PRESSURE: 133 MMHG | HEART RATE: 91 BPM | DIASTOLIC BLOOD PRESSURE: 82 MMHG | WEIGHT: 201.63 LBS

## 2020-06-18 DIAGNOSIS — Z34.83 ENCOUNTER FOR SUPERVISION OF OTHER NORMAL PREGNANCY IN THIRD TRIMESTER: Primary | ICD-10-CM

## 2020-06-18 PROCEDURE — 81002 URINALYSIS NONAUTO W/O SCOPE: CPT | Performed by: OBSTETRICS & GYNECOLOGY

## 2020-06-18 PROCEDURE — 1111F DSCHRG MED/CURRENT MED MERGE: CPT | Performed by: OBSTETRICS & GYNECOLOGY

## 2020-06-18 RX ORDER — PNV NO.95/FERROUS FUM/FOLIC AC 28MG-0.8MG
1 TABLET ORAL DAILY
COMMUNITY
Start: 2020-04-01 | End: 2021-08-03

## 2020-06-18 NOTE — PROGRESS NOTES
[facility]  Obstetrics and Gynecology  Prenatal Visit  Geneva Wolf MD    ROMÁN Chao is a 26year old.o.  28w3d weeks. Here for routine prenatal visit and is without complaints.   Patient denies any regular uterine contractions, sp

## 2020-06-20 ENCOUNTER — TELEPHONE (OUTPATIENT)
Dept: OBGYN CLINIC | Facility: CLINIC | Age: 30
End: 2020-06-20

## 2020-06-20 ENCOUNTER — LAB ENCOUNTER (OUTPATIENT)
Dept: LAB | Facility: HOSPITAL | Age: 30
End: 2020-06-20
Attending: OBSTETRICS & GYNECOLOGY
Payer: COMMERCIAL

## 2020-06-20 DIAGNOSIS — Z34.82 ENCOUNTER FOR SUPERVISION OF OTHER NORMAL PREGNANCY IN SECOND TRIMESTER: ICD-10-CM

## 2020-06-20 PROCEDURE — 36415 COLL VENOUS BLD VENIPUNCTURE: CPT

## 2020-06-20 PROCEDURE — 85025 COMPLETE CBC W/AUTO DIFF WBC: CPT

## 2020-06-20 PROCEDURE — 82950 GLUCOSE TEST: CPT

## 2020-06-20 PROCEDURE — 86780 TREPONEMA PALLIDUM: CPT

## 2020-06-20 NOTE — TELEPHONE ENCOUNTER
Liberian phone line  #[id number] used to translate phone call.  LMTCB     ----- Message from Martir Sheehan MD sent at 6/20/2020 10:27 AM CDT -----  Please inform patient that her she is mildly anemic, that is her hemoglobin is lower than normal ran

## 2020-06-22 ENCOUNTER — TELEPHONE (OUTPATIENT)
Dept: OBGYN CLINIC | Facility: CLINIC | Age: 30
End: 2020-06-22

## 2020-06-22 DIAGNOSIS — R73.09 ELEVATED GLUCOSE TOLERANCE TEST: Primary | ICD-10-CM

## 2020-06-22 NOTE — TELEPHONE ENCOUNTER
Palestinian phone line  #[phone] used to translate phone call. Order placed for 3 hour glucose test. Pt informed of results and recommendations.  Pt voices understanding.       ----- Message from Ning Reddy MD sent at 6/22/2020 11:09 AM CDT ----

## 2020-06-27 ENCOUNTER — LABORATORY ENCOUNTER (OUTPATIENT)
Dept: LAB | Facility: HOSPITAL | Age: 30
End: 2020-06-27
Attending: OBSTETRICS & GYNECOLOGY
Payer: COMMERCIAL

## 2020-06-27 DIAGNOSIS — R73.09 ELEVATED GLUCOSE TOLERANCE TEST: ICD-10-CM

## 2020-06-27 PROCEDURE — 36415 COLL VENOUS BLD VENIPUNCTURE: CPT

## 2020-06-27 PROCEDURE — 82952 GTT-ADDED SAMPLES: CPT

## 2020-06-27 PROCEDURE — 82951 GLUCOSE TOLERANCE TEST (GTT): CPT

## 2020-07-06 ENCOUNTER — TELEPHONE (OUTPATIENT)
Dept: OBGYN CLINIC | Facility: CLINIC | Age: 30
End: 2020-07-06

## 2020-07-06 ENCOUNTER — ROUTINE PRENATAL (OUTPATIENT)
Dept: OBGYN CLINIC | Facility: CLINIC | Age: 30
End: 2020-07-06

## 2020-07-06 VITALS — SYSTOLIC BLOOD PRESSURE: 128 MMHG | BODY MASS INDEX: 39 KG/M2 | WEIGHT: 201 LBS | DIASTOLIC BLOOD PRESSURE: 76 MMHG

## 2020-07-06 DIAGNOSIS — Z23 NEED FOR VACCINATION: ICD-10-CM

## 2020-07-06 DIAGNOSIS — Z34.92 NORMAL PREGNANCY IN SECOND TRIMESTER: Primary | ICD-10-CM

## 2020-07-06 DIAGNOSIS — E66.01 MORBID OBESITY WITH BODY MASS INDEX (BMI) OF 40.0 OR HIGHER (HCC): ICD-10-CM

## 2020-07-06 LAB
APPEARANCE: CLEAR
MULTISTIX LOT#: NORMAL NUMERIC
PH, URINE: 7.5 (ref 4.5–8)
SPECIFIC GRAVITY: 1.02 (ref 1–1.03)
URINE-COLOR: YELLOW
UROBILINOGEN,SEMI-QN: 0.2 MG/DL (ref 0–1.9)

## 2020-07-06 PROCEDURE — 90471 IMMUNIZATION ADMIN: CPT | Performed by: ADVANCED PRACTICE MIDWIFE

## 2020-07-06 PROCEDURE — 81002 URINALYSIS NONAUTO W/O SCOPE: CPT | Performed by: ADVANCED PRACTICE MIDWIFE

## 2020-07-06 PROCEDURE — 90715 TDAP VACCINE 7 YRS/> IM: CPT | Performed by: ADVANCED PRACTICE MIDWIFE

## 2020-07-06 RX ORDER — BREAST PUMP
EACH MISCELLANEOUS
Qty: 1 EACH | Refills: 0 | Status: SHIPPED | OUTPATIENT
Start: 2020-07-06 | End: 2021-08-03

## 2020-07-06 NOTE — PROGRESS NOTES
[facility], [facility]  Obstetrics and Gynecology  Prenatal Visit  Ja Jimenez CNM, APRN    HPI   Syl Morrow is a 26year old.o. [id number] 31w0d weeks.   Had covid 2 months ago, was hospitalized for 2 days with pneumonia  Baby active, its a girl  Denies HA, GBS  Blood type B positive Rhogam not need  Reviewed [facility]  Third trimester warning signs reviewed  Travel restrictions and precautions reviewed  Discussed CBE and classes offered at [address] [place] [address] and preregistration recommended  SOL, contact info

## 2020-07-07 ENCOUNTER — TELEPHONE (OUTPATIENT)
Dept: OBGYN CLINIC | Facility: CLINIC | Age: 30
End: 2020-07-07

## 2020-07-15 NOTE — PROGRESS NOTES
Swapnil Obando    Dear Dr. Cass Brown    Thank you for requesting an ultrasound and maternal-fetal medicine consultation on your patient Kadie Red.   As you are aware she is a 26year old female [id number] with a nixon preg complications. Women should take all the same precautions being asked of the whole population to reduce their risk for infection.   Pregnant women with respiratory symptoms should call their provider for directions regarding diagnostic testing and office l Jacqueline Amor M.D. The majority of the time (>50%) was spent in review of records, consultation and coordination of care. Our discussion is summarized above. The approximate physician face-to-face time was 15 minutes.

## 2020-07-16 ENCOUNTER — HOSPITAL ENCOUNTER (OUTPATIENT)
Dept: PERINATAL CARE | Facility: HOSPITAL | Age: 30
Discharge: HOME | End: 2020-07-16
Attending: OBSTETRICS & GYNECOLOGY
Payer: COMMERCIAL

## 2020-07-16 VITALS
SYSTOLIC BLOOD PRESSURE: 139 MMHG | BODY MASS INDEX: 39.46 KG/M2 | DIASTOLIC BLOOD PRESSURE: 78 MMHG | HEART RATE: 100 BPM | HEIGHT: 60 IN | WEIGHT: 201 LBS

## 2020-07-16 DIAGNOSIS — E66.09 CLASS 2 OBESITY DUE TO EXCESS CALORIES WITHOUT SERIOUS COMORBIDITY WITH BODY MASS INDEX (BMI) OF 37.0 TO 37.9 IN ADULT: ICD-10-CM

## 2020-07-16 DIAGNOSIS — O36.4XX0 IUFD AT 20 WEEKS OR MORE OF GESTATION: ICD-10-CM

## 2020-07-16 DIAGNOSIS — O98.512 COVID-19 AFFECTING PREGNANCY IN SECOND TRIMESTER: ICD-10-CM

## 2020-07-16 DIAGNOSIS — O36.4XX0 IUFD AT 20 WEEKS OR MORE OF GESTATION: Primary | ICD-10-CM

## 2020-07-16 DIAGNOSIS — U07.1 COVID-19 AFFECTING PREGNANCY IN SECOND TRIMESTER: ICD-10-CM

## 2020-07-16 DIAGNOSIS — Z87.59 HISTORY OF STILLBIRTH: ICD-10-CM

## 2020-07-16 PROCEDURE — 76816 OB US FOLLOW-UP PER FETUS: CPT | Performed by: OBSTETRICS & GYNECOLOGY

## 2020-07-16 PROCEDURE — 76819 FETAL BIOPHYS PROFIL W/O NST: CPT | Performed by: OBSTETRICS & GYNECOLOGY

## 2020-07-16 PROCEDURE — 99213 OFFICE O/P EST LOW 20 MIN: CPT | Performed by: OBSTETRICS & GYNECOLOGY

## 2020-07-20 ENCOUNTER — TELEPHONE (OUTPATIENT)
Dept: OBGYN CLINIC | Facility: CLINIC | Age: 30
End: 2020-07-20

## 2020-07-20 NOTE — TELEPHONE ENCOUNTER
Pt requesting letter for work stating she will be off of work as of 7/13/20. 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 Pt is also bringing [facility] paperwork to next appointment.

## 2020-07-22 ENCOUNTER — ROUTINE PRENATAL (OUTPATIENT)
Dept: OBGYN CLINIC | Facility: CLINIC | Age: 30
End: 2020-07-22

## 2020-07-22 ENCOUNTER — TELEPHONE (OUTPATIENT)
Dept: OBGYN CLINIC | Facility: CLINIC | Age: 30
End: 2020-07-22

## 2020-07-22 VITALS — WEIGHT: 204 LBS | BODY MASS INDEX: 40 KG/M2 | DIASTOLIC BLOOD PRESSURE: 84 MMHG | SYSTOLIC BLOOD PRESSURE: 130 MMHG

## 2020-07-22 DIAGNOSIS — Z34.93 NORMAL PREGNANCY, THIRD TRIMESTER: Primary | ICD-10-CM

## 2020-07-22 DIAGNOSIS — O36.63X0 EXCESSIVE FETAL GROWTH AFFECTING MANAGEMENT OF PREGNANCY IN THIRD TRIMESTER, SINGLE OR UNSPECIFIED FETUS: ICD-10-CM

## 2020-07-22 LAB
APPEARANCE: CLEAR
MULTISTIX LOT#: NORMAL NUMERIC
PH, URINE: 6 (ref 4.5–8)
SPECIFIC GRAVITY: 1.01 (ref 1–1.03)
URINE-COLOR: YELLOW
UROBILINOGEN,SEMI-QN: 0.2 MG/DL (ref 0–1.9)

## 2020-07-22 PROCEDURE — 3075F SYST BP GE 130 - 139MM HG: CPT | Performed by: OBSTETRICS & GYNECOLOGY

## 2020-07-22 PROCEDURE — 3079F DIAST BP 80-89 MM HG: CPT | Performed by: OBSTETRICS & GYNECOLOGY

## 2020-07-22 PROCEDURE — 81002 URINALYSIS NONAUTO W/O SCOPE: CPT | Performed by: OBSTETRICS & GYNECOLOGY

## 2020-07-22 NOTE — TELEPHONE ENCOUNTER
Pt dropped off FMLA and STD to be filled out. DANA signed, will pay once completed. Pt would like to  at ob 308.

## 2020-07-22 NOTE — PROGRESS NOTES
Noting inguinal and lower back discomforts. Reports good fetal movements. Left work due to excessive heat conditions. Baby average for gestational age on ultrasound though patient's abdomen large from adipose.   Plan weekly NSTs at 36 weeks

## 2020-07-30 NOTE — TELEPHONE ENCOUNTER
Dr. Deepa Alcantara,     Please sign off on form:  -Highlight the patient and hit \"Chart\" button. -In Chart Review, w/in the Encounter tab - click 1 time on the Telephone call encounter for 7/22/20  Scroll down the telephone encounter.  -Click \"scan on\" blue Hyperlink under \"Media\" heading for Disab. Dr. Deepa Alcantara 7/22/20  w/in the telephone enc.  -Click on Acknowledge button at the bottom right corner and left-click onto image, signature stamp appears and drag signature to Provider signature line. Stamp will turn blue. Close window.      Thank you,    Rocio Mcgrath

## 2020-08-03 ENCOUNTER — LAB ENCOUNTER (OUTPATIENT)
Dept: LAB | Facility: HOSPITAL | Age: 30
End: 2020-08-03
Attending: OBSTETRICS & GYNECOLOGY
Payer: COMMERCIAL

## 2020-08-03 DIAGNOSIS — Z34.93 NORMAL PREGNANCY, THIRD TRIMESTER: ICD-10-CM

## 2020-08-03 LAB
BASOPHILS # BLD AUTO: 0.04 X10(3) UL (ref 0–0.2)
BASOPHILS NFR BLD AUTO: 0.4 %
DEPRECATED RDW RBC AUTO: 49.5 FL (ref 35.1–46.3)
EOSINOPHIL # BLD AUTO: 0.09 X10(3) UL (ref 0–0.7)
EOSINOPHIL NFR BLD AUTO: 0.9 %
ERYTHROCYTE [DISTWIDTH] IN BLOOD BY AUTOMATED COUNT: 14.7 % (ref 11–15)
HCT VFR BLD AUTO: 34.6 % (ref 35–48)
HGB BLD-MCNC: 11.1 G/DL (ref 12–16)
IMM GRANULOCYTES # BLD AUTO: 0.1 X10(3) UL (ref 0–1)
IMM GRANULOCYTES NFR BLD: 1.1 %
LYMPHOCYTES # BLD AUTO: 1.87 X10(3) UL (ref 1–4)
LYMPHOCYTES NFR BLD AUTO: 19.7 %
MCH RBC QN AUTO: 29.4 PG (ref 26–34)
MCHC RBC AUTO-ENTMCNC: 32.1 G/DL (ref 31–37)
MCV RBC AUTO: 91.8 FL (ref 80–100)
MONOCYTES # BLD AUTO: 0.55 X10(3) UL (ref 0.1–1)
MONOCYTES NFR BLD AUTO: 5.8 %
NEUTROPHILS # BLD AUTO: 6.83 X10 (3) UL (ref 1.5–7.7)
NEUTROPHILS # BLD AUTO: 6.83 X10(3) UL (ref 1.5–7.7)
NEUTROPHILS NFR BLD AUTO: 72.1 %
PLATELET # BLD AUTO: 265 10(3)UL (ref 150–450)
RBC # BLD AUTO: 3.77 X10(6)UL (ref 3.8–5.3)
WBC # BLD AUTO: 9.5 X10(3) UL (ref 4–11)

## 2020-08-03 PROCEDURE — 36415 COLL VENOUS BLD VENIPUNCTURE: CPT

## 2020-08-03 PROCEDURE — 87389 HIV-1 AG W/HIV-1&-2 AB AG IA: CPT

## 2020-08-03 PROCEDURE — 85025 COMPLETE CBC W/AUTO DIFF WBC: CPT

## 2020-08-03 PROCEDURE — 86780 TREPONEMA PALLIDUM: CPT

## 2020-08-03 NOTE — TELEPHONE ENCOUNTER
Dr. Jannie Barron,      Please sign off on form:  -Highlight the patient and hit \"Chart\" button. -In Chart Review, w/in the Encounter tab - click 1 time on the Telephone call encounter for 7/22/20 Scroll down the telephone encounter.  -Click \"scan on\" blue Hyperlink under \"Media\" heading for FMLA Dr. Jannie Barron 7/22/20  w/in the telephone enc.  -Click on Acknowledge button at the bottom right corner and left-click onto image, signature stamp appears and drag signature to Provider signature line. Stamp will turn blue. Close window.      Thank you,    Sony Abrams

## 2020-08-05 LAB — T PALLIDUM AB SER QL: NEGATIVE

## 2020-08-06 ENCOUNTER — ROUTINE PRENATAL (OUTPATIENT)
Dept: OBGYN CLINIC | Facility: CLINIC | Age: 30
End: 2020-08-06

## 2020-08-06 ENCOUNTER — TELEPHONE (OUTPATIENT)
Dept: OBGYN CLINIC | Facility: CLINIC | Age: 30
End: 2020-08-06

## 2020-08-06 VITALS — DIASTOLIC BLOOD PRESSURE: 80 MMHG | SYSTOLIC BLOOD PRESSURE: 122 MMHG | BODY MASS INDEX: 40 KG/M2 | WEIGHT: 205 LBS

## 2020-08-06 DIAGNOSIS — Z34.93 NORMAL PREGNANCY IN THIRD TRIMESTER: Primary | ICD-10-CM

## 2020-08-06 DIAGNOSIS — O99.213 OBESITY AFFECTING PREGNANCY IN THIRD TRIMESTER: Primary | ICD-10-CM

## 2020-08-06 LAB
APPEARANCE: CLEAR
MULTISTIX LOT#: NORMAL NUMERIC
PH, URINE: 8 (ref 4.5–8)
SPECIFIC GRAVITY: 1.02 (ref 1–1.03)
UROBILINOGEN,SEMI-QN: 0.2 MG/DL (ref 0–1.9)

## 2020-08-06 PROCEDURE — 3074F SYST BP LT 130 MM HG: CPT | Performed by: OBSTETRICS & GYNECOLOGY

## 2020-08-06 PROCEDURE — 81002 URINALYSIS NONAUTO W/O SCOPE: CPT | Performed by: OBSTETRICS & GYNECOLOGY

## 2020-08-06 PROCEDURE — 3079F DIAST BP 80-89 MM HG: CPT | Performed by: OBSTETRICS & GYNECOLOGY

## 2020-08-06 NOTE — PROGRESS NOTES
Good fm. Kick cts. nst to be scheduled with [facility] starting 36 weeks,  Routed to staff to schedule since m closed at this time.

## 2020-08-07 NOTE — TELEPHONE ENCOUNTER
Pt Name and  verified. Pt informed that she is to start weekly NST's next week with MFM. Pt provided with number as she will need to see when she is available. Will also route to MFM [employer]. No further questions.

## 2020-08-11 ENCOUNTER — HOSPITAL ENCOUNTER (OUTPATIENT)
Dept: PERINATAL CARE | Facility: HOSPITAL | Age: 30
Discharge: HOME | End: 2020-08-11
Attending: OBSTETRICS & GYNECOLOGY
Payer: COMMERCIAL

## 2020-08-11 DIAGNOSIS — O99.213 OBESITY AFFECTING PREGNANCY IN THIRD TRIMESTER: ICD-10-CM

## 2020-08-11 PROCEDURE — 59025 FETAL NON-STRESS TEST: CPT | Performed by: OBSTETRICS & GYNECOLOGY

## 2020-08-11 NOTE — NST
Nonstress Test   Patient: Amisha Kern    Gestation: 36w1d    NST: OBESITY       Variability: Moderate           Accelerations: Yes           Decelerations: None            Baseline: 130 BPM           Uterine Irritability: No           Contractions: Not

## 2020-08-11 NOTE — ADDENDUM NOTE
Encounter addended by: Miquel Almazan MD on: 8/11/2020 3:49 PM   Actions taken: Clinical Note Signed, Charge Capture section accepted

## 2020-08-14 ENCOUNTER — ROUTINE PRENATAL (OUTPATIENT)
Dept: OBGYN CLINIC | Facility: CLINIC | Age: 30
End: 2020-08-14

## 2020-08-14 VITALS — BODY MASS INDEX: 40 KG/M2 | WEIGHT: 203 LBS | SYSTOLIC BLOOD PRESSURE: 114 MMHG | DIASTOLIC BLOOD PRESSURE: 70 MMHG

## 2020-08-14 DIAGNOSIS — Z34.93 NORMAL PREGNANCY IN THIRD TRIMESTER: Primary | ICD-10-CM

## 2020-08-14 LAB
APPEARANCE: CLEAR
MULTISTIX LOT#: NORMAL NUMERIC
PH, URINE: 6.5 (ref 4.5–8)
SPECIFIC GRAVITY: 1.02 (ref 1–1.03)
URINE-COLOR: YELLOW
UROBILINOGEN,SEMI-QN: 0.2 MG/DL (ref 0–1.9)

## 2020-08-14 PROCEDURE — 81002 URINALYSIS NONAUTO W/O SCOPE: CPT | Performed by: OBSTETRICS & GYNECOLOGY

## 2020-08-14 PROCEDURE — 3078F DIAST BP <80 MM HG: CPT | Performed by: OBSTETRICS & GYNECOLOGY

## 2020-08-14 PROCEDURE — 3074F SYST BP LT 130 MM HG: CPT | Performed by: OBSTETRICS & GYNECOLOGY

## 2020-08-18 ENCOUNTER — HOSPITAL ENCOUNTER (OUTPATIENT)
Dept: PERINATAL CARE | Facility: HOSPITAL | Age: 30
Discharge: HOME | End: 2020-08-18
Attending: OBSTETRICS & GYNECOLOGY
Payer: COMMERCIAL

## 2020-08-18 DIAGNOSIS — O99.213 OBESITY AFFECTING PREGNANCY IN THIRD TRIMESTER: ICD-10-CM

## 2020-08-18 DIAGNOSIS — E66.09 CLASS 2 OBESITY DUE TO EXCESS CALORIES WITHOUT SERIOUS COMORBIDITY WITH BODY MASS INDEX (BMI) OF 37.0 TO 37.9 IN ADULT: ICD-10-CM

## 2020-08-18 PROCEDURE — 59025 FETAL NON-STRESS TEST: CPT | Performed by: OBSTETRICS & GYNECOLOGY

## 2020-08-18 NOTE — NST
Nonstress Test   Patient: Leyla Olson    Gestation: 37w1d    NST: obesity       Variability: Moderate           Accelerations: Yes           Decelerations: None            Baseline: 130 BPM           Uterine Irritability: No           Contractions: Not

## 2020-08-18 NOTE — ADDENDUM NOTE
Encounter addended by: Dayna Cornelius MD on: 8/18/2020 5:13 PM   Actions taken: Clinical Note Signed, Visit diagnoses modified, Charge Capture section accepted

## 2020-08-20 ENCOUNTER — ROUTINE PRENATAL (OUTPATIENT)
Dept: OBGYN CLINIC | Facility: CLINIC | Age: 30
End: 2020-08-20

## 2020-08-20 VITALS
HEART RATE: 94 BPM | WEIGHT: 205 LBS | SYSTOLIC BLOOD PRESSURE: 125 MMHG | BODY MASS INDEX: 40 KG/M2 | DIASTOLIC BLOOD PRESSURE: 79 MMHG

## 2020-08-20 DIAGNOSIS — Z34.83 ENCOUNTER FOR SUPERVISION OF OTHER NORMAL PREGNANCY IN THIRD TRIMESTER: ICD-10-CM

## 2020-08-20 DIAGNOSIS — O99.210 OBESITY IN PREGNANCY: Primary | ICD-10-CM

## 2020-08-20 PROBLEM — Z3A.25 25 WEEKS GESTATION OF PREGNANCY: Status: RESOLVED | Noted: 2020-05-22 | Resolved: 2020-08-20

## 2020-08-20 PROBLEM — Z3A.25 25 WEEKS GESTATION OF PREGNANCY (HCC): Status: RESOLVED | Noted: 2020-05-22 | Resolved: 2020-08-20

## 2020-08-20 LAB
MULTISTIX LOT#: 1044 NUMERIC
PH, URINE: 6.5 (ref 4.5–8)
SPECIFIC GRAVITY: 1.01 (ref 1–1.03)
URINE-COLOR: YELLOW
UROBILINOGEN,SEMI-QN: 0.2 MG/DL (ref 0–1.9)

## 2020-08-20 PROCEDURE — 3074F SYST BP LT 130 MM HG: CPT | Performed by: OBSTETRICS & GYNECOLOGY

## 2020-08-20 PROCEDURE — 81002 URINALYSIS NONAUTO W/O SCOPE: CPT | Performed by: OBSTETRICS & GYNECOLOGY

## 2020-08-20 PROCEDURE — 3078F DIAST BP <80 MM HG: CPT | Performed by: OBSTETRICS & GYNECOLOGY

## 2020-08-20 NOTE — PROGRESS NOTES
[facility]  Obstetrics and Gynecology  Prenatal Visit  Jb James MD    ROMÁN Chowdhury is a 26year old.o. [id number] 37w3d weeks. Here for routine prenatal visit and is without complaints.   Patient denies any regular uterine contractions, sp hospital/L&D when she has strong contractions that last about a minute and occur every 5-6 minutes for 60-90 minutes. If her bag of water breaks, which is usually either a gush of water or water that keeps leaking, she should go to the hospital/L&D.   If s

## 2020-08-23 ENCOUNTER — HOSPITAL ENCOUNTER (OUTPATIENT)
Facility: HOSPITAL | Age: 30
Setting detail: OBSERVATION
Discharge: HOME | End: 2020-08-23
Attending: OBSTETRICS & GYNECOLOGY | Admitting: OBSTETRICS & GYNECOLOGY
Payer: COMMERCIAL

## 2020-08-23 VITALS — HEART RATE: 89 BPM | SYSTOLIC BLOOD PRESSURE: 132 MMHG | TEMPERATURE: 99 F | DIASTOLIC BLOOD PRESSURE: 77 MMHG

## 2020-08-23 PROBLEM — Z34.90 PREGNANCY (HCC): Status: ACTIVE | Noted: 2020-08-23

## 2020-08-23 PROBLEM — Z34.90 PREGNANCY: Status: ACTIVE | Noted: 2020-08-23

## 2020-08-23 LAB
BILIRUB UR QL: NEGATIVE
CLARITY UR: CLEAR
COLOR UR: YELLOW
GLUCOSE UR-MCNC: NEGATIVE MG/DL
HYALINE CASTS #/AREA URNS AUTO: 2 /LPF
KETONES UR-MCNC: NEGATIVE MG/DL
NITRITE UR QL STRIP.AUTO: NEGATIVE
PH UR: 7 [PH] (ref 5–8)
PROT UR-MCNC: 30 MG/DL
RBC #/AREA URNS AUTO: <1 /HPF
SP GR UR STRIP: 1 (ref 1–1.03)
UROBILINOGEN UR STRIP-ACNC: <2
WBC #/AREA URNS AUTO: 2 /HPF

## 2020-08-23 PROCEDURE — 59025 FETAL NON-STRESS TEST: CPT | Performed by: OBSTETRICS & GYNECOLOGY

## 2020-08-23 NOTE — PROGRESS NOTES
Pt is a 26year old female admitted to TR3/TR3-A. Patient presents with:  R/o Labor: contractions that began at 12pm this afternoon     Pt is [id number] 37w6d intra-uterine pregnancy. History obtained, consents signed.  Oriented to room, staff, and plan of

## 2020-08-25 ENCOUNTER — LAB ENCOUNTER (OUTPATIENT)
Dept: LAB | Facility: HOSPITAL | Age: 30
End: 2020-08-25
Attending: OBSTETRICS & GYNECOLOGY
Payer: COMMERCIAL

## 2020-08-25 ENCOUNTER — APPOINTMENT (OUTPATIENT)
Dept: OBGYN CLINIC | Facility: HOSPITAL | Age: 30
End: 2020-08-25
Attending: OBSTETRICS & GYNECOLOGY
Payer: COMMERCIAL

## 2020-08-25 ENCOUNTER — TELEPHONE (OUTPATIENT)
Dept: OBGYN CLINIC | Facility: CLINIC | Age: 30
End: 2020-08-25

## 2020-08-25 ENCOUNTER — HOSPITAL ENCOUNTER (OUTPATIENT)
Facility: HOSPITAL | Age: 30
Discharge: HOME | End: 2020-08-25
Attending: OBSTETRICS & GYNECOLOGY | Admitting: OBSTETRICS & GYNECOLOGY
Payer: COMMERCIAL

## 2020-08-25 ENCOUNTER — PATIENT MESSAGE (OUTPATIENT)
Dept: OBGYN CLINIC | Facility: CLINIC | Age: 30
End: 2020-08-25

## 2020-08-25 DIAGNOSIS — O99.213 OBESITY AFFECTING PREGNANCY IN THIRD TRIMESTER: ICD-10-CM

## 2020-08-25 DIAGNOSIS — N30.00 ACUTE CYSTITIS WITHOUT HEMATURIA: ICD-10-CM

## 2020-08-25 DIAGNOSIS — N30.00 ACUTE CYSTITIS WITHOUT HEMATURIA: Primary | ICD-10-CM

## 2020-08-25 PROCEDURE — 87086 URINE CULTURE/COLONY COUNT: CPT

## 2020-08-25 PROCEDURE — 59025 FETAL NON-STRESS TEST: CPT | Performed by: OBSTETRICS & GYNECOLOGY

## 2020-08-25 RX ORDER — AMOXICILLIN 250 MG/1
500 CAPSULE ORAL 3 TIMES DAILY
Qty: 42 CAPSULE | Refills: 0 | Status: SHIPPED | OUTPATIENT
Start: 2020-08-25 | End: 2020-09-01

## 2020-08-25 NOTE — PROGRESS NOTES
Pt is a 26year old female admitted to TR4/TR4-A. No chief complaint on file. Pt is [id number] 38w1d intra-uterine pregnancy. History obtained, consents signed. Oriented to room, staff, and plan of care.

## 2020-08-25 NOTE — TELEPHONE ENCOUNTER
Order for Amoxicillin placed. Mojixt message sent to pt. Gurwinder Alexander MD 28 minutes ago (12:20 PM)         Pt is 38 week pregnant. Please send rx for amoxicillin 500 mg , disp 21,  Sig one tab po tid for 7 days. Pt to get urine culture.

## 2020-08-25 NOTE — TELEPHONE ENCOUNTER
Regarding: Test Results Question  Contact: [phone]  ----- Message from Roger Varela MD sent at 8/25/2020 12:20 PM CDT -----       ----- Message from Stacy Murray to Jamaal Ramires MD sent at 8/25/2020 11:47 AM -----   Hello!   I am 38 week

## 2020-08-25 NOTE — TELEPHONE ENCOUNTER
Pt is 38 week pregnant. Please send rx for amoxicillin 500 mg , disp 21,  Sig one tab po tid for 7 days. Pt to get urine culture. To take cranberry juice . To notify us if has any persistent sx in 2 days. To go to er if has any worsening sx.

## 2020-08-25 NOTE — TELEPHONE ENCOUNTER
Regarding: Test Results Question  Contact: [phone]  ----- Message from Amol Chaparro RN sent at 8/25/2020 12:10 PM CDT -----       ----- Message from Daniel Gabriel to Cornel Ernst MD sent at 8/25/2020 11:47 AM -----   Hello!   I am 38 weeks pregna

## 2020-08-25 NOTE — NST
Nonstress Test   Patient: Nigel Lanza    Gestation: 38w1d    NST:       Variability: Moderate           Accelerations: Yes           Decelerations: None            Baseline: 145 BPM           Uterine Irritability: No           Contractions: Not present

## 2020-08-25 NOTE — TELEPHONE ENCOUNTER
From: Rusty Vargas  To: Karen Ayala MD  Sent: 8/25/2020 11:47 AM CDT  Subject: Test Results Question    Hello! I am 38 weeks pregnant. I have an infection in the urine, I have pain and burning when urinating and I will urinate very frequently.

## 2020-08-27 ENCOUNTER — ROUTINE PRENATAL (OUTPATIENT)
Dept: OBGYN CLINIC | Facility: CLINIC | Age: 30
End: 2020-08-27

## 2020-08-27 VITALS — WEIGHT: 204.38 LBS | DIASTOLIC BLOOD PRESSURE: 78 MMHG | SYSTOLIC BLOOD PRESSURE: 139 MMHG | BODY MASS INDEX: 40 KG/M2

## 2020-08-27 DIAGNOSIS — Z34.83 ENCOUNTER FOR SUPERVISION OF OTHER NORMAL PREGNANCY IN THIRD TRIMESTER: Primary | ICD-10-CM

## 2020-08-27 PROCEDURE — 1111F DSCHRG MED/CURRENT MED MERGE: CPT | Performed by: OBSTETRICS & GYNECOLOGY

## 2020-08-27 PROCEDURE — 3078F DIAST BP <80 MM HG: CPT | Performed by: OBSTETRICS & GYNECOLOGY

## 2020-08-27 PROCEDURE — 3075F SYST BP GE 130 - 139MM HG: CPT | Performed by: OBSTETRICS & GYNECOLOGY

## 2020-09-01 ENCOUNTER — HOSPITAL ENCOUNTER (OUTPATIENT)
Dept: PERINATAL CARE | Facility: HOSPITAL | Age: 30
Discharge: HOME | End: 2020-09-01
Attending: OBSTETRICS & GYNECOLOGY
Payer: COMMERCIAL

## 2020-09-01 DIAGNOSIS — O99.213 OBESITY AFFECTING PREGNANCY IN THIRD TRIMESTER: ICD-10-CM

## 2020-09-01 PROCEDURE — 59025 FETAL NON-STRESS TEST: CPT | Performed by: OBSTETRICS & GYNECOLOGY

## 2020-09-01 NOTE — NST
Nonstress Test   Patient: Tessa Perez    Gestation: 39w1d    NST: obesity hx iufd       Variability: Moderate           Accelerations: Yes           Decelerations: None            Baseline: 140 BPM           Uterine Irritability: No           Contractio

## 2020-09-02 NOTE — ADDENDUM NOTE
Encounter addended by: Hansel Allen MD on: 9/2/2020 1:01 PM   Actions taken: Clinical Note Signed, Charge Capture section accepted

## 2020-09-03 ENCOUNTER — ROUTINE PRENATAL (OUTPATIENT)
Dept: OBGYN CLINIC | Facility: CLINIC | Age: 30
End: 2020-09-03

## 2020-09-03 VITALS — DIASTOLIC BLOOD PRESSURE: 82 MMHG | WEIGHT: 206 LBS | BODY MASS INDEX: 40 KG/M2 | SYSTOLIC BLOOD PRESSURE: 118 MMHG

## 2020-09-03 DIAGNOSIS — Z34.83 ENCOUNTER FOR SUPERVISION OF OTHER NORMAL PREGNANCY IN THIRD TRIMESTER: Primary | ICD-10-CM

## 2020-09-03 LAB
APPEARANCE: CLEAR
MULTISTIX LOT#: 1044 NUMERIC
PH, URINE: 5 (ref 4.5–8)
SPECIFIC GRAVITY: 1.01 (ref 1–1.03)
URINE-COLOR: YELLOW
UROBILINOGEN,SEMI-QN: 0.2 MG/DL (ref 0–1.9)

## 2020-09-03 PROCEDURE — 81002 URINALYSIS NONAUTO W/O SCOPE: CPT | Performed by: OBSTETRICS & GYNECOLOGY

## 2020-09-03 PROCEDURE — 3074F SYST BP LT 130 MM HG: CPT | Performed by: OBSTETRICS & GYNECOLOGY

## 2020-09-03 PROCEDURE — 59426 ANTEPARTUM CARE ONLY: CPT | Performed by: OBSTETRICS & GYNECOLOGY

## 2020-09-03 PROCEDURE — 3079F DIAST BP 80-89 MM HG: CPT | Performed by: OBSTETRICS & GYNECOLOGY

## 2020-09-05 ENCOUNTER — HOSPITAL ENCOUNTER (OUTPATIENT)
Facility: HOSPITAL | Age: 30
Setting detail: OBSERVATION
Discharge: HOME | End: 2020-09-05
Attending: OBSTETRICS & GYNECOLOGY | Admitting: OBSTETRICS & GYNECOLOGY
Payer: COMMERCIAL

## 2020-09-05 VITALS — DIASTOLIC BLOOD PRESSURE: 61 MMHG | HEART RATE: 101 BPM | SYSTOLIC BLOOD PRESSURE: 129 MMHG

## 2020-09-05 PROBLEM — O47.9 IRREGULAR CONTRACTIONS (HCC): Status: ACTIVE | Noted: 2020-09-05

## 2020-09-05 PROBLEM — O47.9 IRREGULAR CONTRACTIONS: Status: ACTIVE | Noted: 2020-09-05

## 2020-09-05 PROCEDURE — 59025 FETAL NON-STRESS TEST: CPT | Performed by: OBSTETRICS & GYNECOLOGY

## 2020-09-05 NOTE — TRIAGE
[facility]      Triage Note    Nya Segura Patient Status:  Observation    12/3/1990 MRN [id number]   Location [address]  Attending Rachael Frank MD   Hosp Day # 0 PCP Leonela Vang MD Acoustic Stimulator: No           Nonstress Test Interpretation: Reactive           Nonstress Test Second Interpretation: Reactive                      Additional Comments       Reason for visit: Evaluated for c/o contractions and mucous discharge.  Citlaly Montoya

## 2020-09-05 NOTE — PROGRESS NOTES
Pt is a 26year old female admitted to TR1/TR1-A. Patient presents with:  Contractions: Irregular contractions since this morning     Pt is [id number] 39w5d intra-uterine pregnancy. History obtained, consents signed.  Oriented to room, staff, and plan of ca

## 2020-09-05 NOTE — PROGRESS NOTES
Pt is a 26year old female admitted to TR1/TR1-A. Patient presents with:  Contractions: Irregular contractions since this morning     Pt is [id number] 39w5d intra-uterine pregnancy. History obtained, consents signed.  Oriented to room, staff, and plan of ca

## 2020-09-08 ENCOUNTER — ANESTHESIA EVENT (OUTPATIENT)
Dept: OBGYN UNIT | Facility: HOSPITAL | Age: 30
End: 2020-09-08
Payer: COMMERCIAL

## 2020-09-08 ENCOUNTER — HOSPITAL ENCOUNTER (INPATIENT)
Facility: HOSPITAL | Age: 30
LOS: 2 days | Discharge: HOME | End: 2020-09-10
Attending: OBSTETRICS & GYNECOLOGY | Admitting: OBSTETRICS & GYNECOLOGY
Payer: COMMERCIAL

## 2020-09-08 ENCOUNTER — ANESTHESIA (OUTPATIENT)
Dept: OBGYN UNIT | Facility: HOSPITAL | Age: 30
End: 2020-09-08
Payer: COMMERCIAL

## 2020-09-08 LAB
ALT SERPL-CCNC: 19 U/L (ref 13–56)
ANTIBODY SCREEN: NEGATIVE
AST SERPL-CCNC: 11 U/L (ref 15–37)
BASOPHILS # BLD AUTO: 0.03 X10(3) UL (ref 0–0.2)
BASOPHILS NFR BLD AUTO: 0.3 %
CREAT UR-SCNC: 24.5 MG/DL
DEPRECATED RDW RBC AUTO: 48.5 FL (ref 35.1–46.3)
EOSINOPHIL # BLD AUTO: 0.12 X10(3) UL (ref 0–0.7)
EOSINOPHIL NFR BLD AUTO: 1.2 %
ERYTHROCYTE [DISTWIDTH] IN BLOOD BY AUTOMATED COUNT: 14.7 % (ref 11–15)
HCT VFR BLD AUTO: 36.4 % (ref 35–48)
HGB BLD-MCNC: 12.2 G/DL (ref 12–16)
IMM GRANULOCYTES # BLD AUTO: 0.06 X10(3) UL (ref 0–1)
IMM GRANULOCYTES NFR BLD: 0.6 %
LYMPHOCYTES # BLD AUTO: 2.27 X10(3) UL (ref 1–4)
LYMPHOCYTES NFR BLD AUTO: 23.2 %
MCH RBC QN AUTO: 30 PG (ref 26–34)
MCHC RBC AUTO-ENTMCNC: 33.5 G/DL (ref 31–37)
MCV RBC AUTO: 89.7 FL (ref 80–100)
MONOCYTES # BLD AUTO: 0.5 X10(3) UL (ref 0.1–1)
MONOCYTES NFR BLD AUTO: 5.1 %
NEUTROPHILS # BLD AUTO: 6.79 X10 (3) UL (ref 1.5–7.7)
NEUTROPHILS # BLD AUTO: 6.79 X10(3) UL (ref 1.5–7.7)
NEUTROPHILS NFR BLD AUTO: 69.6 %
PLATELET # BLD AUTO: 238 10(3)UL (ref 150–450)
PROT UR-MCNC: 6.8 MG/DL
PROT/CREAT UR-RTO: 0.28
RBC # BLD AUTO: 4.06 X10(6)UL (ref 3.8–5.3)
RH BLOOD TYPE: POSITIVE
SARS-COV-2 RNA RESP QL NAA+PROBE: DETECTED
WBC # BLD AUTO: 9.8 X10(3) UL (ref 4–11)

## 2020-09-08 PROCEDURE — 59410 OBSTETRICAL CARE: CPT | Performed by: OBSTETRICS & GYNECOLOGY

## 2020-09-08 RX ORDER — ONDANSETRON 2 MG/ML
4 INJECTION INTRAMUSCULAR; INTRAVENOUS EVERY 6 HOURS PRN
Status: DISCONTINUED | OUTPATIENT
Start: 2020-09-08 | End: 2020-09-10

## 2020-09-08 RX ORDER — DOCUSATE SODIUM 100 MG/1
100 CAPSULE, LIQUID FILLED ORAL
Status: DISCONTINUED | OUTPATIENT
Start: 2020-09-08 | End: 2020-09-10

## 2020-09-08 RX ORDER — ONDANSETRON 2 MG/ML
4 INJECTION INTRAMUSCULAR; INTRAVENOUS EVERY 6 HOURS PRN
Status: DISCONTINUED | OUTPATIENT
Start: 2020-09-08 | End: 2020-09-08

## 2020-09-08 RX ORDER — DIPHENHYDRAMINE HYDROCHLORIDE 50 MG/ML
12.5 INJECTION INTRAMUSCULAR; INTRAVENOUS EVERY 4 HOURS PRN
Status: DISCONTINUED | OUTPATIENT
Start: 2020-09-08 | End: 2020-09-10

## 2020-09-08 RX ORDER — LIDOCAINE HYDROCHLORIDE 10 MG/ML
30 INJECTION, SOLUTION EPIDURAL; INFILTRATION; INTRACAUDAL; PERINEURAL ONCE
Status: DISCONTINUED | OUTPATIENT
Start: 2020-09-08 | End: 2020-09-08 | Stop reason: HOSPADM

## 2020-09-08 RX ORDER — AMMONIA INHALANTS 0.04 G/.3ML
0.3 INHALANT RESPIRATORY (INHALATION) AS NEEDED
Status: DISCONTINUED | OUTPATIENT
Start: 2020-09-08 | End: 2020-09-08

## 2020-09-08 RX ORDER — EPHEDRINE SULFATE/0.9% NACL/PF 25 MG/5 ML
5 SYRINGE (ML) INTRAVENOUS AS NEEDED
Status: DISCONTINUED | OUTPATIENT
Start: 2020-09-08 | End: 2020-09-10

## 2020-09-08 RX ORDER — DIAPER,BRIEF,INFANT-TODD,DISP
1 EACH MISCELLANEOUS EVERY 6 HOURS PRN
Status: DISCONTINUED | OUTPATIENT
Start: 2020-09-08 | End: 2020-09-10

## 2020-09-08 RX ORDER — AMMONIA INHALANTS 0.04 G/.3ML
0.3 INHALANT RESPIRATORY (INHALATION) AS NEEDED
Status: DISCONTINUED | OUTPATIENT
Start: 2020-09-08 | End: 2020-09-10

## 2020-09-08 RX ORDER — BUPIVACAINE HYDROCHLORIDE 2.5 MG/ML
20 INJECTION, SOLUTION EPIDURAL; INFILTRATION; INTRACAUDAL ONCE
Status: COMPLETED | OUTPATIENT
Start: 2020-09-08 | End: 2020-09-08

## 2020-09-08 RX ORDER — SIMETHICONE 80 MG
80 TABLET,CHEWABLE ORAL 3 TIMES DAILY PRN
Status: DISCONTINUED | OUTPATIENT
Start: 2020-09-08 | End: 2020-09-10

## 2020-09-08 RX ORDER — DEXTROSE, SODIUM CHLORIDE, SODIUM LACTATE, POTASSIUM CHLORIDE, AND CALCIUM CHLORIDE 5; .6; .31; .03; .02 G/100ML; G/100ML; G/100ML; G/100ML; G/100ML
INJECTION, SOLUTION INTRAVENOUS CONTINUOUS
Status: DISCONTINUED | OUTPATIENT
Start: 2020-09-08 | End: 2020-09-08 | Stop reason: HOSPADM

## 2020-09-08 RX ORDER — ACETAMINOPHEN 500 MG
500 TABLET ORAL EVERY 6 HOURS PRN
Status: DISCONTINUED | OUTPATIENT
Start: 2020-09-08 | End: 2020-09-08 | Stop reason: HOSPADM

## 2020-09-08 RX ORDER — SODIUM CHLORIDE, SODIUM LACTATE, POTASSIUM CHLORIDE, CALCIUM CHLORIDE 600; 310; 30; 20 MG/100ML; MG/100ML; MG/100ML; MG/100ML
INJECTION, SOLUTION INTRAVENOUS AS NEEDED
Status: DISCONTINUED | OUTPATIENT
Start: 2020-09-08 | End: 2020-09-08 | Stop reason: HOSPADM

## 2020-09-08 RX ORDER — IBUPROFEN 600 MG/1
600 TABLET ORAL EVERY 6 HOURS
Status: DISCONTINUED | OUTPATIENT
Start: 2020-09-08 | End: 2020-09-10

## 2020-09-08 RX ORDER — IBUPROFEN 600 MG/1
600 TABLET ORAL EVERY 6 HOURS PRN
Status: DISCONTINUED | OUTPATIENT
Start: 2020-09-08 | End: 2020-09-08 | Stop reason: HOSPADM

## 2020-09-08 RX ORDER — TRISODIUM CITRATE DIHYDRATE AND CITRIC ACID MONOHYDRATE 500; 334 MG/5ML; MG/5ML
30 SOLUTION ORAL AS NEEDED
Status: DISCONTINUED | OUTPATIENT
Start: 2020-09-08 | End: 2020-09-08 | Stop reason: HOSPADM

## 2020-09-08 RX ORDER — LIDOCAINE HYDROCHLORIDE AND EPINEPHRINE 15; 5 MG/ML; UG/ML
INJECTION, SOLUTION EPIDURAL AS NEEDED
Status: DISCONTINUED | OUTPATIENT
Start: 2020-09-08 | End: 2020-09-08 | Stop reason: SURG

## 2020-09-08 RX ORDER — ACETAMINOPHEN 325 MG/1
650 TABLET ORAL EVERY 6 HOURS PRN
Status: DISCONTINUED | OUTPATIENT
Start: 2020-09-08 | End: 2020-09-10

## 2020-09-08 RX ORDER — BISACODYL 10 MG
10 SUPPOSITORY, RECTAL RECTAL ONCE AS NEEDED
Status: DISCONTINUED | OUTPATIENT
Start: 2020-09-08 | End: 2020-09-10

## 2020-09-08 RX ORDER — TERBUTALINE SULFATE 1 MG/ML
0.25 INJECTION, SOLUTION SUBCUTANEOUS AS NEEDED
Status: DISCONTINUED | OUTPATIENT
Start: 2020-09-08 | End: 2020-09-08 | Stop reason: HOSPADM

## 2020-09-08 RX ADMIN — LIDOCAINE HYDROCHLORIDE AND EPINEPHRINE 3 ML: 15; 5 INJECTION, SOLUTION EPIDURAL at 11:55:00

## 2020-09-08 RX ADMIN — BUPIVACAINE HYDROCHLORIDE 5 ML: 2.5 INJECTION, SOLUTION EPIDURAL; INFILTRATION; INTRACAUDAL at 11:57:00

## 2020-09-08 NOTE — ANESTHESIA PREPROCEDURE EVALUATION
Anesthesia PreOp Note    HPI:     Luis F Voss is a 26year old female who presents for preoperative consultation requested by: * No surgeons listed *    Date of Surgery: 9/8/2020    * No procedures listed *  Indication: * No pre-op diagnosis entered * PUMP IN STYLE (Patient not taking: Reported on 8/20/2020 ), Disp: 1 each, Rfl: 0, Unknown  Albuterol Sulfate 108 (90 Base) MCG/ACT Inhalation Aerosol Powder, Breath Activated, Inhale 1-2 puffs into the lungs every 4 to 6 hours as needed (shortness of breat Shruti Silverman MD  fentaNYL citrate (SUBLIMAZE) 0.05 MG/ML injection 100 mcg, 100 mcg, Epidural, Once, Bao Sanchez MD  EPHEDrine sulfate in NaCl 0.9% (PF) 25 mg/5 ml injection 5 mg, 5 mg, Intravenous, PRN, Bao Sanchez MD  diphenhydrAMINE HCl (BENADRYL) IV of clubs or organizations: Not on file        Relationship status: Not on file      Intimate partner violence:        Fear of current or ex partner: Not on file        Emotionally abused: Not on file        Physically abused: Not on file        Forced sexu desires the anesthetic management as planned.   Jignesh Jones  9/8/2020 11:39 AM

## 2020-09-08 NOTE — ANESTHESIA POSTPROCEDURE EVALUATION
Patient: Adi Umanzor    Procedure Summary     Date:  09/08/20 Room / Location:      Anesthesia Start:  3372 Anesthesia Stop:  1728    Procedure:  LABOR ANALGESIA Diagnosis:      Scheduled Providers:   Anesthesiologist:  Theo Gregg MD    Anesthesia T

## 2020-09-08 NOTE — PROGRESS NOTES
Patient up to bathroom with assist x 2. Voided 350mls of bloody urine. Patient transferred to mother/baby room 363 per wheelchair in stable condition with baby and personal belongings. Accompanied by significant other and staff.   Report given to Allen

## 2020-09-08 NOTE — L&D DELIVERY NOTE
[facility] HOSP - [facility]    Vaginal Delivery Note    Adi Umanzor Patient Status:  Inpatient    12/3/1990 MRN [id number]   Location [address]  Attending Viktor Singleton MD   Hosp Day # 0 PCP Joy Peña MD
Atherosclerosis of coronary artery  Coronary artery disease  ETOH abuse    Gastritis    Myocardial infarction involving other coronary artery    Pacemaker    Paroxysmal a-fib

## 2020-09-08 NOTE — PROGRESS NOTES
Pt is a 26year old female admitted to TR1/TR1-A. Patient presents with:  Contractions: Cx every 5-10 mins, spotting since yesterday with mucous, +FM, denies LOF     Pt is [id number] 40w1d intra-uterine pregnancy. History obtained, consents signed.  Madan Moore

## 2020-09-08 NOTE — ANESTHESIA PROCEDURE NOTES
Labor Analgesia  Performed by: Jaime Arredondo MD  Authorized by: Jaime Arredondo MD       General Information and Staff    Start Time:   Anesthesiologist: Jaime Arredondo MD  Performed by:   Anesthesiologist  Patient Location:  OB  Reason for Block: labor epi

## 2020-09-08 NOTE — PROGRESS NOTES
[facility]    OB/GYNE Progress Note      Lali Grant Patient Status:  Inpatient    12/3/1990 MRN [id number]   Location [facility] Attending Lay Calvert MD   [facility] Day # 0 PCP Bibi [place], [place] 05/24/2020     05/24/2020    CO2 28.0 05/24/2020    GLU 74 05/24/2020    CA 8.9 05/24/2020    ALB 2.3 (L) 05/22/2020    ALKPHO 172 (H) 05/22/2020    BILT 0.2 05/22/2020    TP 6.8 05/22/2020    AST 11 (L) 09/08/2020    ALT 19 09/08/2020    TSH 1.680 0

## 2020-09-08 NOTE — H&P
[facility]    History & Physical    [facility] Patient Status:  Inpatient    12/3/1990 MRN [id number]   Location [address] Attending Laura Watters MD   Hosp Day # 0 PCP Bulmaro Pereira MD Cigarettes        Quit date: 2019        Years since quittin.6      Smokeless tobacco: Never Used      Tobacco comment: Occasioal social smoker     Alcohol use: Not Currently      Comment: socially       Allergies/Medications:    Allergies:   No Kno without serious comorbidity with body mass index (BMI) of 37.0 to 37.9 in adult     Hypercholesteremia     Supervision of normal pregnancy     IUFD at 20 weeks or more of gestation     Anemia     Pneumonia due to COVID-19 virus     Abdominal pain affecting

## 2020-09-08 NOTE — PROGRESS NOTES
[facility]    OB/GYNE Progress Note      Callie Chao Patient Status:  Inpatient    12/3/1990 MRN [id number]   Location [address] Attending Mode Singleton MD   [place] Day # 0 PCP Bere Garcia 05/24/2020     05/24/2020    K 3.5 05/24/2020     05/24/2020    CO2 28.0 05/24/2020    GLU 74 05/24/2020    CA 8.9 05/24/2020    ALB 2.3 (L) 05/22/2020    ALKPHO 172 (H) 05/22/2020    BILT 0.2 05/22/2020    TP 6.8 05/22/2020    AST 11 (L) 09/08

## 2020-09-09 LAB
BASOPHILS # BLD AUTO: 0.02 X10(3) UL (ref 0–0.2)
BASOPHILS NFR BLD AUTO: 0.2 %
DEPRECATED RDW RBC AUTO: 49.7 FL (ref 35.1–46.3)
EOSINOPHIL # BLD AUTO: 0.11 X10(3) UL (ref 0–0.7)
EOSINOPHIL NFR BLD AUTO: 0.9 %
ERYTHROCYTE [DISTWIDTH] IN BLOOD BY AUTOMATED COUNT: 15 % (ref 11–15)
HCT VFR BLD AUTO: 33 % (ref 35–48)
HGB BLD-MCNC: 10.8 G/DL (ref 12–16)
IMM GRANULOCYTES # BLD AUTO: 0.08 X10(3) UL (ref 0–1)
IMM GRANULOCYTES NFR BLD: 0.6 %
LYMPHOCYTES # BLD AUTO: 2.17 X10(3) UL (ref 1–4)
LYMPHOCYTES NFR BLD AUTO: 17.6 %
MCH RBC QN AUTO: 29.7 PG (ref 26–34)
MCHC RBC AUTO-ENTMCNC: 32.7 G/DL (ref 31–37)
MCV RBC AUTO: 90.7 FL (ref 80–100)
MONOCYTES # BLD AUTO: 0.61 X10(3) UL (ref 0.1–1)
MONOCYTES NFR BLD AUTO: 5 %
NEUTROPHILS # BLD AUTO: 9.32 X10 (3) UL (ref 1.5–7.7)
NEUTROPHILS # BLD AUTO: 9.32 X10(3) UL (ref 1.5–7.7)
NEUTROPHILS NFR BLD AUTO: 75.7 %
PLATELET # BLD AUTO: 200 10(3)UL (ref 150–450)
RBC # BLD AUTO: 3.64 X10(6)UL (ref 3.8–5.3)
WBC # BLD AUTO: 12.3 X10(3) UL (ref 4–11)

## 2020-09-09 RX ORDER — IBUPROFEN 600 MG/1
600 TABLET ORAL EVERY 6 HOURS PRN
Qty: 30 TABLET | Refills: 0 | Status: SHIPPED | OUTPATIENT
Start: 2020-09-09 | End: 2021-01-18

## 2020-09-09 NOTE — DISCHARGE SUMMARY
[facility]    Discharge Summary    Lizy Rodriguez Patient Status:  Inpatient    12/3/1990 MRN [id number]   Location [facility] 3SE Attending Nehemiah Aquino MD   Hosp Day # 1 PCP Carl Dominguez MD     Date of Admissio a visit in 6 weeks. Specialty:  OBSTETRICS & GYNECOLOGY  Why:  postpartum exam  Contact information:  [address] [address]  [phone]                         Juanito Bee  9/9/2020

## 2020-09-09 NOTE — LACTATION NOTE
LACTATION NOTE - MOTHER      Evaluation Type: Inpatient    Problems identified  Problems identified: Knowledge deficit;Milk supply not WNL  Milk supply not WNL: Reduced (potential)(inconsistent breast stimulation; not pumping when ABM provided prior to [facility]

## 2020-09-09 NOTE — LACTATION NOTE
This note was copied from a baby's chart.   LACTATION NOTE - INFANT    Evaluation Type  Evaluation Type: Inpatient    Problems & Assessment  Problems Diagnosed or Identified: Latch difficulty  Infant Assessment: Hunger cues present;Oral mucous membranes mandy

## 2020-09-10 VITALS
OXYGEN SATURATION: 97 % | HEART RATE: 86 BPM | RESPIRATION RATE: 16 BRPM | SYSTOLIC BLOOD PRESSURE: 126 MMHG | DIASTOLIC BLOOD PRESSURE: 75 MMHG | TEMPERATURE: 99 F

## 2020-09-10 RX ORDER — PSEUDOEPHEDRINE HCL 30 MG
100 TABLET ORAL 2 TIMES DAILY
Qty: 30 CAPSULE | Refills: 0 | Status: SHIPPED | OUTPATIENT
Start: 2020-09-10 | End: 2021-08-03

## 2020-09-10 RX ORDER — FERROUS SULFATE 325(65) MG
325 TABLET ORAL
Qty: 30 TABLET | Refills: 0 | Status: SHIPPED | OUTPATIENT
Start: 2020-09-10 | End: 2021-08-03

## 2020-09-10 NOTE — LACTATION NOTE
LACTATION NOTE - MOTHER      Evaluation Type: Inpatient    Problems identified  Problems identified: Knowledge deficit;Milk supply not WNL  Milk supply not WNL: Reduced (potential)  Problems Identified Other: baby under phototherapy, supplementing with for

## 2020-09-10 NOTE — DISCHARGE SUMMARY
[facility]    Discharge Summary/Discharge Note    Adi Umanzor Patient Status:  Inpatient    12/3/1990 MRN [id number]   Location [facility] 3SE Attending Viktor Singleton MD   Hosp Day # 2 PCP Joy Peña MD DDIMER 0.48 05/24/2020    CRP 9.76 (H) 05/24/2020    TROP <0.045 05/22/2020    CK 21 (L) 05/22/2020    COLORUR Yellow 08/23/2020    CLARITY Clear 08/23/2020    SPECGRAVITY 1.010 09/03/2020    PROUR 30  (A) 08/23/2020    GLUUR neg 09/03/2020    Midge Muff [[id number]]  female  Infant Birthweight: Information for the patient's : Calli Urrutia, Girl [[id number]]  7 lb 11.1 oz (3.49 kg)    Apgars: 1 minute: 9                 5 minutes: 9                           10 minutes:        Intrapartum Complications:

## 2020-09-10 NOTE — LACTATION NOTE
This note was copied from a baby's chart.   LACTATION NOTE - INFANT    Evaluation Type  Evaluation Type: Inpatient    Problems & Assessment  Problems Diagnosed or Identified: Jaundice  Infant Assessment: Skin color: jaundice    Feeding Assessment  Summary C

## 2020-09-18 ENCOUNTER — TELEPHONE (OUTPATIENT)
Dept: OBGYN UNIT | Facility: HOSPITAL | Age: 30
End: 2020-09-18

## 2020-09-18 NOTE — PROGRESS NOTES
Message left to call physicians office with questions. Cradle call letter sent via [facility].

## 2020-09-25 ENCOUNTER — TELEPHONE (OUTPATIENT)
Dept: OBGYN CLINIC | Facility: CLINIC | Age: 30
End: 2020-09-25

## 2020-09-25 NOTE — TELEPHONE ENCOUNTER
Zac Fallon from the [address] call center called needing to know information for a disability claim on patient. Need to know delivery date. Hospitalized and if there was any complications.      Please contact

## 2020-09-25 NOTE — TELEPHONE ENCOUNTER
Pt delivered on 9/8/2020. Called [employer] intake rep and provided with this information for pt's disability claim. No further questions.

## 2020-10-20 ENCOUNTER — POSTPARTUM (OUTPATIENT)
Dept: OBGYN CLINIC | Facility: CLINIC | Age: 30
End: 2020-10-20

## 2020-10-20 VITALS — WEIGHT: 195 LBS | BODY MASS INDEX: 38 KG/M2 | SYSTOLIC BLOOD PRESSURE: 110 MMHG | DIASTOLIC BLOOD PRESSURE: 72 MMHG

## 2020-10-20 PROCEDURE — 3078F DIAST BP <80 MM HG: CPT | Performed by: OBSTETRICS & GYNECOLOGY

## 2020-10-20 PROCEDURE — 3074F SYST BP LT 130 MM HG: CPT | Performed by: OBSTETRICS & GYNECOLOGY

## 2020-10-20 NOTE — PROGRESS NOTES
HPI:    Patient ID: Amisha Kern is a 26year old female. Patient here for postpartum exam.  No C/Os. Baby girl is doing well. Breast feeding. Desires Mirena IUD. Procedure reviewed. All questions answered.   To premedicate 30 minutes prior to ins normal mood and affect. Her behavior is normal. Judgment and thought content normal.   Nursing note and vitals reviewed.              ASSESSMENT/PLAN:   Postpartum care and examination of lactating mother  (primary encounter diagnosis)  All questions answer

## 2021-01-07 ENCOUNTER — HOSPITAL ENCOUNTER (OUTPATIENT)
Age: 31
Discharge: HOME | End: 2021-01-07
Payer: COMMERCIAL

## 2021-01-07 ENCOUNTER — APPOINTMENT (OUTPATIENT)
Dept: GENERAL RADIOLOGY | Age: 31
End: 2021-01-07
Attending: NURSE PRACTITIONER
Payer: COMMERCIAL

## 2021-01-07 VITALS
RESPIRATION RATE: 18 BRPM | SYSTOLIC BLOOD PRESSURE: 136 MMHG | OXYGEN SATURATION: 99 % | HEART RATE: 79 BPM | TEMPERATURE: 97 F | DIASTOLIC BLOOD PRESSURE: 76 MMHG

## 2021-01-07 DIAGNOSIS — M79.621 PAIN OF RIGHT UPPER ARM: Primary | ICD-10-CM

## 2021-01-07 DIAGNOSIS — M77.11 LATERAL EPICONDYLITIS OF RIGHT ELBOW: ICD-10-CM
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
## 2021-01-07 PROCEDURE — 99202 OFFICE O/P NEW SF 15 MIN: CPT | Performed by: NURSE PRACTITIONER

## 2021-01-07 PROCEDURE — 73080 X-RAY EXAM OF ELBOW: CPT | Performed by: NURSE PRACTITIONER

## 2021-01-07 PROCEDURE — A6449 LT COMPRES BAND >=3" <5"/YD: HCPCS | Performed by: NURSE PRACTITIONER

## 2021-01-07 RX ORDER — CYCLOBENZAPRINE HCL 10 MG
10 TABLET ORAL 3 TIMES DAILY PRN
Qty: 20 TABLET | Refills: 0 | Status: SHIPPED | OUTPATIENT
Start: 2021-01-07 | End: 2021-01-14

## 2021-01-07 RX ORDER — NAPROXEN 500 MG/1
500 TABLET ORAL 2 TIMES DAILY PRN
Qty: 20 TABLET | Refills: 0 | Status: SHIPPED | OUTPATIENT
Start: 2021-01-07 | End: 2021-01-14

## 2021-01-07 RX ORDER — METHYLPREDNISOLONE 4 MG/1
TABLET ORAL
Qty: 1 PACKAGE | Refills: 1 | Status: SHIPPED | OUTPATIENT
Start: 2021-01-07 | End: 2021-01-07 | Stop reason: CLARIF

## 2021-01-07 NOTE — ED INITIAL ASSESSMENT (HPI)
Pt states having right elbow pain that began 6 days ago. Pt denies trauma and states that she woke up with it. 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

## 2021-01-07 NOTE — ED PROVIDER NOTES
Patient Seen in: [facility]      History   Patient presents with:  Elbow Pain    Stated Complaint: RIGHT ARM PAIN    HPI/Subjective:   HPI    Tessa Perez is a 31year-old female presenting with R elbow pain for the past 6 days.  Patient wo negative except as noted above.     Physical Exam     ED Triage Vitals [01/07/21 1009]   /76   Pulse 79   Resp 18   Temp 96.9 °F (36.1 °C)   Temp src Temporal   SpO2 99 %   O2 Device None (Room air)       Current:/76   Pulse 79   Temp 96.9 °F (3 (CPT=73080)   Final Result    PROCEDURE: XR ELBOW, COMPLETE (MIN 3 VIEWS), RIGHT (CPT=73080)         COMPARISON: None. INDICATIONS: Deep right elbow pain for 1 week; no known injury. TECHNIQUE: 4 views were obtained.            FINDINGS: 0    naproxen 500 MG Oral Tab  Take 1 tablet (500 mg total) by mouth 2 (two) times daily as needed.   Qty: 20 tablet Refills: 0

## 2021-01-18 RX ORDER — IBUPROFEN 600 MG/1
TABLET ORAL
Qty: 30 TABLET | Refills: 0 | Status: SHIPPED | OUTPATIENT
Start: 2021-01-18 | End: 2021-08-03

## 2021-01-18 RX ORDER — AMOXICILLIN 250 MG/1
CAPSULE ORAL
Qty: 42 CAPSULE | Refills: 0 | OUTPATIENT
Start: 2021-01-18

## 2021-08-03 ENCOUNTER — OFFICE VISIT (OUTPATIENT)
Dept: FAMILY MEDICINE CLINIC | Facility: CLINIC | Age: 31
End: 2021-08-03
Payer: COMMERCIAL

## 2021-08-03 ENCOUNTER — HOSPITAL ENCOUNTER (OUTPATIENT)
Dept: ULTRASOUND IMAGING | Facility: HOSPITAL | Age: 31
Discharge: HOME | End: 2021-08-03
Attending: FAMILY MEDICINE
Payer: COMMERCIAL

## 2021-08-03 VITALS
DIASTOLIC BLOOD PRESSURE: 60 MMHG | BODY MASS INDEX: 39.59 KG/M2 | HEIGHT: 60.5 IN | SYSTOLIC BLOOD PRESSURE: 104 MMHG | WEIGHT: 207 LBS | TEMPERATURE: 98 F

## 2021-08-03 DIAGNOSIS — R10.2 PELVIC PAIN: ICD-10-CM

## 2021-08-03 DIAGNOSIS — Z00.00 WELLNESS EXAMINATION: Primary | ICD-10-CM

## 2021-08-03 DIAGNOSIS — Z3A.01 LESS THAN 8 WEEKS GESTATION OF PREGNANCY: ICD-10-CM

## 2021-08-03 PROBLEM — O99.210 OBESITY IN PREGNANCY: Status: RESOLVED | Noted: 2020-08-20 | Resolved: 2021-08-03

## 2021-08-03 PROBLEM — R10.9 ABDOMINAL PAIN AFFECTING PREGNANCY (HCC): Status: RESOLVED | Noted: 2020-06-01 | Resolved: 2021-08-03

## 2021-08-03 PROBLEM — Z34.90 SUPERVISION OF NORMAL PREGNANCY: Status: RESOLVED | Noted: 2020-03-10 | Resolved: 2021-08-03

## 2021-08-03 PROBLEM — O47.9 IRREGULAR CONTRACTIONS (HCC): Status: RESOLVED | Noted: 2020-09-05 | Resolved: 2021-08-03

## 2021-08-03 PROBLEM — O47.9 IRREGULAR CONTRACTIONS: Status: RESOLVED | Noted: 2020-09-05 | Resolved: 2021-08-03

## 2021-08-03 PROBLEM — O99.210 OBESITY IN PREGNANCY (HCC): Status: RESOLVED | Noted: 2020-08-20 | Resolved: 2021-08-03

## 2021-08-03 PROBLEM — R10.9 ABDOMINAL PAIN AFFECTING PREGNANCY: Status: RESOLVED | Noted: 2020-06-01 | Resolved: 2021-08-03

## 2021-08-03 PROBLEM — Z34.90 SUPERVISION OF NORMAL PREGNANCY (HCC): Status: RESOLVED | Noted: 2020-03-10 | Resolved: 2021-08-03

## 2021-08-03 PROBLEM — O26.899 ABDOMINAL PAIN AFFECTING PREGNANCY (HCC): Status: RESOLVED | Noted: 2020-06-01 | Resolved: 2021-08-03

## 2021-08-03 PROBLEM — Z34.90 PREGNANCY (HCC): Status: RESOLVED | Noted: 2020-08-23 | Resolved: 2021-08-03

## 2021-08-03 PROBLEM — Z34.90 PREGNANCY: Status: RESOLVED | Noted: 2020-08-23 | Resolved: 2021-08-03

## 2021-08-03 PROBLEM — O26.899 ABDOMINAL PAIN AFFECTING PREGNANCY: Status: RESOLVED | Noted: 2020-06-01 | Resolved: 2021-08-03

## 2021-08-03 LAB
ALBUMIN SERPL-MCNC: 3.7 G/DL (ref 3.4–5)
ALBUMIN/GLOB SERPL: 0.9 {RATIO} (ref 1–2)
ALP LIVER SERPL-CCNC: 72 U/L
ALT SERPL-CCNC: 30 U/L
ANION GAP SERPL CALC-SCNC: 12 MMOL/L (ref 0–18)
APPEARANCE: CLEAR
AST SERPL-CCNC: 13 U/L (ref 15–37)
B-HCG SERPL-ACNC: 8129 MIU/ML
BASOPHILS # BLD AUTO: 0.03 X10(3) UL (ref 0–0.2)
BASOPHILS NFR BLD AUTO: 0.4 %
BILIRUB SERPL-MCNC: 0.3 MG/DL (ref 0.1–2)
BILIRUB UR QL: NEGATIVE
BILIRUBIN: NEGATIVE
BUN BLD-MCNC: 9 MG/DL (ref 7–18)
BUN/CREAT SERPL: 14.3 (ref 10–20)
CALCIUM BLD-MCNC: 9.2 MG/DL (ref 8.5–10.1)
CHLORIDE SERPL-SCNC: 107 MMOL/L (ref 98–112)
CHOLEST SMN-MCNC: 190 MG/DL (ref ?–200)
CO2 SERPL-SCNC: 21 MMOL/L (ref 21–32)
COLOR UR: YELLOW
CONTROL LINE PRESENT WITH A CLEAR BACKGROUND (YES/NO): YES YES/NO
CREAT BLD-MCNC: 0.63 MG/DL
DEPRECATED RDW RBC AUTO: 54.3 FL (ref 35.1–46.3)
EOSINOPHIL # BLD AUTO: 0.19 X10(3) UL (ref 0–0.7)
EOSINOPHIL NFR BLD AUTO: 2.3 %
ERYTHROCYTE [DISTWIDTH] IN BLOOD BY AUTOMATED COUNT: 15.8 % (ref 11–15)
EST. AVERAGE GLUCOSE BLD GHB EST-MCNC: 105 MG/DL (ref 68–126)
GLOBULIN PLAS-MCNC: 3.9 G/DL (ref 2.8–4.4)
GLUCOSE (URINE DIPSTICK): NEGATIVE MG/DL
GLUCOSE BLD-MCNC: 78 MG/DL (ref 70–99)
GLUCOSE UR-MCNC: NEGATIVE MG/DL
HBA1C MFR BLD HPLC: 5.3 % (ref ?–5.7)
HCT VFR BLD AUTO: 43.2 %
HDLC SERPL-MCNC: 56 MG/DL (ref 40–59)
HGB BLD-MCNC: 13.4 G/DL
HGB UR QL STRIP.AUTO: NEGATIVE
IMM GRANULOCYTES # BLD AUTO: 0.04 X10(3) UL (ref 0–1)
IMM GRANULOCYTES NFR BLD: 0.5 %
KETONES (URINE DIPSTICK): NEGATIVE MG/DL
KETONES UR-MCNC: NEGATIVE MG/DL
LDLC SERPL CALC-MCNC: 111 MG/DL (ref ?–100)
LYMPHOCYTES # BLD AUTO: 2.2 X10(3) UL (ref 1–4)
LYMPHOCYTES NFR BLD AUTO: 27.2 %
M PROTEIN MFR SERPL ELPH: 7.6 G/DL (ref 6.4–8.2)
MCH RBC QN AUTO: 29 PG (ref 26–34)
MCHC RBC AUTO-ENTMCNC: 31 G/DL (ref 31–37)
MCV RBC AUTO: 93.5 FL
MONOCYTES # BLD AUTO: 0.52 X10(3) UL (ref 0.1–1)
MONOCYTES NFR BLD AUTO: 6.4 %
MULTISTIX LOT#: 5077 NUMERIC
NEUTROPHILS # BLD AUTO: 5.12 X10 (3) UL (ref 1.5–7.7)
NEUTROPHILS # BLD AUTO: 5.12 X10(3) UL (ref 1.5–7.7)
NEUTROPHILS NFR BLD AUTO: 63.2 %
NITRITE UR QL STRIP.AUTO: NEGATIVE
NITRITE, URINE: NEGATIVE
NONHDLC SERPL-MCNC: 134 MG/DL (ref ?–130)
OCCULT BLOOD: NEGATIVE
OSMOLALITY SERPL CALC.SUM OF ELEC: 288 MOSM/KG (ref 275–295)
PATIENT FASTING Y/N/NP: YES
PATIENT FASTING Y/N/NP: YES
PH UR: 6 [PH] (ref 5–8)
PH, URINE: 5.5 (ref 4.5–8)
PLATELET # BLD AUTO: 265 10(3)UL (ref 150–450)
POTASSIUM SERPL-SCNC: 3.8 MMOL/L (ref 3.5–5.1)
PREGNANCY TEST, URINE: POSITIVE
PROT UR-MCNC: NEGATIVE MG/DL
PROTEIN (URINE DIPSTICK): NEGATIVE MG/DL
RBC # BLD AUTO: 4.62 X10(6)UL
SODIUM SERPL-SCNC: 140 MMOL/L (ref 136–145)
SP GR UR STRIP: 1.01 (ref 1–1.03)
SPECIFIC GRAVITY: 1 (ref 1–1.03)
T4 FREE SERPL-MCNC: 0.7 NG/DL (ref 0.8–1.7)
TRIGL SERPL-MCNC: 133 MG/DL (ref 30–149)
TSI SER-ACNC: 4.61 MIU/ML (ref 0.36–3.74)
URINE-COLOR: YELLOW
UROBILINOGEN UR STRIP-ACNC: <2
UROBILINOGEN,SEMI-QN: 0.2 MG/DL (ref 0–1.9)
VLDLC SERPL CALC-MCNC: 23 MG/DL (ref 0–30)
WBC # BLD AUTO: 8.1 X10(3) UL (ref 4–11)

## 2021-08-03 PROCEDURE — 99395 PREV VISIT EST AGE 18-39: CPT | Performed by: FAMILY MEDICINE

## 2021-08-03 PROCEDURE — 81003 URINALYSIS AUTO W/O SCOPE: CPT | Performed by: FAMILY MEDICINE

## 2021-08-03 PROCEDURE — 84439 ASSAY OF FREE THYROXINE: CPT | Performed by: FAMILY MEDICINE

## 2021-08-03 PROCEDURE — 87808 TRICHOMONAS ASSAY W/OPTIC: CPT | Performed by: FAMILY MEDICINE

## 2021-08-03 PROCEDURE — 87205 SMEAR GRAM STAIN: CPT | Performed by: FAMILY MEDICINE

## 2021-08-03 PROCEDURE — 3008F BODY MASS INDEX DOCD: CPT | Performed by: FAMILY MEDICINE

## 2021-08-03 PROCEDURE — 99212 OFFICE O/P EST SF 10 MIN: CPT | Performed by: FAMILY MEDICINE

## 2021-08-03 PROCEDURE — 76817 TRANSVAGINAL US OBSTETRIC: CPT | Performed by: FAMILY MEDICINE

## 2021-08-03 PROCEDURE — 80061 LIPID PANEL: CPT | Performed by: FAMILY MEDICINE

## 2021-08-03 PROCEDURE — 36415 COLL VENOUS BLD VENIPUNCTURE: CPT | Performed by: FAMILY MEDICINE

## 2021-08-03 PROCEDURE — 83036 HEMOGLOBIN GLYCOSYLATED A1C: CPT | Performed by: FAMILY MEDICINE

## 2021-08-03 PROCEDURE — 3074F SYST BP LT 130 MM HG: CPT | Performed by: FAMILY MEDICINE

## 2021-08-03 PROCEDURE — 87106 FUNGI IDENTIFICATION YEAST: CPT | Performed by: FAMILY MEDICINE

## 2021-08-03 PROCEDURE — 87077 CULTURE AEROBIC IDENTIFY: CPT | Performed by: FAMILY MEDICINE

## 2021-08-03 PROCEDURE — 81025 URINE PREGNANCY TEST: CPT | Performed by: FAMILY MEDICINE

## 2021-08-03 PROCEDURE — 3078F DIAST BP <80 MM HG: CPT | Performed by: FAMILY MEDICINE

## 2021-08-03 PROCEDURE — 80050 GENERAL HEALTH PANEL: CPT | Performed by: FAMILY MEDICINE

## 2021-08-03 PROCEDURE — 87086 URINE CULTURE/COLONY COUNT: CPT | Performed by: FAMILY MEDICINE

## 2021-08-03 PROCEDURE — 76801 OB US < 14 WKS SINGLE FETUS: CPT | Performed by: FAMILY MEDICINE

## 2021-08-03 PROCEDURE — 84702 CHORIONIC GONADOTROPIN TEST: CPT | Performed by: FAMILY MEDICINE

## 2021-08-03 NOTE — PROGRESS NOTES
CC: Annual Physical Exam    HPI:   Hamilton Frankel is a 31year old female who presents for a complete physical exam.    HCM  -Diet:  Well-balanced.   -Exercise active  -Mental Health: denies any depression or anxiety sx  -Skin care:  no concerning lesions/ 34.0 pg    MCHC 33.5 31.0 - 37.0 g/dL    RDW-SD 48.5 (H) 35.1 - 46.3 fL    RDW 14.7 11.0 - 15.0 %    .0 150.0 - 450.0 10(3)uL    Neutrophil Absolute Prelim 6.79 1.50 - 7.70 x10 (3) uL    Neutrophil Absolute 6.79 1.50 - 7.70 x10(3) uL    Lymphocyte A Procedure Laterality Date   • D & C     •       stillbirth @ 6 month   •      • REMOVAL OF KIDNEY STONE Right 2017    Kidney stone removal with assiste with laser      Family History   Problem Relation Age of Onset   • Diabetes Father external ear normal.      Left Ear: Tympanic membrane and external ear normal.   Eyes:      Conjunctiva/sclera: Conjunctivae normal.      Pupils: Pupils are equal, round, and reactive to light. Neck:      Thyroid: No thyromegaly.    Cardiovascular:      R Future    -Vital signs reviewed and wnl  -PE wnl  -Body mass index is 39.76 kg/m².; Discussed healthy life style changes: dieting and exercise      Less than 8 weeks gestation of pregnancy  Pelvic pain  -     HCG, BETA SUBUNIT (QUANT PREGNANCY TEST);  Futur

## 2021-08-05 ENCOUNTER — OFFICE VISIT (OUTPATIENT)
Dept: OBGYN CLINIC | Facility: CLINIC | Age: 31
End: 2021-08-05
Payer: COMMERCIAL

## 2021-08-05 ENCOUNTER — TELEPHONE (OUTPATIENT)
Dept: FAMILY MEDICINE CLINIC | Facility: CLINIC | Age: 31
End: 2021-08-05

## 2021-08-05 VITALS — DIASTOLIC BLOOD PRESSURE: 62 MMHG | BODY MASS INDEX: 39 KG/M2 | WEIGHT: 205 LBS | SYSTOLIC BLOOD PRESSURE: 106 MMHG

## 2021-08-05 DIAGNOSIS — N92.6 MISSED MENSES: Primary | ICD-10-CM

## 2021-08-05 LAB
GENITAL VAGINOSIS SCREEN: NEGATIVE
TRICHOMONAS SCREEN: NEGATIVE

## 2021-08-05 PROCEDURE — 99213 OFFICE O/P EST LOW 20 MIN: CPT | Performed by: OBSTETRICS & GYNECOLOGY

## 2021-08-05 PROCEDURE — 3078F DIAST BP <80 MM HG: CPT | Performed by: OBSTETRICS & GYNECOLOGY

## 2021-08-05 PROCEDURE — 3074F SYST BP LT 130 MM HG: CPT | Performed by: OBSTETRICS & GYNECOLOGY

## 2021-08-05 NOTE — PROGRESS NOTES
HPI/Subjective:   Patient ID: Renard Chang is a 31year old female.     HPI  Missed menses visit  26-year-old  5 para 2-1-1-2 last menstrual period 2021 with a history of irregular menstrual periods coming every 4 to 6 weeks last menstrual

## 2021-08-05 NOTE — TELEPHONE ENCOUNTER
Patient stopped by asking to get her test results with a call back as she was just at the [facility] [place] and told her that she may may have low throid levels. Please advise.

## 2021-08-06 NOTE — TELEPHONE ENCOUNTER
Discussed w/ Dr. Andria Moreno her UTI and thyroid  Sending Amox. Starting thyroid supplementation  Labs discussed with pt.  Repeat thyroid in 6-8wk

## 2021-08-10 ENCOUNTER — LAB ENCOUNTER (OUTPATIENT)
Dept: LAB | Facility: HOSPITAL | Age: 31
End: 2021-08-10
Attending: OBSTETRICS & GYNECOLOGY
Payer: COMMERCIAL

## 2021-08-10 DIAGNOSIS — N92.6 MISSED MENSES: ICD-10-CM

## 2021-08-10 LAB — B-HCG SERPL-ACNC: ABNORMAL MIU/ML

## 2021-08-10 PROCEDURE — 84702 CHORIONIC GONADOTROPIN TEST: CPT

## 2021-08-10 PROCEDURE — 36415 COLL VENOUS BLD VENIPUNCTURE: CPT

## 2021-08-11 ENCOUNTER — OFFICE VISIT (OUTPATIENT)
Dept: OBGYN CLINIC | Facility: CLINIC | Age: 31
End: 2021-08-11
Payer: COMMERCIAL

## 2021-08-11 VITALS — DIASTOLIC BLOOD PRESSURE: 72 MMHG | SYSTOLIC BLOOD PRESSURE: 112 MMHG

## 2021-08-11 DIAGNOSIS — N92.6 MISSED MENSES: Primary | ICD-10-CM

## 2021-08-11 PROCEDURE — 3078F DIAST BP <80 MM HG: CPT | Performed by: OBSTETRICS & GYNECOLOGY

## 2021-08-11 PROCEDURE — 3074F SYST BP LT 130 MM HG: CPT | Performed by: OBSTETRICS & GYNECOLOGY

## 2021-08-11 PROCEDURE — 99212 OFFICE O/P EST SF 10 MIN: CPT | Performed by: OBSTETRICS & GYNECOLOGY

## 2021-08-11 PROCEDURE — 76817 TRANSVAGINAL US OBSTETRIC: CPT | Performed by: OBSTETRICS & GYNECOLOGY

## 2021-08-11 NOTE — PROGRESS NOTES
HPI/Subjective:   Patient ID: Meme Chowdhury is a 31year old female. HPI  Early OB follow-up  Quantitative beta-hCG jasper well to 25,000. Not having cramping or vaginal bleeding.   Transvaginal ultrasound confirms a single live intrauterine gestation at

## 2021-08-18 ENCOUNTER — NURSE ONLY (OUTPATIENT)
Dept: OBGYN CLINIC | Facility: CLINIC | Age: 31
End: 2021-08-18
Payer: COMMERCIAL

## 2021-08-18 DIAGNOSIS — Z34.81 ENCOUNTER FOR SUPERVISION OF OTHER NORMAL PREGNANCY IN FIRST TRIMESTER: Primary | ICD-10-CM

## 2021-08-18 RX ORDER — CHOLECALCIFEROL (VITAMIN D3) 25 MCG
1 TABLET,CHEWABLE ORAL DAILY
COMMUNITY

## 2021-08-18 NOTE — PROGRESS NOTES
Pt here today for RN [facility] Education.     Missed menses apt with: RAYMOND  LMP: 2021    Pre  BMI: 37.63  EPDS score: 0/30  +UPT at home:   [facility] done 8/10/2021    US:  by PRATIBHA  Working SHANE: 3/30/2021 by u/s  Hx of genetic abnormality

## 2021-09-04 ENCOUNTER — LAB ENCOUNTER (OUTPATIENT)
Dept: LAB | Facility: HOSPITAL | Age: 31
End: 2021-09-04
Attending: OBSTETRICS & GYNECOLOGY
Payer: COMMERCIAL

## 2021-09-04 DIAGNOSIS — Z34.81 ENCOUNTER FOR SUPERVISION OF OTHER NORMAL PREGNANCY IN FIRST TRIMESTER: ICD-10-CM

## 2021-09-04 LAB
ANTIBODY SCREEN: NEGATIVE
BASOPHILS # BLD AUTO: 0.03 X10(3) UL (ref 0–0.2)
BASOPHILS NFR BLD AUTO: 0.4 %
BILIRUB UR QL: NEGATIVE
COLOR UR: YELLOW
DEPRECATED RDW RBC AUTO: 46.2 FL (ref 35.1–46.3)
EOSINOPHIL # BLD AUTO: 0.11 X10(3) UL (ref 0–0.7)
EOSINOPHIL NFR BLD AUTO: 1.6 %
ERYTHROCYTE [DISTWIDTH] IN BLOOD BY AUTOMATED COUNT: 13.5 % (ref 11–15)
GLUCOSE 1H P GLC SERPL-MCNC: 112 MG/DL
GLUCOSE UR-MCNC: NEGATIVE MG/DL
HBV SURFACE AG SER-ACNC: 0.12 [IU]/L
HBV SURFACE AG SERPL QL IA: NONREACTIVE
HCT VFR BLD AUTO: 38 %
HGB BLD-MCNC: 12.3 G/DL
HGB UR QL STRIP.AUTO: NEGATIVE
IMM GRANULOCYTES # BLD AUTO: 0.03 X10(3) UL (ref 0–1)
IMM GRANULOCYTES NFR BLD: 0.4 %
KETONES UR-MCNC: NEGATIVE MG/DL
LYMPHOCYTES # BLD AUTO: 1.69 X10(3) UL (ref 1–4)
LYMPHOCYTES NFR BLD AUTO: 24 %
MCH RBC QN AUTO: 29.7 PG (ref 26–34)
MCHC RBC AUTO-ENTMCNC: 32.4 G/DL (ref 31–37)
MCV RBC AUTO: 91.8 FL
MONOCYTES # BLD AUTO: 0.28 X10(3) UL (ref 0.1–1)
MONOCYTES NFR BLD AUTO: 4 %
NEUTROPHILS # BLD AUTO: 4.91 X10 (3) UL (ref 1.5–7.7)
NEUTROPHILS # BLD AUTO: 4.91 X10(3) UL (ref 1.5–7.7)
NEUTROPHILS NFR BLD AUTO: 69.6 %
NITRITE UR QL STRIP.AUTO: NEGATIVE
PH UR: 7 [PH] (ref 5–8)
PLATELET # BLD AUTO: 233 10(3)UL (ref 150–450)
PROT UR-MCNC: 30 MG/DL
RBC # BLD AUTO: 4.14 X10(6)UL
RH BLOOD TYPE: POSITIVE
RUBV IGG SER QL: POSITIVE
RUBV IGG SER-ACNC: 46.9 IU/ML (ref 10–?)
SP GR UR STRIP: 1.02 (ref 1–1.03)
UROBILINOGEN UR STRIP-ACNC: <2
WBC # BLD AUTO: 7.1 X10(3) UL (ref 4–11)

## 2021-09-04 PROCEDURE — 86901 BLOOD TYPING SEROLOGIC RH(D): CPT

## 2021-09-04 PROCEDURE — 86762 RUBELLA ANTIBODY: CPT

## 2021-09-04 PROCEDURE — 85025 COMPLETE CBC W/AUTO DIFF WBC: CPT

## 2021-09-04 PROCEDURE — 36415 COLL VENOUS BLD VENIPUNCTURE: CPT

## 2021-09-04 PROCEDURE — 87340 HEPATITIS B SURFACE AG IA: CPT

## 2021-09-04 PROCEDURE — 86780 TREPONEMA PALLIDUM: CPT

## 2021-09-04 PROCEDURE — 87086 URINE CULTURE/COLONY COUNT: CPT

## 2021-09-04 PROCEDURE — 86900 BLOOD TYPING SEROLOGIC ABO: CPT

## 2021-09-04 PROCEDURE — 82950 GLUCOSE TEST: CPT

## 2021-09-04 PROCEDURE — 86850 RBC ANTIBODY SCREEN: CPT

## 2021-09-04 PROCEDURE — 81001 URINALYSIS AUTO W/SCOPE: CPT

## 2021-09-04 PROCEDURE — 87389 HIV-1 AG W/HIV-1&-2 AB AG IA: CPT

## 2021-09-07 ENCOUNTER — TELEPHONE (OUTPATIENT)
Dept: OBGYN CLINIC | Facility: CLINIC | Age: 31
End: 2021-09-07

## 2021-09-07 DIAGNOSIS — Z34.81 ENCOUNTER FOR SUPERVISION OF OTHER NORMAL PREGNANCY IN FIRST TRIMESTER: Primary | ICD-10-CM

## 2021-09-07 LAB — T PALLIDUM AB SER QL: NEGATIVE

## 2021-09-07 NOTE — TELEPHONE ENCOUNTER
[place] citizen phone line  #[phone] used to translate phone call. Pt called and informed of results and recommendations. Pt voices understanding.  Order placed for U/A.      ----- Message from Karin Jaime MD sent at 9/7/2021 12:00 PM CDT -----  Ple

## 2021-09-09 ENCOUNTER — INITIAL PRENATAL (OUTPATIENT)
Dept: OBGYN CLINIC | Facility: CLINIC | Age: 31
End: 2021-09-09
Payer: COMMERCIAL

## 2021-09-09 VITALS — WEIGHT: 204 LBS | SYSTOLIC BLOOD PRESSURE: 118 MMHG | DIASTOLIC BLOOD PRESSURE: 70 MMHG | BODY MASS INDEX: 39 KG/M2

## 2021-09-09 DIAGNOSIS — O09.299: ICD-10-CM

## 2021-09-09 DIAGNOSIS — E03.9 HYPOTHYROIDISM, UNSPECIFIED TYPE: ICD-10-CM

## 2021-09-09 DIAGNOSIS — Z34.91 NORMAL PREGNANCY IN FIRST TRIMESTER: Primary | ICD-10-CM

## 2021-09-09 PROBLEM — E02 SUBCLINICAL IODINE-DEFICIENCY HYPOTHYROIDISM: Status: RESOLVED | Noted: 2021-09-09 | Resolved: 2021-09-09

## 2021-09-09 PROBLEM — E02 SUBCLINICAL IODINE-DEFICIENCY HYPOTHYROIDISM: Status: ACTIVE | Noted: 2021-09-09

## 2021-09-09 PROBLEM — N92.6 MISSED MENSES: Status: RESOLVED | Noted: 2020-02-27 | Resolved: 2021-09-09

## 2021-09-09 PROBLEM — E03.8 OTHER SPECIFIED HYPOTHYROIDISM: Status: ACTIVE | Noted: 2021-09-09

## 2021-09-09 LAB
APPEARANCE: CLEAR
BILIRUBIN: NEGATIVE
GLUCOSE (URINE DIPSTICK): NEGATIVE MG/DL
KETONES (URINE DIPSTICK): NEGATIVE MG/DL
LEUKOCYTES: NEGATIVE
MULTISTIX LOT#: 5077 NUMERIC
NITRITE, URINE: NEGATIVE
OCCULT BLOOD: NEGATIVE
PH, URINE: 6 (ref 4.5–8)
PROTEIN (URINE DIPSTICK): NEGATIVE MG/DL
SPECIFIC GRAVITY: 1.02 (ref 1–1.03)
URINE-COLOR: YELLOW
UROBILINOGEN,SEMI-QN: 0.2 MG/DL (ref 0–1.9)

## 2021-09-09 PROCEDURE — 3078F DIAST BP <80 MM HG: CPT | Performed by: OBSTETRICS & GYNECOLOGY

## 2021-09-09 PROCEDURE — 3074F SYST BP LT 130 MM HG: CPT | Performed by: OBSTETRICS & GYNECOLOGY

## 2021-09-09 PROCEDURE — 81002 URINALYSIS NONAUTO W/O SCOPE: CPT | Performed by: OBSTETRICS & GYNECOLOGY

## 2021-09-09 NOTE — PROGRESS NOTES
No c/o. Declines genetic screening. Plan 20 week Level 2 ultrasound. 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

## 2021-09-14 LAB
LAST PAP RESULT: NORMAL
PAP HISTORY (OTHER THAN LAST PAP): NORMAL

## 2021-09-15 ENCOUNTER — TELEPHONE (OUTPATIENT)
Dept: OBGYN CLINIC | Facility: CLINIC | Age: 31
End: 2021-09-15

## 2021-09-15 NOTE — TELEPHONE ENCOUNTER
Pt called and informed of results and recommendations. Pt voices understanding.  Pt voices she will have vaginal culture done at her next PNV.      ----- Message from Sacha Baez MD sent at 9/14/2021  8:11 PM CDT -----  Please inform patient that her P

## 2021-09-20 ENCOUNTER — APPOINTMENT (OUTPATIENT)
Dept: GENERAL RADIOLOGY | Age: 31
End: 2021-09-20
Attending: NURSE PRACTITIONER
Payer: COMMERCIAL

## 2021-09-20 ENCOUNTER — HOSPITAL ENCOUNTER (OUTPATIENT)
Age: 31
Discharge: HOME | End: 2021-09-20
Payer: COMMERCIAL

## 2021-09-20 VITALS
OXYGEN SATURATION: 100 % | HEART RATE: 79 BPM | RESPIRATION RATE: 20 BRPM | TEMPERATURE: 98 F | SYSTOLIC BLOOD PRESSURE: 135 MMHG | DIASTOLIC BLOOD PRESSURE: 52 MMHG

## 2021-09-20 DIAGNOSIS — M79.673 HEEL PAIN: Primary | ICD-10-CM

## 2021-09-20 PROCEDURE — 99213 OFFICE O/P EST LOW 20 MIN: CPT | Performed by: NURSE PRACTITIONER

## 2021-09-20 PROCEDURE — 73650 X-RAY EXAM OF HEEL: CPT | Performed by: NURSE PRACTITIONER

## 2021-09-20 NOTE — ED PROVIDER NOTES
Patient Seen in: [facility] [place]      History   No chief complaint on file.     Stated Complaint: left leg pain/swelling    Subjective:   HPI    This is a well-appearing 28-year-old female with a history of hyperlipidemia, who presents with left he 97.6 °F (36.4 °C) (Temporal)   Resp 20   LMP 06/20/2021 (Approximate)   SpO2 100%         Physical Exam  Vitals and nursing note reviewed. Constitutional:       General: She is awake. She is not in acute distress. Appearance: Normal appearance.  She i 9/20/2021 at 3:42 PM     Finalized by (CST): Brien Johns MD on 9/20/2021 at 3:43 PM            XR HEEL (CALCANEUS) (MIN 2 VIEWS), LEFT (CPT=73650)    Result Date: 9/20/2021  CONCLUSION:  1. No acute fracture dislocation.   Small Achilles insertion e

## 2021-09-28 ENCOUNTER — LAB ENCOUNTER (OUTPATIENT)
Dept: LAB | Facility: HOSPITAL | Age: 31
End: 2021-09-28
Attending: FAMILY MEDICINE
Payer: COMMERCIAL

## 2021-09-28 DIAGNOSIS — Z34.81 ENCOUNTER FOR SUPERVISION OF OTHER NORMAL PREGNANCY IN FIRST TRIMESTER: ICD-10-CM

## 2021-09-28 DIAGNOSIS — E03.9 HYPOTHYROIDISM, UNSPECIFIED TYPE: ICD-10-CM

## 2021-09-28 PROCEDURE — 84439 ASSAY OF FREE THYROXINE: CPT

## 2021-09-28 PROCEDURE — 36415 COLL VENOUS BLD VENIPUNCTURE: CPT

## 2021-09-28 PROCEDURE — 81001 URINALYSIS AUTO W/SCOPE: CPT

## 2021-09-28 PROCEDURE — 84443 ASSAY THYROID STIM HORMONE: CPT

## 2021-09-28 PROCEDURE — 84481 FREE ASSAY (FT-3): CPT

## 2021-09-30 ENCOUNTER — TELEPHONE (OUTPATIENT)
Dept: OBGYN CLINIC | Facility: CLINIC | Age: 31
End: 2021-09-30

## 2021-09-30 NOTE — TELEPHONE ENCOUNTER
[employer] message sent to pt.    ----- Message from Hollie Beltrán MD sent at 9/29/2021 10:36 AM CDT -----  Please inform by [employer], mail, or call of normal laboratory tests-- thyroid panel

## 2021-10-01 ENCOUNTER — HOSPITAL ENCOUNTER (OUTPATIENT)
Age: 31
Discharge: HOME | End: 2021-10-01
Payer: COMMERCIAL

## 2021-10-01 VITALS
TEMPERATURE: 97 F | HEART RATE: 94 BPM | RESPIRATION RATE: 18 BRPM | OXYGEN SATURATION: 100 % | SYSTOLIC BLOOD PRESSURE: 124 MMHG | DIASTOLIC BLOOD PRESSURE: 71 MMHG

## 2021-10-01 DIAGNOSIS — Z20.822 LAB TEST NEGATIVE FOR COVID-19 VIRUS: ICD-10-CM

## 2021-10-01 DIAGNOSIS — Z20.822 ENCOUNTER FOR LABORATORY TESTING FOR COVID-19 VIRUS: ICD-10-CM

## 2021-10-01 DIAGNOSIS — B34.9 VIRAL ILLNESS: Primary | ICD-10-CM

## 2021-10-01 PROCEDURE — 99213 OFFICE O/P EST LOW 20 MIN: CPT | Performed by: NURSE PRACTITIONER

## 2021-10-01 PROCEDURE — 87880 STREP A ASSAY W/OPTIC: CPT | Performed by: NURSE PRACTITIONER

## 2021-10-01 PROCEDURE — U0002 COVID-19 LAB TEST NON-CDC: HCPCS | Performed by: NURSE PRACTITIONER

## 2021-10-01 NOTE — ED PROVIDER NOTES
Patient Seen in: [facility]      History   Patient presents with:  Cough/URI  Vomiting  Sore Throat    Stated Complaint: COUGH SORE THORAT VOMITING    Subjective:   Well-appearing 25-year-old female presents with a cough for the past 3 days. Alcohol use: Not Currently      Comment: socially    Drug use: Never             Review of Systems    Positive for stated complaint: COUGH SORE [id number] [facility]  Other systems are as noted in HPI. Constitutional and vital signs reviewed.       All other sys COVID-19 PCR not detected. Patient denies chest pain, shortness of breath, or abdominal pain/discomfort. I discussed over-the-counter supportive therapy for symptoms. I also discussed close PMD follow-up as well as return/ED precautions.      Disposition

## 2021-10-01 NOTE — ED INITIAL ASSESSMENT (HPI)
Pt here with c/o vomiting, sore throat and upper back pain for 3 days. Pt is 14 weeks pregnant. Denies any other symptoms.

## 2021-10-07 ENCOUNTER — ROUTINE PRENATAL (OUTPATIENT)
Dept: OBGYN CLINIC | Facility: CLINIC | Age: 31
End: 2021-10-07
Payer: COMMERCIAL

## 2021-10-07 VITALS — WEIGHT: 205.38 LBS | DIASTOLIC BLOOD PRESSURE: 78 MMHG | SYSTOLIC BLOOD PRESSURE: 124 MMHG | BODY MASS INDEX: 39 KG/M2

## 2021-10-07 DIAGNOSIS — O09.299: ICD-10-CM

## 2021-10-07 DIAGNOSIS — Z34.82 ENCOUNTER FOR SUPERVISION OF OTHER NORMAL PREGNANCY IN SECOND TRIMESTER: ICD-10-CM

## 2021-10-07 DIAGNOSIS — O99.210 OBESITY IN PREGNANCY: ICD-10-CM

## 2021-10-07 DIAGNOSIS — B96.89 BV (BACTERIAL VAGINOSIS): ICD-10-CM

## 2021-10-07 DIAGNOSIS — N76.0 BV (BACTERIAL VAGINOSIS): ICD-10-CM

## 2021-10-07 DIAGNOSIS — E03.9 HYPOTHYROIDISM, UNSPECIFIED TYPE: Primary | ICD-10-CM

## 2021-10-07 PROCEDURE — 3074F SYST BP LT 130 MM HG: CPT | Performed by: OBSTETRICS & GYNECOLOGY

## 2021-10-07 PROCEDURE — 3078F DIAST BP <80 MM HG: CPT | Performed by: OBSTETRICS & GYNECOLOGY

## 2021-10-07 PROCEDURE — 81002 URINALYSIS NONAUTO W/O SCOPE: CPT | Performed by: OBSTETRICS & GYNECOLOGY

## 2021-10-07 NOTE — PROGRESS NOTES
No C/Os. Patient lost phone number to schedule Level 2 ultrasound due to High BMI. To do Level 2 ultrasound x 5 days. Declines all Genetic screening. Declines Flu vaccine.   Patient thinking about Elective Sterilization and discussed Bilateral Salpingec

## 2021-11-04 NOTE — PROGRESS NOTES
Outpatient Maternal-Fetal Medicine Consultation    Dear Dr. Nell Beal    Thank you for requesting ultrasound evaluation and maternal fetal medicine consultation on your patient Ramu Herndon.   As you are aware she is a 31year old female with a Amador preg virus infection, HIV infection ([facility] Human papilloma virus infection, Hypertension, Infertility, female, Osteoporosis, PID (pelvic inflammatory disease), Sexually transmitted disease, Substance abuse ([facility] Syphilis, Transfusion history, Tuberculosis cont are seen. She understands that ultrasound exam cannot exclude genetic abnormalities and that genetic testing is recommended. The limitations of ultrasound were discussed. See imaging tab for the complete US report.     DISCUSSION  During her visit we dis fetal cognitive development, it is suggested to treat pregnant women with subclinical hypothyroidism.  Because of the changes in thyroid physiology during normal pregnancy, thyroid function tests should be interpreted using trimester-specific TSH and T4 ref obesity-related medical and  complications, rather than an intrinsic predisposition to spontaneous  birth.  Prevention of  birth in these patients, therefore, should be directed toward prevention or management of medical and obstetric rates of small for gestational age infants.     A recent study found that initiating moderate exercise in early pregnancy for obese gravidas significantly reduced the incidence of gestational diabetes, gestational hypertension,  deliveries and C-sect pyelectasis, I advised that she have a fetal echocardiogram.  We discussed recurrence risk for congenital heart defects in the same family. We discussed the recommended plan of care based on her  risk factors.   Kaila had her questions answere

## 2021-11-09 ENCOUNTER — ROUTINE PRENATAL (OUTPATIENT)
Dept: OBGYN CLINIC | Facility: CLINIC | Age: 31
End: 2021-11-09
Payer: COMMERCIAL

## 2021-11-09 VITALS — BODY MASS INDEX: 40 KG/M2 | DIASTOLIC BLOOD PRESSURE: 68 MMHG | WEIGHT: 206.19 LBS | SYSTOLIC BLOOD PRESSURE: 120 MMHG

## 2021-11-09 DIAGNOSIS — Z34.82 ENCOUNTER FOR SUPERVISION OF OTHER NORMAL PREGNANCY IN SECOND TRIMESTER: Primary | ICD-10-CM

## 2021-11-09 PROCEDURE — 81002 URINALYSIS NONAUTO W/O SCOPE: CPT | Performed by: OBSTETRICS & GYNECOLOGY

## 2021-11-09 PROCEDURE — 3074F SYST BP LT 130 MM HG: CPT | Performed by: OBSTETRICS & GYNECOLOGY

## 2021-11-09 PROCEDURE — 3078F DIAST BP <80 MM HG: CPT | Performed by: OBSTETRICS & GYNECOLOGY

## 2021-11-11 ENCOUNTER — HOSPITAL ENCOUNTER (OUTPATIENT)
Dept: PERINATAL CARE | Facility: HOSPITAL | Age: 31
Discharge: HOME | End: 2021-11-11
Attending: OBSTETRICS & GYNECOLOGY
Payer: COMMERCIAL

## 2021-11-11 VITALS
SYSTOLIC BLOOD PRESSURE: 111 MMHG | BODY MASS INDEX: 40 KG/M2 | HEART RATE: 77 BPM | DIASTOLIC BLOOD PRESSURE: 71 MMHG | WEIGHT: 206 LBS

## 2021-11-11 DIAGNOSIS — Z82.79 FAMILY HISTORY OF CONGENITAL HEART DEFECT: ICD-10-CM

## 2021-11-11 DIAGNOSIS — E03.8 OTHER SPECIFIED HYPOTHYROIDISM: ICD-10-CM

## 2021-11-11 DIAGNOSIS — Z87.59 HISTORY OF IUFD: ICD-10-CM

## 2021-11-11 DIAGNOSIS — Z36.3 SCREENING, ANTENATAL, FOR MALFORMATION BY ULTRASOUND: ICD-10-CM

## 2021-11-11 DIAGNOSIS — O35.8XX0 PYELECTASIS OF FETUS ON PRENATAL ULTRASOUND: ICD-10-CM

## 2021-11-11 DIAGNOSIS — O99.212 OBESITY AFFECTING PREGNANCY IN SECOND TRIMESTER: Primary | ICD-10-CM

## 2021-11-11 DIAGNOSIS — O99.212 OBESITY AFFECTING PREGNANCY IN SECOND TRIMESTER: ICD-10-CM

## 2021-11-11 PROCEDURE — 99244 OFF/OP CNSLTJ NEW/EST MOD 40: CPT | Performed by: OBSTETRICS & GYNECOLOGY

## 2021-11-11 PROCEDURE — 76811 OB US DETAILED SNGL FETUS: CPT | Performed by: OBSTETRICS & GYNECOLOGY

## 2021-11-19 ENCOUNTER — TELEPHONE (OUTPATIENT)
Dept: PERINATAL CARE | Facility: HOSPITAL | Age: 31
End: 2021-11-19

## 2021-11-19 NOTE — TELEPHONE ENCOUNTER
Panorama screening results obt  Reviewed by DR Sindy Calderon    Results:  low risk  Low Risk for Aneuploidies, triploidy and Monosomy X  Fetal Sex: Male  Fetal Fraction: 11.4%      Pt states understanding  Copy of results sent for scanning into pt record

## 2021-11-23 ENCOUNTER — OFFICE VISIT (OUTPATIENT)
Dept: FAMILY MEDICINE CLINIC | Facility: CLINIC | Age: 31
End: 2021-11-23
Payer: COMMERCIAL

## 2021-11-23 VITALS
TEMPERATURE: 99 F | OXYGEN SATURATION: 98 % | BODY MASS INDEX: 39 KG/M2 | HEART RATE: 92 BPM | DIASTOLIC BLOOD PRESSURE: 70 MMHG | SYSTOLIC BLOOD PRESSURE: 122 MMHG | WEIGHT: 204 LBS

## 2021-11-23 DIAGNOSIS — O99.282 HYPOTHYROIDISM DURING PREGNANCY IN SECOND TRIMESTER: Primary | ICD-10-CM

## 2021-11-23 DIAGNOSIS — E03.9 HYPOTHYROIDISM DURING PREGNANCY IN SECOND TRIMESTER: Primary | ICD-10-CM

## 2021-11-23 PROCEDURE — 84480 ASSAY TRIIODOTHYRONINE (T3): CPT | Performed by: INTERNAL MEDICINE

## 2021-11-23 PROCEDURE — 3078F DIAST BP <80 MM HG: CPT | Performed by: INTERNAL MEDICINE

## 2021-11-23 PROCEDURE — 36415 COLL VENOUS BLD VENIPUNCTURE: CPT | Performed by: INTERNAL MEDICINE

## 2021-11-23 PROCEDURE — 99214 OFFICE O/P EST MOD 30 MIN: CPT | Performed by: INTERNAL MEDICINE

## 2021-11-23 PROCEDURE — 84443 ASSAY THYROID STIM HORMONE: CPT | Performed by: INTERNAL MEDICINE

## 2021-11-23 PROCEDURE — 3074F SYST BP LT 130 MM HG: CPT | Performed by: INTERNAL MEDICINE

## 2021-11-24 ENCOUNTER — OFFICE VISIT (OUTPATIENT)
Dept: ENDOCRINOLOGY CLINIC | Facility: CLINIC | Age: 31
End: 2021-11-24
Payer: COMMERCIAL

## 2021-11-24 ENCOUNTER — TELEPHONE (OUTPATIENT)
Dept: ENDOCRINOLOGY CLINIC | Facility: CLINIC | Age: 31
End: 2021-11-24

## 2021-11-24 VITALS
WEIGHT: 204 LBS | BODY MASS INDEX: 40.05 KG/M2 | HEIGHT: 60 IN | DIASTOLIC BLOOD PRESSURE: 81 MMHG | SYSTOLIC BLOOD PRESSURE: 123 MMHG | HEART RATE: 94 BPM

## 2021-11-24 DIAGNOSIS — E03.9 HYPOTHYROIDISM AFFECTING PREGNANCY, ANTEPARTUM: Primary | ICD-10-CM

## 2021-11-24 DIAGNOSIS — O99.280 HYPOTHYROIDISM AFFECTING PREGNANCY, ANTEPARTUM: Primary | ICD-10-CM

## 2021-11-24 PROCEDURE — 99243 OFF/OP CNSLTJ NEW/EST LOW 30: CPT | Performed by: INTERNAL MEDICINE

## 2021-11-24 PROCEDURE — 3079F DIAST BP 80-89 MM HG: CPT | Performed by: INTERNAL MEDICINE

## 2021-11-24 PROCEDURE — 3008F BODY MASS INDEX DOCD: CPT | Performed by: INTERNAL MEDICINE

## 2021-11-24 PROCEDURE — 3074F SYST BP LT 130 MM HG: CPT | Performed by: INTERNAL MEDICINE

## 2021-11-24 RX ORDER — LEVOTHYROXINE SODIUM 0.1 MG/1
100 TABLET ORAL
Qty: 90 TABLET | Refills: 0 | Status: SHIPPED | OUTPATIENT
Start: 2021-11-24

## 2021-11-24 NOTE — TELEPHONE ENCOUNTER
Pt saw  today and forgot to get a note for her work. .. pt is asking if  could send a note thru my chart so that her work knows she had appt today.  Please call

## 2021-11-24 NOTE — H&P
New Patient Evaluation - History and Physical    CONSULT - Reason for Visit:  Hypothyroidism during pregnancy Requesting Physician: Dr. Miguel Wharton, Dr. Veronica Barajas:    Hypothyroidism during pregnancy     HISTORY OF PRESENT ILLNESS:   Radha Ryan Comment: socially      Drug use: Never      FAMILY HISTORY:   Family History   Problem Relation Age of Onset   • Diabetes Father    • No Known Problems Mother    • No Known Problems Daughter    • No Known Problems Sister    • Other (renal calculi) Brother 32year old female who has been diagnosed with hypothyroidism during pregnancy  She is on LT4 100 mcg daily and is doing well.    TSH is normal  I reviewed the following    Patient understands that dose will be adjusted as needed every few weeks to achive a

## 2021-11-26 NOTE — PROGRESS NOTES
[facility]  Return Patient Progress Note      HPI:   Patient presents with:  Thyroid Problem      Sal Clark is a 31year old female presenting for:    Has a significant  has a past medical history of Abnormal uterine bleeding, A Cap Take 1 capsule by mouth daily.         PMH:  Past Medical History:   Diagnosis Date   • Abnormal uterine bleeding    • Amenorrhea    • BMI 37.0-37.9, adult 2021   • Decorative tattoo    • Hormone disorder     Per pt had imbalance of progesterone    • Hy Exam          ASSESSMENT AND PLAN:   Patient is a 31year old female who presents primarily presents for:    (O99.282,  E03.9) Hypothyroidism during pregnancy in second trimester  (primary encounter diagnosis)  Plan: TSH W REFLEX TO FREE T4, TRIIODOTHYRONI

## 2021-12-02 NOTE — PROGRESS NOTES
Dawn Grace     Dear Dr. Pj Riley,     Thank you for requesting ultrasound evaluation and maternal fetal medicine consultation on your patient Zamzam Smith.   As you are aware she is a 27year old female [id number] with a Single of the aorta maybe missed. I discussed the results with the patient. Ultrasound Findings:    Single IUP in breech presentation. Placenta is posterior. A 3 vessel cord is noted. Cardiac activity is present at 147 bpm  MVP is 6.9 cm .      See imagi her that she should get the third dose that is recommended by the [employer] [employer] as well as the [address] [place] [address] of OB/GYN and Society of maternal-fetal medicine.   We reviewed the safety data that we have thus far about the vaccine as well as the potential risks of m relevant information, facts as evident, and the clinical opinion of the practitioner.

## 2021-12-07 ENCOUNTER — ROUTINE PRENATAL (OUTPATIENT)
Dept: OBGYN CLINIC | Facility: CLINIC | Age: 31
End: 2021-12-07
Payer: COMMERCIAL

## 2021-12-07 VITALS
HEART RATE: 90 BPM | BODY MASS INDEX: 41 KG/M2 | SYSTOLIC BLOOD PRESSURE: 147 MMHG | DIASTOLIC BLOOD PRESSURE: 85 MMHG | WEIGHT: 212 LBS

## 2021-12-07 DIAGNOSIS — Z34.82 ENCOUNTER FOR SUPERVISION OF OTHER NORMAL PREGNANCY IN SECOND TRIMESTER: Primary | ICD-10-CM

## 2021-12-07 PROBLEM — O35.EXX0 PYELECTASIS OF FETUS ON PRENATAL ULTRASOUND (HCC): Status: ACTIVE | Noted: 2021-12-07

## 2021-12-07 PROBLEM — O35.EXX0 PYELECTASIS OF FETUS ON PRENATAL ULTRASOUND: Status: ACTIVE | Noted: 2021-12-07

## 2021-12-07 PROBLEM — O35.8XX0 PYELECTASIS OF FETUS ON PRENATAL ULTRASOUND: Status: ACTIVE | Noted: 2021-12-07

## 2021-12-07 PROCEDURE — 3077F SYST BP >= 140 MM HG: CPT | Performed by: OBSTETRICS & GYNECOLOGY

## 2021-12-07 PROCEDURE — 81002 URINALYSIS NONAUTO W/O SCOPE: CPT | Performed by: OBSTETRICS & GYNECOLOGY

## 2021-12-07 PROCEDURE — 3079F DIAST BP 80-89 MM HG: CPT | Performed by: OBSTETRICS & GYNECOLOGY

## 2021-12-07 NOTE — PROGRESS NOTES
[facility]  Obstetrics and Gynecology  Prenatal Visit  Jeff Back MD    HPI   Renard Chang is a 27year old.o. [id number] 23w6d weeks. Here for routine prenatal visit and is without complaints.   Patient denies any regular uterine contractions, sp

## 2021-12-09 ENCOUNTER — HOSPITAL ENCOUNTER (OUTPATIENT)
Dept: PERINATAL CARE | Facility: HOSPITAL | Age: 31
Discharge: HOME | End: 2021-12-09
Attending: OBSTETRICS & GYNECOLOGY
Payer: COMMERCIAL

## 2021-12-09 VITALS
SYSTOLIC BLOOD PRESSURE: 134 MMHG | DIASTOLIC BLOOD PRESSURE: 81 MMHG | WEIGHT: 212 LBS | BODY MASS INDEX: 41 KG/M2 | HEART RATE: 108 BPM

## 2021-12-09 DIAGNOSIS — Z82.79 FAMILY HISTORY OF CONGENITAL HEART DEFECT: Primary | ICD-10-CM

## 2021-12-09 DIAGNOSIS — E66.09 CLASS 2 OBESITY DUE TO EXCESS CALORIES WITHOUT SERIOUS COMORBIDITY WITH BODY MASS INDEX (BMI) OF 37.0 TO 37.9 IN ADULT: ICD-10-CM

## 2021-12-09 DIAGNOSIS — Z71.85 VACCINE COUNSELING: ICD-10-CM

## 2021-12-09 DIAGNOSIS — Z82.79 FAMILY HISTORY OF CONGENITAL HEART DEFECT: ICD-10-CM

## 2021-12-09 PROCEDURE — 76827 ECHO EXAM OF FETAL HEART: CPT | Performed by: OBSTETRICS & GYNECOLOGY

## 2021-12-09 PROCEDURE — 99215 OFFICE O/P EST HI 40 MIN: CPT | Performed by: OBSTETRICS & GYNECOLOGY

## 2021-12-09 PROCEDURE — 76825 ECHO EXAM OF FETAL HEART: CPT | Performed by: OBSTETRICS & GYNECOLOGY

## 2021-12-09 PROCEDURE — 93325 DOPPLER ECHO COLOR FLOW MAPG: CPT | Performed by: OBSTETRICS & GYNECOLOGY

## 2022-01-06 ENCOUNTER — TELEPHONE (OUTPATIENT)
Dept: OBGYN CLINIC | Facility: CLINIC | Age: 32
End: 2022-01-06

## 2022-01-06 ENCOUNTER — ROUTINE PRENATAL (OUTPATIENT)
Dept: OBGYN CLINIC | Facility: CLINIC | Age: 32
End: 2022-01-06
Payer: COMMERCIAL

## 2022-01-06 VITALS — SYSTOLIC BLOOD PRESSURE: 122 MMHG | DIASTOLIC BLOOD PRESSURE: 64 MMHG | WEIGHT: 209 LBS | BODY MASS INDEX: 41 KG/M2

## 2022-01-06 DIAGNOSIS — Z34.83 ENCOUNTER FOR SUPERVISION OF OTHER NORMAL PREGNANCY IN THIRD TRIMESTER: Primary | ICD-10-CM

## 2022-01-06 DIAGNOSIS — R31.9 HEMATURIA, UNSPECIFIED TYPE: Primary | ICD-10-CM

## 2022-01-06 PROBLEM — Z30.2 REQUEST FOR STERILIZATION: Status: ACTIVE | Noted: 2022-01-06

## 2022-01-06 LAB
BILIRUBIN: NEGATIVE
GLUCOSE (URINE DIPSTICK): NEGATIVE MG/DL
KETONES (URINE DIPSTICK): NEGATIVE MG/DL
LEUKOCYTES: NEGATIVE
MULTISTIX LOT#: ABNORMAL NUMERIC
NITRITE, URINE: NEGATIVE
PH, URINE: 6.5 (ref 4.5–8)
PROTEIN (URINE DIPSTICK): NEGATIVE MG/DL
SPECIFIC GRAVITY: 1.02 (ref 1–1.03)
URINE-COLOR: YELLOW
UROBILINOGEN,SEMI-QN: 0.2 MG/DL (ref 0–1.9)

## 2022-01-06 PROCEDURE — 3074F SYST BP LT 130 MM HG: CPT | Performed by: OBSTETRICS & GYNECOLOGY

## 2022-01-06 PROCEDURE — 3078F DIAST BP <80 MM HG: CPT | Performed by: OBSTETRICS & GYNECOLOGY

## 2022-01-06 PROCEDURE — 81002 URINALYSIS NONAUTO W/O SCOPE: CPT | Performed by: OBSTETRICS & GYNECOLOGY

## 2022-01-06 NOTE — PROGRESS NOTES
No c/o. Good . Order 28 wk labs.   See MFM plan of care  Interested in permanent tubal sterilization

## 2022-01-06 NOTE — TELEPHONE ENCOUNTER
Please inform patient I would like her to do a formal urinalysis through the lab with reflex to culture as she had a small amount of blood in her urine dip today.

## 2022-01-08 ENCOUNTER — LAB ENCOUNTER (OUTPATIENT)
Dept: LAB | Facility: HOSPITAL | Age: 32
End: 2022-01-08
Attending: OBSTETRICS & GYNECOLOGY
Payer: COMMERCIAL

## 2022-01-08 DIAGNOSIS — E03.9 HYPOTHYROIDISM AFFECTING PREGNANCY, ANTEPARTUM: ICD-10-CM

## 2022-01-08 DIAGNOSIS — E03.9 HYPOTHYROID IN PREGNANCY, ANTEPARTUM, FIRST TRIMESTER: ICD-10-CM

## 2022-01-08 DIAGNOSIS — O99.280 HYPOTHYROIDISM AFFECTING PREGNANCY, ANTEPARTUM: ICD-10-CM

## 2022-01-08 DIAGNOSIS — Z34.82 ENCOUNTER FOR SUPERVISION OF OTHER NORMAL PREGNANCY IN SECOND TRIMESTER: ICD-10-CM

## 2022-01-08 DIAGNOSIS — R31.9 HEMATURIA, UNSPECIFIED TYPE: ICD-10-CM

## 2022-01-08 DIAGNOSIS — O99.281 HYPOTHYROID IN PREGNANCY, ANTEPARTUM, FIRST TRIMESTER: ICD-10-CM

## 2022-01-08 LAB
BASOPHILS # BLD AUTO: 0.05 X10(3) UL (ref 0–0.2)
BASOPHILS NFR BLD AUTO: 0.5 %
BILIRUB UR QL: NEGATIVE
COLOR UR: YELLOW
DEPRECATED RDW RBC AUTO: 47.8 FL (ref 35.1–46.3)
EOSINOPHIL # BLD AUTO: 0.17 X10(3) UL (ref 0–0.7)
EOSINOPHIL NFR BLD AUTO: 1.7 %
ERYTHROCYTE [DISTWIDTH] IN BLOOD BY AUTOMATED COUNT: 14.3 % (ref 11–15)
GLUCOSE 1H P GLC SERPL-MCNC: 104 MG/DL
GLUCOSE UR-MCNC: NEGATIVE MG/DL
HCT VFR BLD AUTO: 33.2 %
HGB BLD-MCNC: 10.8 G/DL
IMM GRANULOCYTES # BLD AUTO: 0.24 X10(3) UL (ref 0–1)
IMM GRANULOCYTES NFR BLD: 2.4 %
KETONES UR-MCNC: NEGATIVE MG/DL
LYMPHOCYTES # BLD AUTO: 2.06 X10(3) UL (ref 1–4)
LYMPHOCYTES NFR BLD AUTO: 20.6 %
MCH RBC QN AUTO: 29.8 PG (ref 26–34)
MCHC RBC AUTO-ENTMCNC: 32.5 G/DL (ref 31–37)
MCV RBC AUTO: 91.5 FL
MONOCYTES # BLD AUTO: 0.48 X10(3) UL (ref 0.1–1)
MONOCYTES NFR BLD AUTO: 4.8 %
NEUTROPHILS # BLD AUTO: 7.02 X10 (3) UL (ref 1.5–7.7)
NEUTROPHILS # BLD AUTO: 7.02 X10(3) UL (ref 1.5–7.7)
NEUTROPHILS NFR BLD AUTO: 70 %
NITRITE UR QL STRIP.AUTO: NEGATIVE
PH UR: 7 [PH] (ref 5–8)
PLATELET # BLD AUTO: 264 10(3)UL (ref 150–450)
PROT UR-MCNC: NEGATIVE MG/DL
RBC # BLD AUTO: 3.63 X10(6)UL
SP GR UR STRIP: 1.02 (ref 1–1.03)
T4 FREE SERPL-MCNC: 0.9 NG/DL (ref 0.8–1.7)
TSI SER-ACNC: 0.43 MIU/ML (ref 0.36–3.74)
UROBILINOGEN UR STRIP-ACNC: <2
WBC # BLD AUTO: 10 X10(3) UL (ref 4–11)

## 2022-01-08 PROCEDURE — 85025 COMPLETE CBC W/AUTO DIFF WBC: CPT

## 2022-01-08 PROCEDURE — 84443 ASSAY THYROID STIM HORMONE: CPT

## 2022-01-08 PROCEDURE — 81001 URINALYSIS AUTO W/SCOPE: CPT

## 2022-01-08 PROCEDURE — 84439 ASSAY OF FREE THYROXINE: CPT

## 2022-01-08 PROCEDURE — 36415 COLL VENOUS BLD VENIPUNCTURE: CPT

## 2022-01-08 PROCEDURE — 82950 GLUCOSE TEST: CPT

## 2022-01-08 PROCEDURE — 87086 URINE CULTURE/COLONY COUNT: CPT

## 2022-01-09 PROBLEM — O99.019 ANTEPARTUM ANEMIA (HCC): Status: ACTIVE | Noted: 2022-01-09

## 2022-01-09 PROBLEM — O99.019 ANTEPARTUM ANEMIA: Status: ACTIVE | Noted: 2022-01-09

## 2022-01-10 ENCOUNTER — TELEPHONE (OUTPATIENT)
Dept: OBGYN CLINIC | Facility: CLINIC | Age: 32
End: 2022-01-10

## 2022-01-10 NOTE — TELEPHONE ENCOUNTER
----- Message from Karen Fields MD sent at 1/10/2022 10:00 AM CST -----  Please inform patient that her urine testing did not show urinary infection but there is microscopic red blood cells and some white blood cells and bacteria.   Recommend that she h

## 2022-01-10 NOTE — TELEPHONE ENCOUNTER
Pt was informed about her urine cultures results. Pt verbalized understanding, pt did not have any further questions.          Appointment was schedule for tomorrow with Miladys Tomlinson at [address] [facility].

## 2022-01-11 ENCOUNTER — ROUTINE PRENATAL (OUTPATIENT)
Dept: OBGYN CLINIC | Facility: CLINIC | Age: 32
End: 2022-01-11
Payer: COMMERCIAL

## 2022-01-11 VITALS — DIASTOLIC BLOOD PRESSURE: 62 MMHG | SYSTOLIC BLOOD PRESSURE: 112 MMHG | BODY MASS INDEX: 42 KG/M2 | WEIGHT: 214 LBS

## 2022-01-11 DIAGNOSIS — Z11.3 SCREENING EXAMINATION FOR STD (SEXUALLY TRANSMITTED DISEASE): ICD-10-CM

## 2022-01-11 DIAGNOSIS — Z34.83 ENCOUNTER FOR SUPERVISION OF OTHER NORMAL PREGNANCY IN THIRD TRIMESTER: Primary | ICD-10-CM

## 2022-01-11 LAB
APPEARANCE: CLEAR
BILIRUBIN: NEGATIVE
GLUCOSE (URINE DIPSTICK): NEGATIVE MG/DL
KETONES (URINE DIPSTICK): NEGATIVE MG/DL
LEUKOCYTES: NEGATIVE
MULTISTIX LOT#: ABNORMAL NUMERIC
NITRITE, URINE: NEGATIVE
PH, URINE: 7 (ref 4.5–8)
PROTEIN (URINE DIPSTICK): NEGATIVE MG/DL
SPECIFIC GRAVITY: 1.02 (ref 1–1.03)
URINE-COLOR: YELLOW
UROBILINOGEN,SEMI-QN: 0.2 MG/DL (ref 0–1.9)

## 2022-01-11 PROCEDURE — 3078F DIAST BP <80 MM HG: CPT | Performed by: NURSE PRACTITIONER

## 2022-01-11 PROCEDURE — 90715 TDAP VACCINE 7 YRS/> IM: CPT | Performed by: NURSE PRACTITIONER

## 2022-01-11 PROCEDURE — 90471 IMMUNIZATION ADMIN: CPT | Performed by: NURSE PRACTITIONER

## 2022-01-11 PROCEDURE — 3074F SYST BP LT 130 MM HG: CPT | Performed by: NURSE PRACTITIONER

## 2022-01-11 PROCEDURE — 81003 URINALYSIS AUTO W/O SCOPE: CPT | Performed by: NURSE PRACTITIONER

## 2022-01-11 RX ORDER — BREAST PUMP
EACH MISCELLANEOUS
Qty: 1 EACH | Refills: 0 | Status: SHIPPED | OUTPATIENT
Start: 2022-01-11

## 2022-01-11 NOTE — PROGRESS NOTES
[facility]  Obstetrics and Gynecology  28 Week Prenatal Visit  Jewel Abernathy, MSN, APRN, FNP-BC      HPI   Jennifer Casper is a 27year old. [id number] 28w6d weeks.  SHANE 3/30/2022    Denies vaginal symptoms like changes in discharge, vaginal itch, or vagina capsule by mouth daily. PHYSICAL EXAM   /62   Wt 214 lb (97.1 kg)   LMP 06/20/2021 (Approximate)   BMI 41.79 kg/m²     See flow sheet for FHT's and fundal assessment. Physical Exam  Genitourinary:      No lesions in the vagina.       Right Pump ordered. - NB hospital care discussed - Erythromycin, Vitamin K, and Hep B administration. NB screening, Bilirubin, CCHD, hearing       screen, and typical peds follow up.   - Contraceptive options after delivery reviewed - tubal.  - Encouraged to pro

## 2022-01-12 LAB
C TRACH DNA SPEC QL NAA+PROBE: NEGATIVE
N GONORRHOEA DNA SPEC QL NAA+PROBE: NEGATIVE

## 2022-01-13 ENCOUNTER — TELEPHONE (OUTPATIENT)
Dept: OBGYN CLINIC | Facility: CLINIC | Age: 32
End: 2022-01-13

## 2022-01-13 DIAGNOSIS — B96.89 BACTERIAL VAGINOSIS IN PREGNANCY: Primary | ICD-10-CM

## 2022-01-13 DIAGNOSIS — O23.599 BACTERIAL VAGINOSIS IN PREGNANCY: Primary | ICD-10-CM

## 2022-01-13 LAB
GENITAL VAGINOSIS SCREEN: POSITIVE
TRICHOMONAS SCREEN: NEGATIVE

## 2022-01-13 RX ORDER — METRONIDAZOLE 500 MG/1
500 TABLET ORAL 2 TIMES DAILY
Qty: 14 TABLET | Refills: 0 | Status: SHIPPED | OUTPATIENT
Start: 2022-01-13 | End: 2022-01-20

## 2022-01-13 NOTE — TELEPHONE ENCOUNTER
----- Message from ANALY Carver sent at 1/13/2022  9:01 AM CST -----  Please notify Haroon Siegel that medication for BV is being sent to her pharmacy listed, oral metronidazole 500 mg, two times a day for seven days. She is Turkmen speaking only.  Thank you 1.93

## 2022-01-25 ENCOUNTER — ROUTINE PRENATAL (OUTPATIENT)
Dept: OBGYN CLINIC | Facility: CLINIC | Age: 32
End: 2022-01-25
Payer: COMMERCIAL

## 2022-01-25 VITALS — SYSTOLIC BLOOD PRESSURE: 112 MMHG | WEIGHT: 212 LBS | BODY MASS INDEX: 41 KG/M2 | DIASTOLIC BLOOD PRESSURE: 76 MMHG

## 2022-01-25 DIAGNOSIS — Z34.83 ENCOUNTER FOR SUPERVISION OF OTHER NORMAL PREGNANCY IN THIRD TRIMESTER: Primary | ICD-10-CM

## 2022-01-25 LAB
BILIRUBIN: NEGATIVE
GLUCOSE (URINE DIPSTICK): NEGATIVE MG/DL
MULTISTIX LOT#: ABNORMAL NUMERIC
NITRITE, URINE: NEGATIVE
PH, URINE: 6 (ref 4.5–8)
PROTEIN (URINE DIPSTICK): NEGATIVE MG/DL
SPECIFIC GRAVITY: 1.02 (ref 1–1.03)
URINE-COLOR: YELLOW
UROBILINOGEN,SEMI-QN: 0.2 MG/DL (ref 0–1.9)

## 2022-01-25 PROCEDURE — 3078F DIAST BP <80 MM HG: CPT | Performed by: OBSTETRICS & GYNECOLOGY

## 2022-01-25 PROCEDURE — 3074F SYST BP LT 130 MM HG: CPT | Performed by: OBSTETRICS & GYNECOLOGY

## 2022-01-25 PROCEDURE — 81002 URINALYSIS NONAUTO W/O SCOPE: CPT | Performed by: OBSTETRICS & GYNECOLOGY

## 2022-01-25 NOTE — PROGRESS NOTES
No complaints. Good fetal movements. Finished a course of Flagyl for BV.   Passed her diabetes screen

## 2022-02-03 ENCOUNTER — HOSPITAL ENCOUNTER (OUTPATIENT)
Dept: PERINATAL CARE | Facility: HOSPITAL | Age: 32
Discharge: HOME | End: 2022-02-03
Attending: OBSTETRICS & GYNECOLOGY
Payer: COMMERCIAL

## 2022-02-03 VITALS
WEIGHT: 212 LBS | BODY MASS INDEX: 41 KG/M2 | HEART RATE: 90 BPM | SYSTOLIC BLOOD PRESSURE: 105 MMHG | DIASTOLIC BLOOD PRESSURE: 71 MMHG

## 2022-02-03 DIAGNOSIS — O36.4XX0 IUFD AT 20 WEEKS OR MORE OF GESTATION: ICD-10-CM

## 2022-02-03 DIAGNOSIS — E03.8 OTHER SPECIFIED HYPOTHYROIDISM: ICD-10-CM

## 2022-02-03 DIAGNOSIS — O35.8XX0 PYELECTASIS OF FETUS ON PRENATAL ULTRASOUND: ICD-10-CM

## 2022-02-03 PROCEDURE — 76819 FETAL BIOPHYS PROFIL W/O NST: CPT | Performed by: OBSTETRICS & GYNECOLOGY

## 2022-02-03 PROCEDURE — 99214 OFFICE O/P EST MOD 30 MIN: CPT | Performed by: OBSTETRICS & GYNECOLOGY

## 2022-02-03 PROCEDURE — 76816 OB US FOLLOW-UP PER FETUS: CPT | Performed by: OBSTETRICS & GYNECOLOGY

## 2022-02-08 ENCOUNTER — ROUTINE PRENATAL (OUTPATIENT)
Dept: OBGYN CLINIC | Facility: CLINIC | Age: 32
End: 2022-02-08
Payer: COMMERCIAL

## 2022-02-08 VITALS
HEART RATE: 103 BPM | DIASTOLIC BLOOD PRESSURE: 83 MMHG | WEIGHT: 215 LBS | SYSTOLIC BLOOD PRESSURE: 127 MMHG | BODY MASS INDEX: 42 KG/M2

## 2022-02-08 DIAGNOSIS — Z34.83 ENCOUNTER FOR SUPERVISION OF OTHER NORMAL PREGNANCY IN THIRD TRIMESTER: Primary | ICD-10-CM

## 2022-02-08 LAB
APPEARANCE: CLEAR
BILIRUBIN: NEGATIVE
GLUCOSE (URINE DIPSTICK): NEGATIVE MG/DL
KETONES (URINE DIPSTICK): NEGATIVE MG/DL
LEUKOCYTES: NEGATIVE
MULTISTIX LOT#: ABNORMAL NUMERIC
NITRITE, URINE: NEGATIVE
PH, URINE: 7 (ref 4.5–8)
PROTEIN (URINE DIPSTICK): NEGATIVE MG/DL
SPECIFIC GRAVITY: 1.01 (ref 1–1.03)
URINE-COLOR: YELLOW
UROBILINOGEN,SEMI-QN: 0.2 MG/DL (ref 0–1.9)

## 2022-02-08 PROCEDURE — 81002 URINALYSIS NONAUTO W/O SCOPE: CPT | Performed by: OBSTETRICS & GYNECOLOGY

## 2022-02-08 PROCEDURE — 3079F DIAST BP 80-89 MM HG: CPT | Performed by: OBSTETRICS & GYNECOLOGY

## 2022-02-08 PROCEDURE — 3074F SYST BP LT 130 MM HG: CPT | Performed by: OBSTETRICS & GYNECOLOGY

## 2022-02-09 ENCOUNTER — TELEPHONE (OUTPATIENT)
Dept: OBGYN CLINIC | Facility: CLINIC | Age: 32
End: 2022-02-09

## 2022-02-09 NOTE — TELEPHONE ENCOUNTER
Pt dropped off FMLA forms to be filled out. DANA signed, payment not collected. Pt states forms need to be returned by 2/23/22, would like to  at [phone].

## 2022-02-21 NOTE — TELEPHONE ENCOUNTER
Dr. Chela Plaza,    Pt is requesting early maternity leave starting 2/21/22 at 34 wks due to heavy work at her job. Do you support?     Thank you,  Kenzie Bucio

## 2022-02-21 NOTE — TELEPHONE ENCOUNTER
Pt Name and  verified. Pt provided with another number to call [phone]. Pt will call now as she wants an update on her forms. Routing to forms dept as well.

## 2022-02-22 ENCOUNTER — ROUTINE PRENATAL (OUTPATIENT)
Dept: OBGYN CLINIC | Facility: CLINIC | Age: 32
End: 2022-02-22
Payer: COMMERCIAL

## 2022-02-22 ENCOUNTER — TELEPHONE (OUTPATIENT)
Dept: OBGYN CLINIC | Facility: CLINIC | Age: 32
End: 2022-02-22

## 2022-02-22 VITALS — WEIGHT: 216 LBS | DIASTOLIC BLOOD PRESSURE: 60 MMHG | BODY MASS INDEX: 42 KG/M2 | SYSTOLIC BLOOD PRESSURE: 104 MMHG

## 2022-02-22 DIAGNOSIS — Z34.03 ENCOUNTER FOR SUPERVISION OF NORMAL FIRST PREGNANCY IN THIRD TRIMESTER: Primary | ICD-10-CM

## 2022-02-22 LAB
BILIRUBIN: NEGATIVE
GLUCOSE (URINE DIPSTICK): NEGATIVE MG/DL
KETONES (URINE DIPSTICK): NEGATIVE MG/DL
MULTISTIX LOT#: ABNORMAL NUMERIC
NITRITE, URINE: NEGATIVE
OCCULT BLOOD: NEGATIVE
PH, URINE: 7 (ref 4.5–8)
PROTEIN (URINE DIPSTICK): NEGATIVE MG/DL
SPECIFIC GRAVITY: 1.02 (ref 1–1.03)
URINE-COLOR: YELLOW
UROBILINOGEN,SEMI-QN: 0.2 MG/DL (ref 0–1.9)

## 2022-02-22 PROCEDURE — 3078F DIAST BP <80 MM HG: CPT | Performed by: OBSTETRICS & GYNECOLOGY

## 2022-02-22 PROCEDURE — 81002 URINALYSIS NONAUTO W/O SCOPE: CPT | Performed by: OBSTETRICS & GYNECOLOGY

## 2022-02-22 PROCEDURE — 3074F SYST BP LT 130 MM HG: CPT | Performed by: OBSTETRICS & GYNECOLOGY

## 2022-02-22 NOTE — TELEPHONE ENCOUNTER
Dr. Marva Ohara,     Please sign off on form: FMLA  -Highlight the patient and hit \"Chart\" button. -In Chart Review, w/in the Encounter tab - click 1 time on the Telephone call encounter for 2/9/22 Scroll down the telephone encounter.  -Click \"scan on\" blue Hyperlink under \"Media\" heading for FMLA Dr Marva Ohara 2/22/22  w/in the telephone enc.  -Click on Acknowledge button at the bottom right corner and left-click onto image, signature stamp appears and drag signature to Provider signature line. Stamp will turn blue. Close window.      Thank you,    Shantal Clifford

## 2022-02-22 NOTE — TELEPHONE ENCOUNTER
Patient name and  verified. Patient prefers morning appt time. Patient scheduled for NST on 3/1/2022 at 10 am.  Patient informed of time and date of NST. Advised patient to schedule weekly NST after 3/1 appt with MFM. Verbalized understanding.

## 2022-02-22 NOTE — PROGRESS NOTES
Good fm. Kick cts. Start nst for obesity next week. Pt waiting for [facility] papers for work stoppage, see her 2/9/22 note.

## 2022-02-22 NOTE — TELEPHONE ENCOUNTER
Pt asking about [facility] papers, see 2/9/22 note, dr Radhames Saleem may have authorized work stoppage due to pt's work at [employer], please check on this and notify pt.

## 2022-02-23 ENCOUNTER — LAB ENCOUNTER (OUTPATIENT)
Dept: LAB | Facility: HOSPITAL | Age: 32
End: 2022-02-23
Attending: OBSTETRICS & GYNECOLOGY
Payer: COMMERCIAL

## 2022-02-23 DIAGNOSIS — Z34.83 ENCOUNTER FOR SUPERVISION OF OTHER NORMAL PREGNANCY IN THIRD TRIMESTER: ICD-10-CM

## 2022-02-23 LAB
BASOPHILS # BLD AUTO: 0.04 X10(3) UL (ref 0–0.2)
BASOPHILS NFR BLD AUTO: 0.4 %
DEPRECATED RDW RBC AUTO: 51 FL (ref 35.1–46.3)
EOSINOPHIL # BLD AUTO: 0.12 X10(3) UL (ref 0–0.7)
EOSINOPHIL NFR BLD AUTO: 1.2 %
ERYTHROCYTE [DISTWIDTH] IN BLOOD BY AUTOMATED COUNT: 15.1 % (ref 11–15)
HCT VFR BLD AUTO: 35.5 %
HGB BLD-MCNC: 11.2 G/DL
IMM GRANULOCYTES # BLD AUTO: 0.17 X10(3) UL (ref 0–1)
IMM GRANULOCYTES NFR BLD: 1.7 %
LYMPHOCYTES # BLD AUTO: 2.13 X10(3) UL (ref 1–4)
LYMPHOCYTES NFR BLD AUTO: 21.3 %
MCH RBC QN AUTO: 29.1 PG (ref 26–34)
MCHC RBC AUTO-ENTMCNC: 31.5 G/DL (ref 31–37)
MCV RBC AUTO: 92.2 FL
MONOCYTES # BLD AUTO: 0.6 X10(3) UL (ref 0.1–1)
MONOCYTES NFR BLD AUTO: 6 %
NEUTROPHILS # BLD AUTO: 6.95 X10 (3) UL (ref 1.5–7.7)
NEUTROPHILS # BLD AUTO: 6.95 X10(3) UL (ref 1.5–7.7)
PLATELET # BLD AUTO: 267 10(3)UL (ref 150–450)
RBC # BLD AUTO: 3.85 X10(6)UL
WBC # BLD AUTO: 10 X10(3) UL (ref 4–11)

## 2022-02-23 PROCEDURE — 36415 COLL VENOUS BLD VENIPUNCTURE: CPT

## 2022-02-23 PROCEDURE — 87389 HIV-1 AG W/HIV-1&-2 AB AG IA: CPT

## 2022-02-23 PROCEDURE — 85025 COMPLETE CBC W/AUTO DIFF WBC: CPT

## 2022-02-23 PROCEDURE — 86780 TREPONEMA PALLIDUM: CPT

## 2022-02-23 NOTE — TELEPHONE ENCOUNTER
FMLA completed. Pt will  copy at [facility] .   PLEASE PRINT OUT FOR PT, forms can be printed out under Media tab:  FMLA Dr Karen Redd 2/22/22  Thank you

## 2022-02-25 LAB — T PALLIDUM AB SER QL: NEGATIVE

## 2022-03-01 ENCOUNTER — HOSPITAL ENCOUNTER (OUTPATIENT)
Dept: PERINATAL CARE | Facility: HOSPITAL | Age: 32
Discharge: HOME | End: 2022-03-01
Attending: OBSTETRICS & GYNECOLOGY
Payer: COMMERCIAL

## 2022-03-01 ENCOUNTER — ROUTINE PRENATAL (OUTPATIENT)
Dept: OBGYN CLINIC | Facility: CLINIC | Age: 32
End: 2022-03-01
Payer: COMMERCIAL

## 2022-03-01 VITALS — SYSTOLIC BLOOD PRESSURE: 122 MMHG | DIASTOLIC BLOOD PRESSURE: 64 MMHG | BODY MASS INDEX: 42 KG/M2 | WEIGHT: 216.63 LBS

## 2022-03-01 DIAGNOSIS — Z34.83 ENCOUNTER FOR SUPERVISION OF OTHER NORMAL PREGNANCY IN THIRD TRIMESTER: Primary | ICD-10-CM

## 2022-03-01 LAB
BILIRUBIN: NEGATIVE
GLUCOSE (URINE DIPSTICK): NEGATIVE MG/DL
KETONES (URINE DIPSTICK): NEGATIVE MG/DL
LEUKOCYTES: NEGATIVE
MULTISTIX LOT#: ABNORMAL NUMERIC
NITRITE, URINE: NEGATIVE
PH, URINE: 6.5 (ref 4.5–8)
PROTEIN (URINE DIPSTICK): NEGATIVE MG/DL
SPECIFIC GRAVITY: 1.01 (ref 1–1.03)
URINE-COLOR: YELLOW
UROBILINOGEN,SEMI-QN: 0.2 MG/DL (ref 0–1.9)

## 2022-03-01 PROCEDURE — 3078F DIAST BP <80 MM HG: CPT | Performed by: OBSTETRICS & GYNECOLOGY

## 2022-03-01 PROCEDURE — 81002 URINALYSIS NONAUTO W/O SCOPE: CPT | Performed by: OBSTETRICS & GYNECOLOGY

## 2022-03-01 PROCEDURE — 59025 FETAL NON-STRESS TEST: CPT

## 2022-03-01 PROCEDURE — 3074F SYST BP LT 130 MM HG: CPT | Performed by: OBSTETRICS & GYNECOLOGY

## 2022-03-02 ENCOUNTER — TELEPHONE (OUTPATIENT)
Dept: OBGYN CLINIC | Facility: CLINIC | Age: 32
End: 2022-03-02

## 2022-03-02 ENCOUNTER — HOSPITAL ENCOUNTER (OUTPATIENT)
Facility: HOSPITAL | Age: 32
Discharge: HOME | End: 2022-03-02
Attending: OBSTETRICS & GYNECOLOGY | Admitting: OBSTETRICS & GYNECOLOGY
Payer: COMMERCIAL

## 2022-03-02 VITALS — SYSTOLIC BLOOD PRESSURE: 128 MMHG | DIASTOLIC BLOOD PRESSURE: 69 MMHG | HEART RATE: 99 BPM

## 2022-03-02 PROCEDURE — 59025 FETAL NON-STRESS TEST: CPT | Performed by: OBSTETRICS & GYNECOLOGY

## 2022-03-02 NOTE — PROGRESS NOTES
Pt is a 27year old female admitted to TR4/TR4-A. Patient presents with:  Decreased Fetal Movement     Pt is [id number] 36w0d intra-uterine pregnancy. History obtained, consents signed. Oriented to room, staff, and plan of care.

## 2022-03-02 NOTE — TRIAGE
[facility]      Triage Note    Pamela Singh Patient Status:  Outpatient    12/3/1990 MRN [id number]   Location [address] Attending Kathie Florence MD   Hosp Day # 0 PCP MD Jacinta Yanez: [id number]  Estimated Date of Delivery: 3/30/22  Gestation: 36w0d    Chief Complaint     Decreased Fetal Movement          Allergies:  No Known Allergies    No orders of the defined types were placed in this encounter.       Lab Results   Component Value Date    WBC 10.0 2022    HGB 11.2 (L) 2022    HCT 35.5 2022    .0 2022    CREATSERUM 0.63 2021    BUN 9 2021     2021    K 3.8 2021     2021    CO2 21.0 2021    GLU 78 2021    CA 9.2 2021    ALB 3.7 2021    ALKPHO 72 2021    BILT 0.3 2021    TP 7.6 2021    AST 13 (L) 2021    ALT 30 2021    T4F 0.9 2022    TSH 0.432 2022     2019    DDIMER 0.48 2020    CRP 9.76 (H) 2020    TROP <0.045 2020    CK 21 (L) 2020    FFN Negative 2020       Clinitek UA  Lab Results   Component Value Date    GLUUR Negative 2022    SPECGRAVITY 1.010 2022    URINECUL No Growth at 18-24 hrs. 2022       UA  Lab Results   Component Value Date    COLORUR Yellow 2022    CLARITY Hazy (A) 2022    SPECGRAVITY 1.010 2022    PROUR Negative 2022    GLUUR Negative 2022    KETUR Negative 2022    BILUR Negative 2022    BLOODURINE Small (A) 2022    NITRITE Negative 2022    UROBILINOGEN <2.0 2022    LEUUR Trace (A) 2022    UASA Negative 2019  1215   BP: 128/69   Pulse: 99       NST  Variability: Moderate           Accelerations: Yes           Decelerations: None            Baseline: 135 BPM           Uterine Irritability: No           Contractions: Not present Nonstress Test Interpretation: Reactive           Nonstress Test Second Interpretation: Reactive          FHR Category: Category I             Additional Comments       Patient presents with:  Decreased Fetal Movement  Reactive NST. Pt states feels more movement. Strip reviewed per Dr Abril Sanchez. Pt discharged home with instructions. Follow up with next appointment      Kyung Monroy RN  3/2/2022 1:43 PM    OB note  As above  NST reactive  Patient began to feel good fetal movements. Home with kick counting instructions.

## 2022-03-03 ENCOUNTER — NST DOCUMENTATION (OUTPATIENT)
Dept: OBGYN CLINIC | Facility: CLINIC | Age: 32
End: 2022-03-03

## 2022-03-03 NOTE — NST
Nonstress Test   Patient: Sallie Obrien    Gestation: 36w1d    Diagnosis from order: Obesity affecting pregnancy in third trimester  Inpatient order, no diagnosis associated       NST:         NST DOCUMENTATION 3/2/2022   Variability 6-25 BPM   Accelerations Yes   Decelerations None   Baseline 135   Uterine Irritability No   Contractions Not present   Minutes Between Contractions -   Acoustic Stimulator -   Nonstress Test Interpretation Reactive   Nonstress Test Second Interpretation Reactive   FHR Category Category I   Comments -   NST Completed by -   Disposition -    Testing Plan -   Provider Notified -         I agree with the above evaluation. NST completed.   Kristan Snyder MD  3/3/2022  7:08 AM

## 2022-03-07 NOTE — TELEPHONE ENCOUNTER
Malian  Pt is asking status of [facility] paperwork for disability. Disability states they have not received them. I've given pt the forms number [phone] to call for an update. Disability has been faxing [phone]    Please advise.

## 2022-03-08 ENCOUNTER — TELEPHONE (OUTPATIENT)
Dept: OBGYN CLINIC | Facility: CLINIC | Age: 32
End: 2022-03-08

## 2022-03-08 ENCOUNTER — ROUTINE PRENATAL (OUTPATIENT)
Dept: OBGYN CLINIC | Facility: CLINIC | Age: 32
End: 2022-03-08
Payer: COMMERCIAL

## 2022-03-08 ENCOUNTER — HOSPITAL ENCOUNTER (OUTPATIENT)
Dept: PERINATAL CARE | Facility: HOSPITAL | Age: 32
Discharge: HOME | End: 2022-03-08
Attending: OBSTETRICS & GYNECOLOGY
Payer: COMMERCIAL

## 2022-03-08 ENCOUNTER — LAB ENCOUNTER (OUTPATIENT)
Dept: LAB | Age: 32
End: 2022-03-08
Attending: OBSTETRICS & GYNECOLOGY
Payer: COMMERCIAL

## 2022-03-08 VITALS — BODY MASS INDEX: 43 KG/M2 | WEIGHT: 217.81 LBS | DIASTOLIC BLOOD PRESSURE: 72 MMHG | SYSTOLIC BLOOD PRESSURE: 109 MMHG

## 2022-03-08 DIAGNOSIS — E66.09 CLASS 2 OBESITY DUE TO EXCESS CALORIES WITHOUT SERIOUS COMORBIDITY WITH BODY MASS INDEX (BMI) OF 37.0 TO 37.9 IN ADULT: Primary | ICD-10-CM

## 2022-03-08 DIAGNOSIS — Z01.818 PREOP TESTING: ICD-10-CM

## 2022-03-08 DIAGNOSIS — Z34.83 ENCOUNTER FOR SUPERVISION OF OTHER NORMAL PREGNANCY IN THIRD TRIMESTER: Primary | ICD-10-CM

## 2022-03-08 LAB
APPEARANCE: CLEAR
BILIRUBIN: NEGATIVE
GLUCOSE (URINE DIPSTICK): NEGATIVE MG/DL
KETONES (URINE DIPSTICK): NEGATIVE MG/DL
MULTISTIX LOT#: ABNORMAL NUMERIC
NITRITE, URINE: NEGATIVE
OCCULT BLOOD: NEGATIVE
PH, URINE: 7 (ref 4.5–8)
PROTEIN (URINE DIPSTICK): NEGATIVE MG/DL
SPECIFIC GRAVITY: 1.01 (ref 1–1.03)
URINE-COLOR: YELLOW
UROBILINOGEN,SEMI-QN: 0.2 MG/DL (ref 0–1.9)

## 2022-03-08 PROCEDURE — 81003 URINALYSIS AUTO W/O SCOPE: CPT | Performed by: OBSTETRICS & GYNECOLOGY

## 2022-03-08 PROCEDURE — 3074F SYST BP LT 130 MM HG: CPT | Performed by: OBSTETRICS & GYNECOLOGY

## 2022-03-08 PROCEDURE — 3078F DIAST BP <80 MM HG: CPT | Performed by: OBSTETRICS & GYNECOLOGY

## 2022-03-08 PROCEDURE — 59025 FETAL NON-STRESS TEST: CPT

## 2022-03-08 NOTE — TELEPHONE ENCOUNTER
Please schedule patient for external cephalic version on 0/63/7191. I am the provider on call that day. Diagnosis is breech presentation.

## 2022-03-08 NOTE — TELEPHONE ENCOUNTER
Called and spoke to pt using  [phone]. Pt informed of date and time of ECV. Order for Covid test was placed and pt scheduled for today. Pt denies further questions.

## 2022-03-09 ENCOUNTER — NST DOCUMENTATION (OUTPATIENT)
Dept: OBGYN CLINIC | Facility: CLINIC | Age: 32
End: 2022-03-09

## 2022-03-09 LAB — SARS-COV-2 RNA RESP QL NAA+PROBE: NOT DETECTED

## 2022-03-09 PROCEDURE — 59025 FETAL NON-STRESS TEST: CPT | Performed by: OBSTETRICS & GYNECOLOGY

## 2022-03-10 ENCOUNTER — HOSPITAL ENCOUNTER (OUTPATIENT)
Facility: HOSPITAL | Age: 32
Discharge: HOME | End: 2022-03-10
Attending: OBSTETRICS & GYNECOLOGY | Admitting: OBSTETRICS & GYNECOLOGY
Payer: COMMERCIAL

## 2022-03-10 ENCOUNTER — APPOINTMENT (OUTPATIENT)
Dept: OBGYN CLINIC | Facility: HOSPITAL | Age: 32
End: 2022-03-10
Payer: COMMERCIAL

## 2022-03-10 VITALS
SYSTOLIC BLOOD PRESSURE: 120 MMHG | DIASTOLIC BLOOD PRESSURE: 70 MMHG | HEART RATE: 97 BPM | RESPIRATION RATE: 16 BRPM | TEMPERATURE: 99 F

## 2022-03-10 LAB — GROUP B STREP BY PCR FOR PCR OVT: NEGATIVE

## 2022-03-10 PROCEDURE — 59025 FETAL NON-STRESS TEST: CPT | Performed by: OBSTETRICS & GYNECOLOGY

## 2022-03-10 PROCEDURE — 76815 OB US LIMITED FETUS(S): CPT | Performed by: OBSTETRICS & GYNECOLOGY

## 2022-03-10 PROCEDURE — 59412 ANTEPARTUM MANIPULATION: CPT | Performed by: OBSTETRICS & GYNECOLOGY

## 2022-03-10 RX ORDER — SODIUM CHLORIDE, SODIUM LACTATE, POTASSIUM CHLORIDE, CALCIUM CHLORIDE 600; 310; 30; 20 MG/100ML; MG/100ML; MG/100ML; MG/100ML
INJECTION, SOLUTION INTRAVENOUS CONTINUOUS
Status: DISCONTINUED | OUTPATIENT
Start: 2022-03-10 | End: 2022-03-10

## 2022-03-10 NOTE — PROGRESS NOTES
Pt is a 27year old female admitted to TR5/TR5-A. Patient presents with:  External Version     Pt is [id number] 37w1d intra-uterine pregnancy. History obtained, consents signed. Oriented to room, staff, and plan of care.

## 2022-03-10 NOTE — NST
Nonstress Test   Patient: Vianey Varela    Gestation: 37w0d    Diagnosis from order: Obesity affecting pregnancy in third trimester  Inpatient order, no diagnosis associated       NST:         NST DOCUMENTATION 3/8/2022   Variability 6-25 BPM   Accelerations Yes   Decelerations None   Baseline 140   Uterine Irritability No   Contractions Not present   Minutes Between Contractions -   Acoustic Stimulator No   Nonstress Test Interpretation Reactive   Nonstress Test Second Interpretation Reactive   FHR Category -   Comments -   NST Completed by Orly LARSON   Disposition Home    Testing Plan Weekly NST   Provider Notified Chela Plaza MD         I agree with the above evaluation. NST completed. Brian Plaza MD  3/9/2022  7:31 PM

## 2022-03-12 NOTE — PROCEDURES
External cephalic version note:    Patient counseled on external cephalic version. Consent was signed. Preprocedure NST was reactive. Ultrasound confirmed footling breech presentation. External cephalic version with forward roll was attempted 3 times. The external cephalic version was unsuccessful. Post procedure NST was reactive. Patient was discharged home and will follow up in the office in 1 week. We will plan on primary  section at 39 weeks unless patient is spontaneous position change of fetus.

## 2022-03-15 ENCOUNTER — HOSPITAL ENCOUNTER (OUTPATIENT)
Dept: PERINATAL CARE | Facility: HOSPITAL | Age: 32
Discharge: HOME | End: 2022-03-15
Attending: OBSTETRICS & GYNECOLOGY
Payer: COMMERCIAL

## 2022-03-15 PROCEDURE — 59025 FETAL NON-STRESS TEST: CPT | Performed by: OBSTETRICS & GYNECOLOGY

## 2022-03-15 NOTE — TELEPHONE ENCOUNTER
Pt is requesting short term disability forms to be completed due to her approved early start leave at 34 wks. With short term disability, documentation needs to be submitted as to why the pt needed to stop work at 34 wks. Can a letter be generated so I can submit to her disability company please? Pt stopped working at 34 wks, 2/21/22 due to heavy work at her job.      Thank you,  Antoinette Pham

## 2022-03-15 NOTE — NST
Nonstress Test   Patient: Ze Calloway    Gestation: 37w6d    Diagnosis from order: Obesity affecting pregnancy in third trimester  Inpatient order, no diagnosis associated       NST: reactive         NST DOCUMENTATION 3/15/2022   Variability 6-25 BPM   Accelerations Yes   Decelerations None   Baseline 140   Uterine Irritability No   Contractions Irregular   Minutes Between Contractions -   Contraction Frequency -   Acoustic Stimulator No   Nonstress Test Interpretation Reactive   Nonstress Test Second Interpretation Reactive   FHR Category -   Comments -   NST Completed by Orly LARSON   Disposition Home    Testing Plan Weekly NST   Provider Notified Jose Arguello MD         I agree with the above evaluation. NST completed.   [facility] NST RM1  3/15/2022  1:52 PM

## 2022-03-15 NOTE — TELEPHONE ENCOUNTER
Dr. Dominic Cotton,     Please sign off on form: Disability   -Highlight the patient and hit \"Chart\" button. -In Chart Review, w/in the Encounter tab - click 1 time on the Telephone call encounter for 2/9/22 Scroll down the telephone encounter.  -Click \"scan on\" blue Hyperlink under \"Media\" heading for Diasb Dr Dominic Cotton 3/15/22  w/in the telephone enc.  -Click on Acknowledge button at the bottom right corner and left-click onto image, signature stamp appears and drag signature to Provider signature line. Stamp will turn blue. Close window.      Thank you,    Shantal Clifford

## 2022-03-16 ENCOUNTER — TELEPHONE (OUTPATIENT)
Dept: OBGYN CLINIC | Facility: CLINIC | Age: 32
End: 2022-03-16

## 2022-03-16 ENCOUNTER — ROUTINE PRENATAL (OUTPATIENT)
Dept: OBGYN CLINIC | Facility: CLINIC | Age: 32
End: 2022-03-16
Payer: COMMERCIAL

## 2022-03-16 ENCOUNTER — HOSPITAL ENCOUNTER (OUTPATIENT)
Dept: PERINATAL CARE | Facility: HOSPITAL | Age: 32
Discharge: HOME | End: 2022-03-16
Attending: OBSTETRICS & GYNECOLOGY
Payer: COMMERCIAL

## 2022-03-16 ENCOUNTER — HOSPITAL ENCOUNTER (OUTPATIENT)
Facility: HOSPITAL | Age: 32
Discharge: HOME | End: 2022-03-16
Attending: OBSTETRICS & GYNECOLOGY | Admitting: OBSTETRICS & GYNECOLOGY
Payer: COMMERCIAL

## 2022-03-16 VITALS — SYSTOLIC BLOOD PRESSURE: 136 MMHG | DIASTOLIC BLOOD PRESSURE: 80 MMHG | WEIGHT: 218 LBS | BODY MASS INDEX: 43 KG/M2

## 2022-03-16 DIAGNOSIS — Z01.818 PREOP TESTING: Primary | ICD-10-CM

## 2022-03-16 DIAGNOSIS — Z34.90 ENCOUNTER FOR SUPERVISION OF NORMAL PREGNANCY, ANTEPARTUM, UNSPECIFIED GRAVIDITY: Primary | ICD-10-CM

## 2022-03-16 DIAGNOSIS — O40.3XX0 POLYHYDRAMNIOS AFFECTING PREGNANCY IN THIRD TRIMESTER: ICD-10-CM

## 2022-03-16 LAB
APPEARANCE: CLEAR
BILIRUBIN: NEGATIVE
GLUCOSE (URINE DIPSTICK): NEGATIVE MG/DL
KETONES (URINE DIPSTICK): NEGATIVE MG/DL
LEUKOCYTES: NEGATIVE
MULTISTIX LOT#: NORMAL NUMERIC
NITRITE, URINE: NEGATIVE
OCCULT BLOOD: NEGATIVE
PH, URINE: 6 (ref 4.5–8)
PROTEIN (URINE DIPSTICK): NEGATIVE MG/DL
URINE-COLOR: YELLOW
UROBILINOGEN,SEMI-QN: 0.2 MG/DL (ref 0–1.9)

## 2022-03-16 PROCEDURE — 3075F SYST BP GE 130 - 139MM HG: CPT | Performed by: OBSTETRICS & GYNECOLOGY

## 2022-03-16 PROCEDURE — 59025 FETAL NON-STRESS TEST: CPT

## 2022-03-16 PROCEDURE — 81002 URINALYSIS NONAUTO W/O SCOPE: CPT | Performed by: OBSTETRICS & GYNECOLOGY

## 2022-03-16 PROCEDURE — 99214 OFFICE O/P EST MOD 30 MIN: CPT

## 2022-03-16 PROCEDURE — 3079F DIAST BP 80-89 MM HG: CPT | Performed by: OBSTETRICS & GYNECOLOGY

## 2022-03-16 PROCEDURE — 76815 OB US LIMITED FETUS(S): CPT | Performed by: OBSTETRICS & GYNECOLOGY

## 2022-03-16 NOTE — TELEPHONE ENCOUNTER
Please schedule the following surgery:    Procedure: Primary  section  Assist: (Y/N or none) OB yes  Date: ; 11:30 am  Dx: Breech presentation at 39 weeks  Pre-op appt: (Y/N or n/a) no  Admission type: (IN/OUT/OBVS) in  Department of discharge(SDS/Floor): Floor  Expected length of stay: 3 days  Procedure length time (please enter amount you are requesting): 45 minutes to 60 minutes  Recovery time (patients always ask): 8 weeks  Medical Clearance: (Y/N) no      ALL Medicaid/including BCBS community: Tubal/ Hyst form MUST be signed (30 days): COVID19 Lab 5062 needs to be placed for all C-sections, IOL & Versions     Message to nurses: Patient was also needing external cephalic version as baby is in a malpresentation oblique/transverse we will attempt prior to possible . Scheduled at 1030.

## 2022-03-16 NOTE — PROGRESS NOTES
To [facility] for ultrasound to determine ROLANDO  in stable condition via wheelchair accompanied by staff.

## 2022-03-16 NOTE — PROGRESS NOTES
Patient returned from [facility] ultrasound department in stable condition via wheelchair.   ROLANDO 20 cm

## 2022-03-16 NOTE — IMAGING NOTE
OB ULTRASOUND REPORT   See imaging tab for complete ultrasound report or in PACS    Single IUP in breech presentation. Placenta is posterior. A 3 vessel cord is noted. Cardiac activity is present at 132 bpm  MVP is 8 cm .  ROLANDO 20.7 cm

## 2022-03-16 NOTE — PROGRESS NOTES
Pt is a 27year old female admitted to TR2/TR2-A. Patient presents with:   Assessment: pt. sent from the office for NST with ROLANDO for decreased FM  Non-stress Test: pt. reports being breech     Pt is [id number] 38w0d intra-uterine pregnancy. History obtained, pt. Oriented to room, staff, and plan of care.

## 2022-03-16 NOTE — PROGRESS NOTES
Reports decreased fetal movements for the last 2 days. Hardly any movements today. Will send for NST and ROLANDO. Had unsuccessful ECV 3/10. Baby is in a malpresentation oblique to transverse. Would seem worth reattempting external cephalic version at 39 weeks prior to schedule . Counseled on option for  section at 39 weeks. Consider option for reattempt at external cephalic version with/without spinal anesthetic.

## 2022-03-21 ENCOUNTER — LAB ENCOUNTER (OUTPATIENT)
Dept: LAB | Age: 32
End: 2022-03-21
Attending: OBSTETRICS & GYNECOLOGY
Payer: COMMERCIAL

## 2022-03-21 DIAGNOSIS — Z01.818 PREOP TESTING: ICD-10-CM

## 2022-03-21 LAB — SARS-COV-2 RNA RESP QL NAA+PROBE: NOT DETECTED

## 2022-03-22 ENCOUNTER — HOSPITAL ENCOUNTER (OUTPATIENT)
Dept: PERINATAL CARE | Facility: HOSPITAL | Age: 32
Discharge: HOME | End: 2022-03-22
Attending: OBSTETRICS & GYNECOLOGY
Payer: COMMERCIAL

## 2022-03-22 PROCEDURE — 59025 FETAL NON-STRESS TEST: CPT

## 2022-03-23 ENCOUNTER — APPOINTMENT (OUTPATIENT)
Dept: OBGYN CLINIC | Facility: HOSPITAL | Age: 32
End: 2022-03-23
Payer: COMMERCIAL

## 2022-03-23 ENCOUNTER — TELEPHONE (OUTPATIENT)
Dept: OBGYN CLINIC | Facility: CLINIC | Age: 32
End: 2022-03-23

## 2022-03-23 ENCOUNTER — HOSPITAL ENCOUNTER (OUTPATIENT)
Facility: HOSPITAL | Age: 32
Discharge: HOME | End: 2022-03-23
Attending: OBSTETRICS & GYNECOLOGY | Admitting: OBSTETRICS & GYNECOLOGY
Payer: COMMERCIAL

## 2022-03-23 VITALS
HEART RATE: 86 BPM | SYSTOLIC BLOOD PRESSURE: 128 MMHG | TEMPERATURE: 98 F | WEIGHT: 218 LBS | DIASTOLIC BLOOD PRESSURE: 78 MMHG | BODY MASS INDEX: 43 KG/M2 | RESPIRATION RATE: 16 BRPM

## 2022-03-23 LAB
DEPRECATED RDW RBC AUTO: 49.1 FL (ref 35.1–46.3)
ERYTHROCYTE [DISTWIDTH] IN BLOOD BY AUTOMATED COUNT: 15.2 % (ref 11–15)
HCT VFR BLD AUTO: 36.3 %
HGB BLD-MCNC: 11.8 G/DL
MCH RBC QN AUTO: 28.9 PG (ref 26–34)
MCHC RBC AUTO-ENTMCNC: 32.5 G/DL (ref 31–37)
MCV RBC AUTO: 89 FL
PLATELET # BLD AUTO: 252 10(3)UL (ref 150–450)
RBC # BLD AUTO: 4.08 X10(6)UL
RH BLOOD TYPE: POSITIVE
WBC # BLD AUTO: 9.6 X10(3) UL (ref 4–11)

## 2022-03-23 PROCEDURE — 59412 ANTEPARTUM MANIPULATION: CPT | Performed by: OBSTETRICS & GYNECOLOGY

## 2022-03-23 PROCEDURE — 76818 FETAL BIOPHYS PROFILE W/NST: CPT | Performed by: OBSTETRICS & GYNECOLOGY

## 2022-03-23 RX ORDER — TERBUTALINE SULFATE 1 MG/ML
0.25 INJECTION, SOLUTION SUBCUTANEOUS ONCE
Status: COMPLETED | OUTPATIENT
Start: 2022-03-23 | End: 2022-03-23

## 2022-03-23 RX ORDER — SODIUM CHLORIDE, SODIUM LACTATE, POTASSIUM CHLORIDE, CALCIUM CHLORIDE 600; 310; 30; 20 MG/100ML; MG/100ML; MG/100ML; MG/100ML
INJECTION, SOLUTION INTRAVENOUS CONTINUOUS
Status: DISCONTINUED | OUTPATIENT
Start: 2022-03-23 | End: 2022-03-23

## 2022-03-23 NOTE — PROCEDURES
Preprocedure diagnosis pregnancy and multipara at 39-0/7 weeks gestational age; breech presentation  Postprocedure diagnosis:  Procedure external cephalic version  Blood type B+  NST reactive  Bedside ultrasound: Adequate amniotic fluid subjectively. Placenta posterior fundal.  Baby in a complete breech presentation with the back to the maternal left. Medications: None terbutaline 0.25 mg subcu  Technique: With appropriate patient relaxation and an empty bladder, the fetal breech was elevated up from the above the symphysis pubis and gentle pressure applied to the fetal occiput and the baby rotated 180 degrees in a forward roll counterclockwise successful in its first attempt. The patient tolerated the procedure well. Baby to be monitored now for an hour. Patient counseled  Fetal movement counting, labor and rupture membrane precautions. Prenatal appointment if undelivered in 1 week. Abdominal binder to be placed by nurses.

## 2022-03-23 NOTE — TELEPHONE ENCOUNTER
Patient name and  verified. Patient needs RO appt for next week. Appt made for 3/28/2022. Aware of time and location.

## 2022-03-26 ENCOUNTER — ANESTHESIA EVENT (OUTPATIENT)
Dept: OBGYN UNIT | Facility: HOSPITAL | Age: 32
End: 2022-03-26
Payer: COMMERCIAL

## 2022-03-26 ENCOUNTER — HOSPITAL ENCOUNTER (INPATIENT)
Facility: HOSPITAL | Age: 32
LOS: 3 days | Discharge: HOME | End: 2022-03-29
Attending: OBSTETRICS & GYNECOLOGY | Admitting: OBSTETRICS & GYNECOLOGY
Payer: COMMERCIAL

## 2022-03-26 ENCOUNTER — ANESTHESIA (OUTPATIENT)
Dept: OBGYN UNIT | Facility: HOSPITAL | Age: 32
End: 2022-03-26
Payer: COMMERCIAL

## 2022-03-26 PROBLEM — O47.9 IRREGULAR CONTRACTIONS (HCC): Status: ACTIVE | Noted: 2022-03-26

## 2022-03-26 PROBLEM — O47.9 IRREGULAR CONTRACTIONS: Status: ACTIVE | Noted: 2022-03-26

## 2022-03-26 LAB
BASOPHILS # BLD AUTO: 0.03 X10(3) UL (ref 0–0.2)
BASOPHILS NFR BLD AUTO: 0.3 %
DEPRECATED RDW RBC AUTO: 50.3 FL (ref 35.1–46.3)
EOSINOPHIL # BLD AUTO: 0.07 X10(3) UL (ref 0–0.7)
EOSINOPHIL NFR BLD AUTO: 0.7 %
ERYTHROCYTE [DISTWIDTH] IN BLOOD BY AUTOMATED COUNT: 15.2 % (ref 11–15)
HCT VFR BLD AUTO: 37.3 %
HGB BLD-MCNC: 11.8 G/DL
IMM GRANULOCYTES # BLD AUTO: 0.1 X10(3) UL (ref 0–1)
IMM GRANULOCYTES NFR BLD: 1 %
LYMPHOCYTES # BLD AUTO: 1.37 X10(3) UL (ref 1–4)
LYMPHOCYTES NFR BLD AUTO: 14.3 %
MCH RBC QN AUTO: 28.7 PG (ref 26–34)
MCHC RBC AUTO-ENTMCNC: 31.6 G/DL (ref 31–37)
MCV RBC AUTO: 90.8 FL
MONOCYTES # BLD AUTO: 0.49 X10(3) UL (ref 0.1–1)
MONOCYTES NFR BLD AUTO: 5.1 %
NEUTROPHILS # BLD AUTO: 7.49 X10 (3) UL (ref 1.5–7.7)
NEUTROPHILS # BLD AUTO: 7.49 X10(3) UL (ref 1.5–7.7)
NEUTROPHILS NFR BLD AUTO: 78.6 %
PLATELET # BLD AUTO: 241 10(3)UL (ref 150–450)
RBC # BLD AUTO: 4.11 X10(6)UL
SARS-COV-2 RNA RESP QL NAA+PROBE: NOT DETECTED
WBC # BLD AUTO: 9.6 X10(3) UL (ref 4–11)

## 2022-03-26 PROCEDURE — 10907ZC DRAINAGE OF AMNIOTIC FLUID, THERAPEUTIC FROM PRODUCTS OF CONCEPTION, VIA NATURAL OR ARTIFICIAL OPENING: ICD-10-PCS | Performed by: OBSTETRICS & GYNECOLOGY

## 2022-03-26 RX ORDER — NALBUPHINE HCL 10 MG/ML
2.5 AMPUL (ML) INJECTION
Status: DISCONTINUED | OUTPATIENT
Start: 2022-03-26 | End: 2022-03-27

## 2022-03-26 RX ORDER — SODIUM CHLORIDE, SODIUM LACTATE, POTASSIUM CHLORIDE, CALCIUM CHLORIDE 600; 310; 30; 20 MG/100ML; MG/100ML; MG/100ML; MG/100ML
INJECTION, SOLUTION INTRAVENOUS AS NEEDED
Status: DISCONTINUED | OUTPATIENT
Start: 2022-03-26 | End: 2022-03-27 | Stop reason: HOSPADM

## 2022-03-26 RX ORDER — LIDOCAINE HYDROCHLORIDE 10 MG/ML
30 INJECTION, SOLUTION EPIDURAL; INFILTRATION; INTRACAUDAL; PERINEURAL ONCE
Status: DISCONTINUED | OUTPATIENT
Start: 2022-03-26 | End: 2022-03-27 | Stop reason: HOSPADM

## 2022-03-26 RX ORDER — BUPIVACAINE HYDROCHLORIDE 2.5 MG/ML
20 INJECTION, SOLUTION EPIDURAL; INFILTRATION; INTRACAUDAL ONCE
Status: DISCONTINUED | OUTPATIENT
Start: 2022-03-26 | End: 2022-03-26

## 2022-03-26 RX ORDER — DEXTROSE, SODIUM CHLORIDE, SODIUM LACTATE, POTASSIUM CHLORIDE, AND CALCIUM CHLORIDE 5; .6; .31; .03; .02 G/100ML; G/100ML; G/100ML; G/100ML; G/100ML
INJECTION, SOLUTION INTRAVENOUS CONTINUOUS
Status: DISCONTINUED | OUTPATIENT
Start: 2022-03-26 | End: 2022-03-27 | Stop reason: HOSPADM

## 2022-03-26 RX ORDER — ACETAMINOPHEN 500 MG
500 TABLET ORAL EVERY 6 HOURS PRN
Status: DISCONTINUED | OUTPATIENT
Start: 2022-03-26 | End: 2022-03-27

## 2022-03-26 RX ORDER — NALBUPHINE HCL 10 MG/ML
2.5 AMPUL (ML) INJECTION
Status: DISCONTINUED | OUTPATIENT
Start: 2022-03-26 | End: 2022-03-26

## 2022-03-26 RX ORDER — LIDOCAINE HYDROCHLORIDE 10 MG/ML
INJECTION, SOLUTION EPIDURAL; INFILTRATION; INTRACAUDAL; PERINEURAL AS NEEDED
Status: DISCONTINUED | OUTPATIENT
Start: 2022-03-26 | End: 2022-03-27 | Stop reason: SURG

## 2022-03-26 RX ORDER — TERBUTALINE SULFATE 1 MG/ML
0.25 INJECTION, SOLUTION SUBCUTANEOUS AS NEEDED
Status: DISCONTINUED | OUTPATIENT
Start: 2022-03-26 | End: 2022-03-27 | Stop reason: HOSPADM

## 2022-03-26 RX ORDER — BUPIVACAINE HCL/0.9 % NACL/PF 0.25 %
5 PLASTIC BAG, INJECTION (ML) EPIDURAL AS NEEDED
Status: DISCONTINUED | OUTPATIENT
Start: 2022-03-26 | End: 2022-03-27

## 2022-03-26 RX ORDER — LIDOCAINE HYDROCHLORIDE AND EPINEPHRINE 15; 5 MG/ML; UG/ML
INJECTION, SOLUTION EPIDURAL AS NEEDED
Status: DISCONTINUED | OUTPATIENT
Start: 2022-03-26 | End: 2022-03-27 | Stop reason: SURG

## 2022-03-26 RX ORDER — BUPIVACAINE HCL/0.9 % NACL/PF 0.25 %
5 PLASTIC BAG, INJECTION (ML) EPIDURAL AS NEEDED
Status: DISCONTINUED | OUTPATIENT
Start: 2022-03-26 | End: 2022-03-26

## 2022-03-26 RX ORDER — TRISODIUM CITRATE DIHYDRATE AND CITRIC ACID MONOHYDRATE 500; 334 MG/5ML; MG/5ML
30 SOLUTION ORAL AS NEEDED
Status: DISCONTINUED | OUTPATIENT
Start: 2022-03-26 | End: 2022-03-27 | Stop reason: HOSPADM

## 2022-03-26 RX ORDER — AMMONIA INHALANTS 0.04 G/.3ML
0.3 INHALANT RESPIRATORY (INHALATION) AS NEEDED
Status: DISCONTINUED | OUTPATIENT
Start: 2022-03-26 | End: 2022-03-27 | Stop reason: HOSPADM

## 2022-03-26 RX ORDER — IBUPROFEN 600 MG/1
600 TABLET ORAL EVERY 6 HOURS PRN
Status: DISCONTINUED | OUTPATIENT
Start: 2022-03-26 | End: 2022-03-27 | Stop reason: HOSPADM

## 2022-03-26 RX ORDER — ONDANSETRON 2 MG/ML
4 INJECTION INTRAMUSCULAR; INTRAVENOUS EVERY 6 HOURS PRN
Status: DISCONTINUED | OUTPATIENT
Start: 2022-03-26 | End: 2022-03-27

## 2022-03-26 RX ORDER — BUPIVACAINE HYDROCHLORIDE 2.5 MG/ML
20 INJECTION, SOLUTION EPIDURAL; INFILTRATION; INTRACAUDAL ONCE
Status: COMPLETED | OUTPATIENT
Start: 2022-03-26 | End: 2022-03-26

## 2022-03-26 RX ADMIN — BUPIVACAINE HYDROCHLORIDE 5 ML: 2.5 INJECTION, SOLUTION EPIDURAL; INFILTRATION; INTRACAUDAL at 13:12:00

## 2022-03-26 RX ADMIN — LIDOCAINE HYDROCHLORIDE AND EPINEPHRINE 5 ML: 15; 5 INJECTION, SOLUTION EPIDURAL at 13:11:00

## 2022-03-26 RX ADMIN — LIDOCAINE HYDROCHLORIDE 3 ML: 10 INJECTION, SOLUTION EPIDURAL; INFILTRATION; INTRACAUDAL; PERINEURAL at 13:08:00

## 2022-03-26 NOTE — ANESTHESIA PROCEDURE NOTES
Labor Analgesia  Performed by: Juan Lowe MD  Authorized by: Juan Lowe MD       General Information and Staff    Start Time:  3/26/2022 1:08 PM  End Time:  3/26/2022 1:18 PM  Anesthesiologist:  Juan Lowe MD  Performed by: Anesthesiologist  Patient Location:  OB  Site Identification: surface landmarks    Reason for Block: labor epidural    Preanesthetic Checklist: patient identified, IV checked, site marked, risks and benefits discussed, monitors and equipment checked, pre-op evaluation, timeout performed, anesthesia consent and sterile technique used      Procedure Details    Patient Position:  Sitting  Prep: ChloraPrep    Monitoring:  Heart rate  Approach:  Midline    Epidural Needle    Injection Technique:  JENNIFER air  Needle Type:  Tuohy  Needle Gauge:  18 G  Needle Length:  3.5 in  Needle Insertion Depth:  7  Location:  L2-3    Spinal Needle      Catheter    Catheter Type:  Multi-orifice  Catheter Size:  20 G  Catheter at Skin Depth:  13  Test Dose:  Negative    Assessment  Sensory Level:  T4    Additional Comments     1st attempt +heme aspiration  2nd attempt easy placement.

## 2022-03-26 NOTE — PROGRESS NOTES
Pt is a 27year old female admitted to [address] [place] [address]. Patient presents with:  Contractions: since 02:00     Pt is [id number] 39w3d intra-uterine pregnancy. History obtained, consents signed. Oriented to room, staff, and plan of care.

## 2022-03-26 NOTE — PROGRESS NOTES
Pt is a 27year old female admitted to TR1/TR1-A. Patient presents with:  Contractions: since 02:00     Pt is [id number] 39w3d intra-uterine pregnancy. History obtained, consents signed. Oriented to room, staff, and plan of care.

## 2022-03-27 LAB
ANTIBODY SCREEN: NEGATIVE
BASOPHILS # BLD AUTO: 0.02 X10(3) UL (ref 0–0.2)
BASOPHILS NFR BLD AUTO: 0.2 %
DEPRECATED RDW RBC AUTO: 50 FL (ref 35.1–46.3)
EOSINOPHIL # BLD AUTO: 0.06 X10(3) UL (ref 0–0.7)
EOSINOPHIL NFR BLD AUTO: 0.5 %
ERYTHROCYTE [DISTWIDTH] IN BLOOD BY AUTOMATED COUNT: 15.1 % (ref 11–15)
HCT VFR BLD AUTO: 32.8 %
HGB BLD-MCNC: 10.5 G/DL
IMM GRANULOCYTES # BLD AUTO: 0.06 X10(3) UL (ref 0–1)
IMM GRANULOCYTES NFR BLD: 0.5 %
LYMPHOCYTES # BLD AUTO: 1.49 X10(3) UL (ref 1–4)
LYMPHOCYTES NFR BLD AUTO: 11.4 %
MCH RBC QN AUTO: 28.7 PG (ref 26–34)
MCHC RBC AUTO-ENTMCNC: 32 G/DL (ref 31–37)
MCV RBC AUTO: 89.6 FL
MONOCYTES # BLD AUTO: 0.57 X10(3) UL (ref 0.1–1)
MONOCYTES NFR BLD AUTO: 4.4 %
NEUTROPHILS # BLD AUTO: 10.85 X10 (3) UL (ref 1.5–7.7)
NEUTROPHILS # BLD AUTO: 10.85 X10(3) UL (ref 1.5–7.7)
NEUTROPHILS NFR BLD AUTO: 83 %
PLATELET # BLD AUTO: 223 10(3)UL (ref 150–450)
RBC # BLD AUTO: 3.66 X10(6)UL
RH BLOOD TYPE: POSITIVE
WBC # BLD AUTO: 13.1 X10(3) UL (ref 4–11)

## 2022-03-27 PROCEDURE — 59400 OBSTETRICAL CARE: CPT | Performed by: OBSTETRICS & GYNECOLOGY

## 2022-03-27 RX ORDER — BISACODYL 10 MG
10 SUPPOSITORY, RECTAL RECTAL ONCE AS NEEDED
Status: DISCONTINUED | OUTPATIENT
Start: 2022-03-27 | End: 2022-03-29

## 2022-03-27 RX ORDER — LEVOTHYROXINE SODIUM 0.05 MG/1
100 TABLET ORAL
Status: DISCONTINUED | OUTPATIENT
Start: 2022-03-27 | End: 2022-03-29

## 2022-03-27 RX ORDER — SIMETHICONE 80 MG
80 TABLET,CHEWABLE ORAL 3 TIMES DAILY PRN
Status: DISCONTINUED | OUTPATIENT
Start: 2022-03-27 | End: 2022-03-29

## 2022-03-27 RX ORDER — ACETAMINOPHEN 500 MG
500 TABLET ORAL EVERY 6 HOURS PRN
Status: DISCONTINUED | OUTPATIENT
Start: 2022-03-27 | End: 2022-03-29

## 2022-03-27 RX ORDER — DOCUSATE SODIUM 100 MG/1
100 CAPSULE, LIQUID FILLED ORAL 2 TIMES DAILY
Status: DISCONTINUED | OUTPATIENT
Start: 2022-03-27 | End: 2022-03-29

## 2022-03-27 RX ORDER — ONDANSETRON 2 MG/ML
4 INJECTION INTRAMUSCULAR; INTRAVENOUS EVERY 6 HOURS PRN
Status: DISCONTINUED | OUTPATIENT
Start: 2022-03-27 | End: 2022-03-29

## 2022-03-27 RX ORDER — AMMONIA INHALANTS 0.04 G/.3ML
0.3 INHALANT RESPIRATORY (INHALATION) AS NEEDED
Status: DISCONTINUED | OUTPATIENT
Start: 2022-03-27 | End: 2022-03-29

## 2022-03-27 RX ORDER — DIAPER,BRIEF,INFANT-TODD,DISP
1 EACH MISCELLANEOUS EVERY 6 HOURS PRN
Status: DISCONTINUED | OUTPATIENT
Start: 2022-03-27 | End: 2022-03-29

## 2022-03-27 RX ORDER — IBUPROFEN 600 MG/1
600 TABLET ORAL EVERY 6 HOURS
Status: DISCONTINUED | OUTPATIENT
Start: 2022-03-27 | End: 2022-03-29

## 2022-03-27 RX ORDER — ACETAMINOPHEN 500 MG
1000 TABLET ORAL EVERY 6 HOURS PRN
Status: DISCONTINUED | OUTPATIENT
Start: 2022-03-27 | End: 2022-03-29

## 2022-03-27 NOTE — PLAN OF CARE
Problem: NORMAL   Goal: Experiences normal transition  Description: INTERVENTIONS:  - Assess and monitor vital signs and lab values. - Encourage skin-to-skin with caregiver for thermoregulation  - Assess signs, symptoms and risk factors for hypoglycemia and follow protocol as needed. - Assess signs, symptoms and risk factors for jaundice risk and follow protocol as needed. - Utilize standard precautions and use personal protective equipment as indicated. Wash hands properly before and after each patient care activity.   - Ensure proper skin care and diapering and educate caregiver. - Follow proper infant identification and infant security measures (secure access to the unit, provider ID, visiting policy, Mediameeting and Kisses system), and educate caregiver. - Ensure proper circumcision care and instruct/demonstrate to caregiver. Outcome: Progressing     Problem: NORMAL   Goal: Total weight loss less than 10% of birth weight  Description: INTERVENTIONS:  - Initiate breastfeeding within first hour after birth. - Encourage rooming-in.  - Assess infant feedings. - Monitor intake and output and daily weight.  - Encourage maternal fluid intake for breastfeeding mother.  - Encourage feeding on-demand or as ordered per pediatrician.  - Educate caregiver on proper bottle-feeding technique as needed. - Provide information about early infant feeding cues (e.g., rooting, lip smacking, sucking fingers/hand) versus late cue of crying.  - Review techniques for breastfeeding moms for expression (breast pumping) and storage of breast milk.   Outcome: Progressing

## 2022-03-27 NOTE — L&D DELIVERY NOTE
[facility]    Vaginal Delivery Note    Kamila Louis Patient Status:  Inpatient    12/3/1990 MRN [id number]   Location [address]  Attending Vanita Fragoso MD   Hosp Day # 1 PCP Gladis Ferguson MD     Delivery     Infant  Date of Delivery: 3/27/2022   Time of Delivery: 12:17 AM  Delivery Type: Normal spontaneous vaginal delivery    Infant Sex  Information for the patient's : Colleen Ernst [[id number]]   male    Infant Birthweight  Information for the patient's : Colleen Ernst [[id number]]   7 lb 15.7 oz (3.62 kg)     Presentation Transverse [2]  Position   Occiput [1] Anterior [1]    Apgars:  1 minute: 9               5 minutes: 9                        10 minutes:      Placenta  Date/Time of Delivery: 3/27/2022 12:21 AM   Delivery: spontaneous  Placenta to Pathology: no  Cord Gases Submitted: no  Cord Blood/Tissue Collection: yes  Cord Complications: single nuchal  Sponge and Needle Counts:  Verified    Maternal Anesthesia: epidural   Episiotomy/Laceration Repair  Episiotomy: none  Laceration: none    Delivery Complications  none    Neonatologist Present: yes  Delivery Comment: Mother and baby in good condition    Intake/Output   QBL:  Quantitative Blood Loss (mL) 300      David Ayala MD, MD  3/27/2022  12:38 AM

## 2022-03-27 NOTE — PROGRESS NOTES
Patient up to bathroom with assist x 2. Voided 275cc. Patient transferred to mother/baby room 364 per wheelchair in stable condition with baby and personal belongings. Accompanied by significant other and staff. Report given to mother/baby [facility].

## 2022-03-28 NOTE — PLAN OF CARE
Problem: POSTPARTUM  Goal: Optimize infant feeding at the breast  Description: INTERVENTIONS:  - Initiate breast feeding within first hour after birth. - Monitor effectiveness of current breast feeding efforts. - Assess support systems available to mother/family.  - Identify cultural beliefs/practices regarding lactation, letdown techniques, maternal food preferences. - Assess mother's knowledge and previous experience with breast feeding.  - Provide information as needed about early infant feeding cues (e.g., rooting, lip smacking, sucking fingers/hand) versus late cue of crying.  - Discuss/demonstrate breast feeding aids (e.g., infant sling, nursing footstool/pillows, and breast pumps). - Encourage mother/other family members to express feelings/concerns, and actively listen. - Educate father/SO about benefits of breast feeding and how to manage common lactation challenges. - Recommend avoidance of specific medications or substances incompatible with breast feeding.  - Assess and monitor for signs of nipple pain/trauma. - Instruct and provide assistance with proper latch. - Review techniques for milk expression (breast pumping) and storage of breast milk. Provide pumping equipment/supplies, instructions and assistance, as needed. - Encourage rooming-in and breast feeding on demand.  - Encourage skin-to-skin contact. - Provide LC support as needed. - Assess for and manage engorgement. - Provide breast feeding education handouts and information on community breast feeding support. Outcome: Progressing  Goal: Establishment of adequate milk supply with medication/procedure interruptions  Description: INTERVENTIONS:  - Review techniques for milk expression (breast pumping). - Provide pumping equipment/supplies, instructions, and assistance until it is safe to breastfeed infant.   Outcome: Progressing  Goal: Appropriate maternal -  bonding  Description: INTERVENTIONS:  - Assess caregiver- interactions. - Assess caregiver's emotional status and coping mechanisms. - Encourage caregiver to participate in  daily care. - Assess support systems available to mother/family.  - Provide /case management support as needed.   Outcome: Progressing

## 2022-03-28 NOTE — PROGRESS NOTES
PPD 1    Pt feels well. No c/o. Alert and oriented, nad  Abd soft ,nontender, fundus firm  Ext nt    A/P PPD 1  Pt doing well. No c/o.

## 2022-03-28 NOTE — LACTATION NOTE
Followed up with mom, voices she was able to express only drops of colostrum and she fed it to baby. Enc mom to call with next feeding for latch assessment voices understanding.

## 2022-03-28 NOTE — LACTATION NOTE
This note was copied from a baby's chart. LACTATION NOTE - INFANT    Evaluation Type  Evaluation Type: Inpatient    Problems & Assessment  Infant Assessment: Skin color: pink or appropriate for ethnicity  Muscle tone: Appropriate for GA    Feeding Assessment  Summary Current Feeding: Adlib;Breastfeeding with formula supplement  Last 24 hour feeding summary: Mom voices she has put baby to the breast but has mainly been formula feedin r/t high bili level and not having enough milk to satisfy baby's hunger. (Fed at 11 formula mom requesting assist at this time to initiate pumping)  Breastfeeding Positions:  (no bf cues fed formula at 11 am enc to call with next feeding for assessment & assist-verbalized understanding)  Other (comment): Enc to call for assessment & assist with next feeding. Assisted mom to initiate pumping at 1135. Pump every time a supplement is given or when baby is ineffectively breastfeeding.  Enc to call prn

## 2022-03-28 NOTE — LACTATION NOTE
LACTATION NOTE - MOTHER      Evaluation Type: Inpatient    Problems identified  Problems identified: Knowledge deficit  Problems Identified Other: Mother voices she has been supplementing because baby's bili level is high and she has very little milk    Maternal history  Maternal history: Anemia;Obesity    Breastfeeding goal  Breastfeeding goal: To maintain breast milk feeding per patient goal    Maternal Assessment  Bilateral Breasts: Soft;Symmetrical  Bilateral Nipples: Everted;WNL  Prior breastfeeding experience (comment below): Multip; Successful  Prior BF experience: comment: voices she has breasfed kids and also cupplemted successfully  Breastfeeding Assistance: Breastfeeding assistance provided with permission (fed baby formula at 80 called to room request help to begin pumping)    Pain assessment  Location/Comment: nipples  Pain scale comment: denies  Treatment of Sore Nipples: Expressed breast milk; Lanolin;Coconut oil (reinforced)    Guidelines for use of:  Breast pump type: Ameda Platinum  Current use of pump[de-identified] Initiated pumping at 1135 mom voices she has very little milk and does not suffice babyvoices he is crying alot because he is hungry and also because baby's bili level is high. Suggested use of pump: Pump each time a supplement is offered;Pump if infant is not latching to breast;Pump 8-12X/24hr  Other (comment): Called to room mom would like to start pumping-voices she has no milk-very little and has been formula feeding, voices she has been baby to the breast for about 10\" and then supplements because baby katelynn -hungry and bili level was high -voices MD recommended ABM. Reinforced basic breastfeeding education for normal  behaviors, signs of adequate lactation I&O, stages of milk production, S&D, frequency, when to expect milk volume increases,hand expressing, pumping schedule / routine, breast massage, establishing / increasing & maintain milk supply.  Support & encouragement given; enc mom to call with next feeding and prn. Board in room updated with call #.

## 2022-03-28 NOTE — PROGRESS NOTES
Dr. Pipe Agrawal notified of pt's c/o numb toes and requested to notify OB. Dr. Tiarra Galeas then notified of above complaint. Orders given to continue to observe condition.

## 2022-03-29 VITALS
SYSTOLIC BLOOD PRESSURE: 131 MMHG | TEMPERATURE: 98 F | OXYGEN SATURATION: 96 % | RESPIRATION RATE: 16 BRPM | HEART RATE: 80 BPM | DIASTOLIC BLOOD PRESSURE: 80 MMHG

## 2022-03-29 RX ORDER — IBUPROFEN 600 MG/1
600 TABLET ORAL EVERY 6 HOURS
Refills: 0 | Status: SHIPPED | COMMUNITY
Start: 2022-03-29

## 2022-03-29 RX ORDER — ACETAMINOPHEN 500 MG
500 TABLET ORAL EVERY 6 HOURS PRN
Refills: 0 | Status: SHIPPED | COMMUNITY
Start: 2022-03-29

## 2022-04-04 NOTE — TELEPHONE ENCOUNTER
Dr. Shira Castillo,     Please sign off on form: Disab update  -Highlight the patient and hit \"Chart\" button. -In Chart Review, w/in the Encounter tab - click 1 time on the Telephone call encounter for 2/9/22 Scroll down the telephone encounter.  -Click \"scan on\" blue Hyperlink under \"Media\" heading for Gualberto Castillo 4/4/22 w/in the telephone enc.  -Click on Acknowledge button at the bottom right corner and left-click onto image, signature stamp appears and drag signature to Provider signature line. Stamp will turn blue. Close window. Thank you,    Jean Culp.

## 2022-04-20 ENCOUNTER — TELEPHONE (OUTPATIENT)
Dept: OBGYN UNIT | Facility: HOSPITAL | Age: 32
End: 2022-04-20

## 2022-05-21 ENCOUNTER — TELEPHONE (OUTPATIENT)
Dept: OBGYN CLINIC | Facility: CLINIC | Age: 32
End: 2022-05-21

## 2022-05-21 ENCOUNTER — POSTPARTUM (OUTPATIENT)
Dept: OBGYN CLINIC | Facility: CLINIC | Age: 32
End: 2022-05-21
Payer: COMMERCIAL

## 2022-05-21 VITALS
BODY MASS INDEX: 40 KG/M2 | HEART RATE: 71 BPM | SYSTOLIC BLOOD PRESSURE: 118 MMHG | WEIGHT: 202.38 LBS | DIASTOLIC BLOOD PRESSURE: 84 MMHG

## 2022-05-21 DIAGNOSIS — Z30.09 CONSULTATION FOR FEMALE STERILIZATION: ICD-10-CM

## 2022-05-21 DIAGNOSIS — Z30.2 REQUEST FOR STERILIZATION: Primary | ICD-10-CM

## 2022-05-21 PROBLEM — O47.9 IRREGULAR CONTRACTIONS (HCC): Status: RESOLVED | Noted: 2022-03-26 | Resolved: 2022-05-21

## 2022-05-21 PROBLEM — O99.019 ANTEPARTUM ANEMIA (HCC): Status: RESOLVED | Noted: 2022-01-09 | Resolved: 2022-05-21

## 2022-05-21 PROBLEM — B96.89 BACTERIAL VAGINOSIS IN PREGNANCY (HCC): Status: RESOLVED | Noted: 2022-01-13 | Resolved: 2022-05-21

## 2022-05-21 PROBLEM — Z34.90 ENCOUNTER FOR SUPERVISION OF NORMAL PREGNANCY, ANTEPARTUM (HCC): Status: RESOLVED | Noted: 2020-03-10 | Resolved: 2022-05-21

## 2022-05-21 PROBLEM — O99.019 ANTEPARTUM ANEMIA: Status: RESOLVED | Noted: 2022-01-09 | Resolved: 2022-05-21

## 2022-05-21 PROBLEM — Z34.90 ENCOUNTER FOR SUPERVISION OF NORMAL PREGNANCY, ANTEPARTUM: Status: RESOLVED | Noted: 2020-03-10 | Resolved: 2022-05-21

## 2022-05-21 PROBLEM — O23.599 BACTERIAL VAGINOSIS IN PREGNANCY: Status: RESOLVED | Noted: 2022-01-13 | Resolved: 2022-05-21

## 2022-05-21 PROBLEM — O47.9 IRREGULAR CONTRACTIONS: Status: RESOLVED | Noted: 2022-03-26 | Resolved: 2022-05-21

## 2022-05-21 PROBLEM — O23.599 BACTERIAL VAGINOSIS IN PREGNANCY (HCC): Status: RESOLVED | Noted: 2022-01-13 | Resolved: 2022-05-21

## 2022-05-21 PROBLEM — B96.89 BACTERIAL VAGINOSIS IN PREGNANCY: Status: RESOLVED | Noted: 2022-01-13 | Resolved: 2022-05-21

## 2022-05-21 PROBLEM — O36.4XX0 IUFD AT 20 WEEKS OR MORE OF GESTATION (HCC): Status: RESOLVED | Noted: 2020-03-10 | Resolved: 2022-05-21

## 2022-05-21 PROBLEM — O35.EXX0 PYELECTASIS OF FETUS ON PRENATAL ULTRASOUND (HCC): Status: RESOLVED | Noted: 2021-12-07 | Resolved: 2022-05-21

## 2022-05-21 PROBLEM — O36.4XX0 IUFD AT 20 WEEKS OR MORE OF GESTATION: Status: RESOLVED | Noted: 2020-03-10 | Resolved: 2022-05-21

## 2022-05-21 PROBLEM — O35.EXX0 PYELECTASIS OF FETUS ON PRENATAL ULTRASOUND: Status: RESOLVED | Noted: 2021-12-07 | Resolved: 2022-05-21

## 2022-05-21 PROBLEM — O35.8XX0 PYELECTASIS OF FETUS ON PRENATAL ULTRASOUND: Status: RESOLVED | Noted: 2021-12-07 | Resolved: 2022-05-21

## 2022-05-21 PROCEDURE — 3079F DIAST BP 80-89 MM HG: CPT | Performed by: OBSTETRICS & GYNECOLOGY

## 2022-05-21 PROCEDURE — 3074F SYST BP LT 130 MM HG: CPT | Performed by: OBSTETRICS & GYNECOLOGY

## 2022-05-21 NOTE — TELEPHONE ENCOUNTER
Please schedule the following surgery:    Procedure: Laparoscopic bilateral salpingectomy with possible laparotomy.   Assist: (Y/N or none) no  Date: Next available, please check for Wednesday, 5/25/2022  Dx: Encounter for female sterilization V25.09  Pre-op appt: (Y/N or n/a) no  Admission type: (IN/OUT/OBVS) 60 minutes  Procedure length time: 60 minutes  Medical Clearance: (Y/N) no  Department of discharge:  Same Day Surgery-yes;    Medicaid/BCBS community: Tubal/ Hyst form MUST be signed (30 days):

## 2022-05-23 ENCOUNTER — LAB ENCOUNTER (OUTPATIENT)
Dept: LAB | Facility: HOSPITAL | Age: 32
End: 2022-05-23
Attending: OBSTETRICS & GYNECOLOGY
Payer: COMMERCIAL

## 2022-05-23 DIAGNOSIS — Z01.818 PRE-OP TESTING: ICD-10-CM

## 2022-05-23 NOTE — TELEPHONE ENCOUNTER
OR has availability on 5/25 following first case. Called and spoke to pt using  [id number]. Pt agreed to surgery date and time. Minor case letter sent to pt's mychart.

## 2022-05-24 LAB — SARS-COV-2 RNA RESP QL NAA+PROBE: NOT DETECTED

## 2022-05-25 ENCOUNTER — ANESTHESIA EVENT (OUTPATIENT)
Dept: SURGERY | Facility: HOSPITAL | Age: 32
End: 2022-05-25
Payer: COMMERCIAL

## 2022-05-25 ENCOUNTER — HOSPITAL ENCOUNTER (OUTPATIENT)
Facility: HOSPITAL | Age: 32
Setting detail: HOSPITAL OUTPATIENT SURGERY
Discharge: HOME | End: 2022-05-25
Attending: OBSTETRICS & GYNECOLOGY | Admitting: OBSTETRICS & GYNECOLOGY
Payer: COMMERCIAL

## 2022-05-25 ENCOUNTER — ANESTHESIA (OUTPATIENT)
Dept: SURGERY | Facility: HOSPITAL | Age: 32
End: 2022-05-25
Payer: COMMERCIAL

## 2022-05-25 VITALS
HEART RATE: 83 BPM | HEIGHT: 60 IN | WEIGHT: 209 LBS | DIASTOLIC BLOOD PRESSURE: 66 MMHG | TEMPERATURE: 98 F | RESPIRATION RATE: 16 BRPM | OXYGEN SATURATION: 98 % | SYSTOLIC BLOOD PRESSURE: 111 MMHG | BODY MASS INDEX: 41.03 KG/M2

## 2022-05-25 DIAGNOSIS — Z01.818 PRE-OP TESTING: Primary | ICD-10-CM

## 2022-05-25 DIAGNOSIS — Z30.2 REQUEST FOR STERILIZATION: ICD-10-CM

## 2022-05-25 LAB — B-HCG UR QL: NEGATIVE

## 2022-05-25 PROCEDURE — 0UT74ZZ RESECTION OF BILATERAL FALLOPIAN TUBES, PERCUTANEOUS ENDOSCOPIC APPROACH: ICD-10-PCS | Performed by: OBSTETRICS & GYNECOLOGY

## 2022-05-25 PROCEDURE — 58661 LAPAROSCOPY REMOVE ADNEXA: CPT | Performed by: OBSTETRICS & GYNECOLOGY

## 2022-05-25 RX ORDER — ROCURONIUM BROMIDE 10 MG/ML
INJECTION, SOLUTION INTRAVENOUS AS NEEDED
Status: DISCONTINUED | OUTPATIENT
Start: 2022-05-25 | End: 2022-05-25 | Stop reason: SURG

## 2022-05-25 RX ORDER — DEXAMETHASONE SODIUM PHOSPHATE 4 MG/ML
VIAL (ML) INJECTION AS NEEDED
Status: DISCONTINUED | OUTPATIENT
Start: 2022-05-25 | End: 2022-05-25 | Stop reason: SURG

## 2022-05-25 RX ORDER — HYDROCODONE BITARTRATE AND ACETAMINOPHEN 5; 325 MG/1; MG/1
1 TABLET ORAL EVERY 6 HOURS PRN
Qty: 4 TABLET | Refills: 0 | Status: SHIPPED | OUTPATIENT
Start: 2022-05-25 | End: 2022-05-27

## 2022-05-25 RX ORDER — NALOXONE HYDROCHLORIDE 0.4 MG/ML
80 INJECTION, SOLUTION INTRAMUSCULAR; INTRAVENOUS; SUBCUTANEOUS AS NEEDED
Status: DISCONTINUED | OUTPATIENT
Start: 2022-05-25 | End: 2022-05-25

## 2022-05-25 RX ORDER — LIDOCAINE HYDROCHLORIDE 10 MG/ML
INJECTION, SOLUTION EPIDURAL; INFILTRATION; INTRACAUDAL; PERINEURAL AS NEEDED
Status: DISCONTINUED | OUTPATIENT
Start: 2022-05-25 | End: 2022-05-25 | Stop reason: SURG

## 2022-05-25 RX ORDER — SODIUM CHLORIDE, SODIUM LACTATE, POTASSIUM CHLORIDE, CALCIUM CHLORIDE 600; 310; 30; 20 MG/100ML; MG/100ML; MG/100ML; MG/100ML
INJECTION, SOLUTION INTRAVENOUS CONTINUOUS
Status: DISCONTINUED | OUTPATIENT
Start: 2022-05-25 | End: 2022-05-25

## 2022-05-25 RX ORDER — HYDROMORPHONE HYDROCHLORIDE 1 MG/ML
0.6 INJECTION, SOLUTION INTRAMUSCULAR; INTRAVENOUS; SUBCUTANEOUS EVERY 5 MIN PRN
Status: DISCONTINUED | OUTPATIENT
Start: 2022-05-25 | End: 2022-05-25

## 2022-05-25 RX ORDER — METOCLOPRAMIDE 10 MG/1
10 TABLET ORAL ONCE
Status: COMPLETED | OUTPATIENT
Start: 2022-05-25 | End: 2022-05-25

## 2022-05-25 RX ORDER — KETOROLAC TROMETHAMINE 30 MG/ML
INJECTION, SOLUTION INTRAMUSCULAR; INTRAVENOUS AS NEEDED
Status: DISCONTINUED | OUTPATIENT
Start: 2022-05-25 | End: 2022-05-25 | Stop reason: SURG

## 2022-05-25 RX ORDER — HYDROMORPHONE HYDROCHLORIDE 1 MG/ML
0.4 INJECTION, SOLUTION INTRAMUSCULAR; INTRAVENOUS; SUBCUTANEOUS EVERY 5 MIN PRN
Status: DISCONTINUED | OUTPATIENT
Start: 2022-05-25 | End: 2022-05-25

## 2022-05-25 RX ORDER — MORPHINE SULFATE 4 MG/ML
4 INJECTION, SOLUTION INTRAMUSCULAR; INTRAVENOUS EVERY 10 MIN PRN
Status: DISCONTINUED | OUTPATIENT
Start: 2022-05-25 | End: 2022-05-25

## 2022-05-25 RX ORDER — BUPIVACAINE HYDROCHLORIDE 2.5 MG/ML
INJECTION, SOLUTION EPIDURAL; INFILTRATION; INTRACAUDAL AS NEEDED
Status: DISCONTINUED | OUTPATIENT
Start: 2022-05-25 | End: 2022-05-25 | Stop reason: HOSPADM

## 2022-05-25 RX ORDER — IBUPROFEN 600 MG/1
600 TABLET ORAL EVERY 6 HOURS PRN
Qty: 30 TABLET | Refills: 0 | Status: SHIPPED | OUTPATIENT
Start: 2022-05-25 | End: 2022-05-31

## 2022-05-25 RX ORDER — MORPHINE SULFATE 10 MG/ML
6 INJECTION, SOLUTION INTRAMUSCULAR; INTRAVENOUS EVERY 10 MIN PRN
Status: DISCONTINUED | OUTPATIENT
Start: 2022-05-25 | End: 2022-05-25

## 2022-05-25 RX ORDER — ONDANSETRON 2 MG/ML
INJECTION INTRAMUSCULAR; INTRAVENOUS AS NEEDED
Status: DISCONTINUED | OUTPATIENT
Start: 2022-05-25 | End: 2022-05-25 | Stop reason: SURG

## 2022-05-25 RX ORDER — ACETAMINOPHEN 500 MG
1000 TABLET ORAL ONCE
Status: COMPLETED | OUTPATIENT
Start: 2022-05-25 | End: 2022-05-25

## 2022-05-25 RX ORDER — MORPHINE SULFATE 4 MG/ML
2 INJECTION, SOLUTION INTRAMUSCULAR; INTRAVENOUS EVERY 10 MIN PRN
Status: DISCONTINUED | OUTPATIENT
Start: 2022-05-25 | End: 2022-05-25

## 2022-05-25 RX ORDER — FAMOTIDINE 20 MG/1
20 TABLET, FILM COATED ORAL ONCE
Status: COMPLETED | OUTPATIENT
Start: 2022-05-25 | End: 2022-05-25

## 2022-05-25 RX ORDER — HYDROMORPHONE HYDROCHLORIDE 1 MG/ML
0.2 INJECTION, SOLUTION INTRAMUSCULAR; INTRAVENOUS; SUBCUTANEOUS EVERY 5 MIN PRN
Status: DISCONTINUED | OUTPATIENT
Start: 2022-05-25 | End: 2022-05-25

## 2022-05-25 RX ADMIN — LIDOCAINE HYDROCHLORIDE 50 MG: 10 INJECTION, SOLUTION EPIDURAL; INFILTRATION; INTRACAUDAL; PERINEURAL at 10:07:00

## 2022-05-25 RX ADMIN — ROCURONIUM BROMIDE 50 MG: 10 INJECTION, SOLUTION INTRAVENOUS at 10:08:00

## 2022-05-25 RX ADMIN — DEXAMETHASONE SODIUM PHOSPHATE 4 MG: 4 MG/ML VIAL (ML) INJECTION at 10:15:00

## 2022-05-25 RX ADMIN — KETOROLAC TROMETHAMINE 30 MG: 30 INJECTION, SOLUTION INTRAMUSCULAR; INTRAVENOUS at 10:40:00

## 2022-05-25 RX ADMIN — ONDANSETRON 4 MG: 2 INJECTION INTRAMUSCULAR; INTRAVENOUS at 10:15:00

## 2022-05-25 RX ADMIN — SODIUM CHLORIDE, SODIUM LACTATE, POTASSIUM CHLORIDE, CALCIUM CHLORIDE: 600; 310; 30; 20 INJECTION, SOLUTION INTRAVENOUS at 10:03:00

## 2022-05-25 NOTE — BRIEF OP NOTE
[facility] POST ANESTHESIA CARE UNIT  Operative Note     Audrey Bring Location: OR   [facility] [id number] MRN [id number]   Admission Date 5/25/2022 Operation Date 5/25/2022   Attending Physician Maritza Champion MD Operating Physician Des Sepulveda MD, MD      Preoperative Diagnosis: Request for sterilization [Z30.2]     Postoperative Diagnosis: Request for sterilization [Z30.2]     Procedure Performed:   LAPAROSCOPIC BILATERAL SALPINGECTOMY      Primary Surgeon: Des Sepulveda MD, MD      Anesthesia: General    IVF: 900 cc    UO: 300 cc    Estimated Blood Loss: Blood Output: 15 mL (5/25/2022 10:46 AM)      Surgical Findings: Normal uterus with normal tubes and ovaries bilaterally     Complications: None     Specimen:   ID Type Source Tests Collected by Time Destination   1 : bilateral fallopian tubes Tissue Fallopian tubes bilateral SURGICAL PATHOLOGY TISSUE Maritza Champion MD 5/25/2022 10:29 AM         Condition: Patient transferred to PACU in good condition        Summary of Case: [id number]     Des Sepulveda MD, MD  5/25/2022  11:03 AM

## 2022-05-25 NOTE — ANESTHESIA PROCEDURE NOTES
Airway  Date/Time: 5/25/2022 10:09 AM  Urgency: Elective      General Information and Staff    Patient location during procedure: OR  Anesthesiologist: Damian Pena MD  Resident/CRNA: Less, Kathern Cushing, CRNA  Performed: CRNA     Indications and Patient Condition  Indications for airway management: anesthesia  Sedation level: deep  Preoxygenated: yes  Patient position: sniffing  Mask difficulty assessment: 1 - vent by mask    Final Airway Details  Final airway type: endotracheal airway      Successful airway: ETT  Cuffed: yes   Successful intubation technique: direct laryngoscopy  Endotracheal tube insertion site: oral  Blade: Fili  Blade size: #3  ETT size (mm): 7.0    Cormack-Lehane Classification: grade I - full view of glottis  Placement verified by: chest auscultation and capnometry   Measured from: lips  ETT to lips (cm): 21  Number of attempts at approach: 1    Additional Comments  Atraumatic, teeth intact per baseline

## 2022-05-25 NOTE — INTERVAL H&P NOTE
Pre-op Diagnosis: Request for sterilization [Z30.2]    The above referenced H&P was reviewed by Sveta Mcfarland MD, MD on 5/25/2022, the patient was examined and no significant changes have occurred in the patient's condition since the H&P was performed. Patient counseled on today's procedure which is a laparoscopic bilateral salpingectomy for permanent sterilization with possible laparotomy. We discussed risks of procedure including infection, bleeding, transfusion in case of hemorrhage and damage other organs such as bowel, bladder, blood vessels etc.  We discussed risk of laparotomy. Patient understands all of the above and has signed consent. I discussed with the patient and/or legal representative the potential benefits, risks and side effects of this procedure; the likelihood of the patient achieving goals; and potential problems that might occur during recuperation. I discussed reasonable alternatives to the procedure, including risks, benefits and side effects related to the alternatives and risks related to not receiving this procedure. We will proceed with procedure as planned.

## 2022-05-26 NOTE — OPERATIVE REPORT
[facility]    PATIENT'S NAME: [place]   ATTENDING PHYSICIAN: Bairon Albert MD   OPERATING PHYSICIAN: Bairon Albert MD   PATIENT ACCOUNT#:   [de-identified]    LOCATION:  [place] [address]  MEDICAL RECORD #:   [id number]       YOB: 1990  ADMISSION DATE:       05/25/2022      OPERATION DATE:  05/25/2022    OPERATIVE REPORT    PREOPERATIVE DIAGNOSIS:  Multiparity, desires permanent sterilization. POSTOPERATIVE DIAGNOSIS:  Multiparity, desires permanent sterilization. PROCEDURE:  Laparoscopic bilateral salpingectomy. ANESTHESIA:  General.    IV FLUIDS:  900 mL. URINE OUTPUT:  300 mL. ESTIMATED BLOOD LOSS:  15 mL. COMPLICATIONS:  None. SPECIMENS:  Bilateral fallopian tubes to Pathology. FINDINGS:  Normal uterus, normal tubes, and ovaries bilaterally. OPERATIVE TECHNIQUE:  After informed consent was obtained, the patient was prepped and draped in usual sterile fashion in dorsal lithotomy position. A HUMI uterine manipulator was placed without difficulty. A Godoy catheter was placed without difficulty. Attention was then turned to the abdomen where 0.25% Marcaine was used subcutaneously in the infraumbilical region. A 5 mm skin incision was made with a scalpel. A 5 mm trocar was placed through the skin incision. A Veress needle was placed through the skin incision. CO2 pneumoperitoneum was created with beginning and ending pressures of 7 and 15 respectively. After an adequate CO2 pneumoperitoneum was created, a 5 mm trocar was placed through the infraumbilical skin incision. A laparoscope was placed through the trocar. Intraperitoneal placement was confirmed with no evidence of trauma or bleeding noted. Assist ports were then placed in the left upper and left lower quadrant approximately 8 to 9 cm lateral to the umbilicus. They were placed with the same technique.   Then, 0.25% Marcaine was used, skin incision was made with a scalpel, and the trocars were placed intraperitoneally under direct visualization with no evidence of trauma or bleeding noted. The patient was placed in steep Trendelenburg. The above-dictated findings visualized. The left fallopian tube was identified, and followed to its fimbriated end. The tube was excised by coagulated excision at the mesosalpinx border from the fimbria to the cornua. The tube was then transected at the level of cornua with coagulation and excision. The tube was removed through the assist port. Attention was then turned to the right fallopian tube, which was identified and followed to its fimbriated end. The tube was excised with coagulation and excision from the fimbria to the cornua. The tube was then transected at the level of cornua with coagulation and excision. The tube was removed through the assist port. At this point, the procedure was deemed to be complete. The instruments were removed. The CO2 pneumoperitoneum was released as the trocars were removed under direct visualization. The skin incisions were closed with 4-0 Vicryl in a subcuticular fashion. The uterine manipulator was removed and the Godoy catheter was removed. The patient tolerated the procedure well. She was extubated, awoken, and transferred to the recovery room in good condition.     Dictated By Shellie Atkins MD  d: 05/25/2022 11:06:17  t: 05/25/2022 17:26:06  [facility] [id number]  JJF/    cc: Shellie Atkins MD

## 2022-05-31 ENCOUNTER — OFFICE VISIT (OUTPATIENT)
Dept: INTERNAL MEDICINE CLINIC | Facility: CLINIC | Age: 32
End: 2022-05-31
Payer: COMMERCIAL

## 2022-05-31 VITALS
HEART RATE: 78 BPM | HEIGHT: 60 IN | SYSTOLIC BLOOD PRESSURE: 114 MMHG | WEIGHT: 210 LBS | TEMPERATURE: 98 F | BODY MASS INDEX: 41.23 KG/M2 | OXYGEN SATURATION: 98 % | DIASTOLIC BLOOD PRESSURE: 70 MMHG

## 2022-05-31 DIAGNOSIS — E03.8 OTHER SPECIFIED HYPOTHYROIDISM: ICD-10-CM

## 2022-05-31 LAB
ALBUMIN SERPL-MCNC: 3.7 G/DL (ref 3.4–5)
ALBUMIN/GLOB SERPL: 0.9 {RATIO} (ref 1–2)
ALP LIVER SERPL-CCNC: 127 U/L
ALT SERPL-CCNC: 115 U/L
ANION GAP SERPL CALC-SCNC: 6 MMOL/L (ref 0–18)
AST SERPL-CCNC: 40 U/L (ref 15–37)
BASOPHILS # BLD AUTO: 0.04 X10(3) UL (ref 0–0.2)
BASOPHILS NFR BLD AUTO: 0.5 %
BILIRUB SERPL-MCNC: 0.2 MG/DL (ref 0.1–2)
BUN BLD-MCNC: 16 MG/DL (ref 7–18)
BUN/CREAT SERPL: 23.2 (ref 10–20)
CALCIUM BLD-MCNC: 9.3 MG/DL (ref 8.5–10.1)
CHLORIDE SERPL-SCNC: 108 MMOL/L (ref 98–112)
CO2 SERPL-SCNC: 28 MMOL/L (ref 21–32)
CREAT BLD-MCNC: 0.69 MG/DL
DEPRECATED RDW RBC AUTO: 51.4 FL (ref 35.1–46.3)
EOSINOPHIL # BLD AUTO: 0.23 X10(3) UL (ref 0–0.7)
EOSINOPHIL NFR BLD AUTO: 2.9 %
ERYTHROCYTE [DISTWIDTH] IN BLOOD BY AUTOMATED COUNT: 15.1 % (ref 11–15)
EST. AVERAGE GLUCOSE BLD GHB EST-MCNC: 114 MG/DL (ref 68–126)
FASTING STATUS PATIENT QL REPORTED: YES
GLOBULIN PLAS-MCNC: 4.1 G/DL (ref 2.8–4.4)
GLUCOSE BLD-MCNC: 91 MG/DL (ref 70–99)
HBA1C MFR BLD: 5.6 % (ref ?–5.7)
HCT VFR BLD AUTO: 41.2 %
HGB BLD-MCNC: 12.6 G/DL
IMM GRANULOCYTES # BLD AUTO: 0.04 X10(3) UL (ref 0–1)
IMM GRANULOCYTES NFR BLD: 0.5 %
LYMPHOCYTES # BLD AUTO: 2.32 X10(3) UL (ref 1–4)
LYMPHOCYTES NFR BLD AUTO: 28.9 %
MCH RBC QN AUTO: 28.1 PG (ref 26–34)
MCHC RBC AUTO-ENTMCNC: 30.6 G/DL (ref 31–37)
MCV RBC AUTO: 92 FL
MONOCYTES # BLD AUTO: 0.38 X10(3) UL (ref 0.1–1)
MONOCYTES NFR BLD AUTO: 4.7 %
NEUTROPHILS # BLD AUTO: 5.02 X10 (3) UL (ref 1.5–7.7)
NEUTROPHILS # BLD AUTO: 5.02 X10(3) UL (ref 1.5–7.7)
NEUTROPHILS NFR BLD AUTO: 62.5 %
OSMOLALITY SERPL CALC.SUM OF ELEC: 295 MOSM/KG (ref 275–295)
PLATELET # BLD AUTO: 260 10(3)UL (ref 150–450)
POTASSIUM SERPL-SCNC: 4.4 MMOL/L (ref 3.5–5.1)
PROT SERPL-MCNC: 7.8 G/DL (ref 6.4–8.2)
RBC # BLD AUTO: 4.48 X10(6)UL
SODIUM SERPL-SCNC: 142 MMOL/L (ref 136–145)
TSI SER-ACNC: 1.13 MIU/ML (ref 0.36–3.74)
WBC # BLD AUTO: 8 X10(3) UL (ref 4–11)

## 2022-05-31 PROCEDURE — 3078F DIAST BP <80 MM HG: CPT | Performed by: FAMILY MEDICINE

## 2022-05-31 PROCEDURE — 80050 GENERAL HEALTH PANEL: CPT | Performed by: FAMILY MEDICINE

## 2022-05-31 PROCEDURE — 3008F BODY MASS INDEX DOCD: CPT | Performed by: FAMILY MEDICINE

## 2022-05-31 PROCEDURE — 99214 OFFICE O/P EST MOD 30 MIN: CPT | Performed by: FAMILY MEDICINE

## 2022-05-31 PROCEDURE — 83036 HEMOGLOBIN GLYCOSYLATED A1C: CPT | Performed by: FAMILY MEDICINE

## 2022-05-31 PROCEDURE — 36415 COLL VENOUS BLD VENIPUNCTURE: CPT | Performed by: FAMILY MEDICINE

## 2022-05-31 PROCEDURE — 3074F SYST BP LT 130 MM HG: CPT | Performed by: FAMILY MEDICINE

## 2022-06-01 DIAGNOSIS — E03.8 OTHER SPECIFIED HYPOTHYROIDISM: Primary | ICD-10-CM

## 2022-06-01 DIAGNOSIS — R74.01 TRANSAMINITIS: ICD-10-CM

## 2022-06-07 ENCOUNTER — TELEPHONE (OUTPATIENT)
Dept: INTERNAL MEDICINE CLINIC | Facility: CLINIC | Age: 32
End: 2022-06-07

## 2022-06-07 NOTE — TELEPHONE ENCOUNTER
----- Message from Jacquelyn Horta MD sent at 6/1/2022  1:19 PM CDT -----  Please let her know:  1) DM negative  2) Thyroid controlled w/o medication thus far. Ok to continue being off levothyroxine. Recheck in 2 months (essentially at the next appt for her baby Lazara Garcia) to assure ok without medicine for longer term  3) Your liver enzymes are mildly elevated. This often fluctuates. Decrease possible triggers such as tylenol/cholesterol/weight/alcohol. In 2 months, we need to repeat it.   4) blood count is improving postpartum  5) increase hydration

## 2022-06-13 NOTE — PROGRESS NOTES
Discharged to home per ambulatory in stable condition with written and verbal instructions. Patient verbalizes understanding of information given.
Pt is a 26year old female admitted to [facility]. Patient presents with:  R/o  Labor: c/o suprapubic pain/cramping and noted small amount of blood in underwear   States pain occurs when walking  Pt is  26w0d intra-uterine pregnancy.   Histor
Yes

## 2022-08-16 ENCOUNTER — HOSPITAL ENCOUNTER (OUTPATIENT)
Dept: GENERAL RADIOLOGY | Facility: HOSPITAL | Age: 32
Discharge: HOME | End: 2022-08-16
Attending: FAMILY MEDICINE
Payer: COMMERCIAL

## 2022-08-16 ENCOUNTER — OFFICE VISIT (OUTPATIENT)
Dept: INTERNAL MEDICINE CLINIC | Facility: CLINIC | Age: 32
End: 2022-08-16
Payer: COMMERCIAL

## 2022-08-16 VITALS
HEIGHT: 60 IN | TEMPERATURE: 98 F | WEIGHT: 211 LBS | SYSTOLIC BLOOD PRESSURE: 110 MMHG | HEART RATE: 88 BPM | DIASTOLIC BLOOD PRESSURE: 70 MMHG | BODY MASS INDEX: 41.43 KG/M2 | OXYGEN SATURATION: 99 %

## 2022-08-16 DIAGNOSIS — M72.2 BILATERAL PLANTAR FASCIITIS: Primary | ICD-10-CM

## 2022-08-16 DIAGNOSIS — M72.2 PLANTAR FASCIITIS, BILATERAL: ICD-10-CM

## 2022-08-16 DIAGNOSIS — M72.2 BILATERAL PLANTAR FASCIITIS: ICD-10-CM

## 2022-08-16 PROCEDURE — 3008F BODY MASS INDEX DOCD: CPT | Performed by: FAMILY MEDICINE

## 2022-08-16 PROCEDURE — 73630 X-RAY EXAM OF FOOT: CPT | Performed by: FAMILY MEDICINE

## 2022-08-16 PROCEDURE — 99214 OFFICE O/P EST MOD 30 MIN: CPT | Performed by: FAMILY MEDICINE

## 2022-08-16 PROCEDURE — 3078F DIAST BP <80 MM HG: CPT | Performed by: FAMILY MEDICINE

## 2022-08-16 PROCEDURE — 3074F SYST BP LT 130 MM HG: CPT | Performed by: FAMILY MEDICINE

## 2022-08-16 RX ORDER — CELECOXIB 200 MG/1
200 CAPSULE ORAL DAILY PRN
Qty: 30 CAPSULE | Refills: 2 | Status: SHIPPED | OUTPATIENT
Start: 2022-08-16

## 2022-09-04 ENCOUNTER — LAB ENCOUNTER (OUTPATIENT)
Dept: LAB | Facility: REFERENCE LAB | Age: 32
End: 2022-09-04
Attending: FAMILY MEDICINE
Payer: COMMERCIAL

## 2022-09-04 DIAGNOSIS — E03.8 OTHER SPECIFIED HYPOTHYROIDISM: ICD-10-CM

## 2022-09-04 DIAGNOSIS — R74.01 TRANSAMINITIS: ICD-10-CM

## 2022-09-04 LAB
ALBUMIN SERPL-MCNC: 3.4 G/DL (ref 3.4–5)
ALBUMIN/GLOB SERPL: 0.9 {RATIO} (ref 1–2)
ALP LIVER SERPL-CCNC: 81 U/L
ALT SERPL-CCNC: 29 U/L
ANION GAP SERPL CALC-SCNC: 7 MMOL/L (ref 0–18)
AST SERPL-CCNC: 23 U/L (ref 15–37)
BILIRUB SERPL-MCNC: 0.3 MG/DL (ref 0.1–2)
BUN BLD-MCNC: 11 MG/DL (ref 7–18)
BUN/CREAT SERPL: 19.3 (ref 10–20)
CALCIUM BLD-MCNC: 8.6 MG/DL (ref 8.5–10.1)
CHLORIDE SERPL-SCNC: 109 MMOL/L (ref 98–112)
CO2 SERPL-SCNC: 23 MMOL/L (ref 21–32)
CREAT BLD-MCNC: 0.57 MG/DL
FASTING STATUS PATIENT QL REPORTED: YES
GFR SERPLBLD BASED ON 1.73 SQ M-ARVRAT: 124 ML/MIN/1.73M2 (ref 60–?)
GLOBULIN PLAS-MCNC: 3.9 G/DL (ref 2.8–4.4)
GLUCOSE BLD-MCNC: 89 MG/DL (ref 70–99)
OSMOLALITY SERPL CALC.SUM OF ELEC: 287 MOSM/KG (ref 275–295)
POTASSIUM SERPL-SCNC: 4.3 MMOL/L (ref 3.5–5.1)
PROT SERPL-MCNC: 7.3 G/DL (ref 6.4–8.2)
SODIUM SERPL-SCNC: 139 MMOL/L (ref 136–145)
TSI SER-ACNC: 2.36 MIU/ML (ref 0.36–3.74)

## 2022-09-04 PROCEDURE — 36415 COLL VENOUS BLD VENIPUNCTURE: CPT

## 2022-09-04 PROCEDURE — 80053 COMPREHEN METABOLIC PANEL: CPT

## 2022-09-04 PROCEDURE — 84443 ASSAY THYROID STIM HORMONE: CPT

## 2022-09-06 ENCOUNTER — OFFICE VISIT (OUTPATIENT)
Dept: PODIATRY CLINIC | Facility: CLINIC | Age: 32
End: 2022-09-06
Payer: COMMERCIAL

## 2022-09-06 VITALS — SYSTOLIC BLOOD PRESSURE: 121 MMHG | HEART RATE: 81 BPM | DIASTOLIC BLOOD PRESSURE: 76 MMHG

## 2022-09-06 DIAGNOSIS — M72.2 PLANTAR FASCIITIS, BILATERAL: Primary | ICD-10-CM

## 2022-09-06 DIAGNOSIS — M79.672 HEEL PAIN, BILATERAL: ICD-10-CM

## 2022-09-06 DIAGNOSIS — M21.6X1 EQUINUS DEFORMITY OF BOTH FEET: ICD-10-CM

## 2022-09-06 DIAGNOSIS — M79.671 HEEL PAIN, BILATERAL: ICD-10-CM

## 2022-09-06 DIAGNOSIS — M21.6X2 EQUINUS DEFORMITY OF BOTH FEET: ICD-10-CM

## 2022-09-06 PROCEDURE — 3078F DIAST BP <80 MM HG: CPT | Performed by: PODIATRIST

## 2022-09-06 PROCEDURE — 20550 NJX 1 TENDON SHEATH/LIGAMENT: CPT | Performed by: PODIATRIST

## 2022-09-06 PROCEDURE — 99203 OFFICE O/P NEW LOW 30 MIN: CPT | Performed by: PODIATRIST

## 2022-09-06 PROCEDURE — 3074F SYST BP LT 130 MM HG: CPT | Performed by: PODIATRIST

## 2022-09-06 RX ORDER — TRIAMCINOLONE ACETONIDE 40 MG/ML
40 INJECTION, SUSPENSION INTRA-ARTICULAR; INTRAMUSCULAR ONCE
Status: COMPLETED | OUTPATIENT
Start: 2022-09-06 | End: 2022-09-06

## 2022-09-06 RX ADMIN — TRIAMCINOLONE ACETONIDE 40 MG: 40 INJECTION, SUSPENSION INTRA-ARTICULAR; INTRAMUSCULAR at 18:23:00

## 2022-09-06 NOTE — PROGRESS NOTES
Per verbal order from Dr. Hakeem Wheatley, draw up 1ml of 0.5% Marcaine and 1ml of Kenalog 40 for cortisone injection to bilateral heels. Chasidy Tyson  Patient provided education handout for cortisone injection.

## 2022-10-20 ENCOUNTER — TELEPHONE (OUTPATIENT)
Dept: INTERNAL MEDICINE CLINIC | Facility: CLINIC | Age: 32
End: 2022-10-20

## 2022-10-20 DIAGNOSIS — R30.0 DYSURIA: Primary | ICD-10-CM

## 2022-10-20 NOTE — TELEPHONE ENCOUNTER
Spoke to patient she will go to the lab to have urine sample collected she is aware that we will call her once we get results

## 2022-10-24 ENCOUNTER — LAB ENCOUNTER (OUTPATIENT)
Dept: LAB | Facility: HOSPITAL | Age: 32
End: 2022-10-24
Attending: FAMILY MEDICINE
Payer: COMMERCIAL

## 2022-10-24 DIAGNOSIS — R30.0 DYSURIA: ICD-10-CM

## 2022-10-24 LAB
BILIRUB UR QL: NEGATIVE
COLOR UR: YELLOW
GLUCOSE UR-MCNC: NEGATIVE MG/DL
KETONES UR-MCNC: NEGATIVE MG/DL
NITRITE UR QL STRIP.AUTO: NEGATIVE
PH UR: 6 [PH] (ref 5–8)
PROT UR-MCNC: NEGATIVE MG/DL
SP GR UR STRIP: 1.02 (ref 1–1.03)
UROBILINOGEN UR STRIP-ACNC: <2
VIT C UR-MCNC: NEGATIVE MG/DL

## 2022-10-24 PROCEDURE — 81001 URINALYSIS AUTO W/SCOPE: CPT

## 2022-10-24 PROCEDURE — 87086 URINE CULTURE/COLONY COUNT: CPT

## 2022-11-11 ENCOUNTER — TELEPHONE (OUTPATIENT)
Dept: INTERNAL MEDICINE CLINIC | Facility: CLINIC | Age: 32
End: 2022-11-11

## 2022-11-11 RX ORDER — ONDANSETRON 4 MG/1
4 TABLET, FILM COATED ORAL EVERY 8 HOURS PRN
Qty: 20 TABLET | Refills: 0 | Status: SHIPPED | OUTPATIENT
Start: 2022-11-11

## 2022-11-11 NOTE — TELEPHONE ENCOUNTER
Spoke to patient informed her since symptoms just started last night supportive care for now, brat diet she is aware she must avoid greasy foods, no dairy, push fluids if she develops a fever she may take tylenol/ibuprofen, zofran was sent to pharmacy to help with nausea and vomiting, if symptoms worsen and she has no improvement she is to go to urgent care, she is also aware that this is contagious so she will take precautions given that she has children at home

## 2022-11-11 NOTE — TELEPHONE ENCOUNTER
Pt is calling stating that started with vomiting, diarrhea and stomach pain since last night. Pt wants to be seen by doctor.       Please call and advise

## 2022-12-05 ENCOUNTER — HOSPITAL ENCOUNTER (OUTPATIENT)
Age: 32
Discharge: HOME | End: 2022-12-05
Payer: COMMERCIAL

## 2022-12-05 ENCOUNTER — APPOINTMENT (OUTPATIENT)
Dept: CT IMAGING | Age: 32
End: 2022-12-05
Attending: NURSE PRACTITIONER
Payer: COMMERCIAL

## 2022-12-05 ENCOUNTER — TELEPHONE (OUTPATIENT)
Dept: INTERNAL MEDICINE CLINIC | Facility: CLINIC | Age: 32
End: 2022-12-05

## 2022-12-05 VITALS
OXYGEN SATURATION: 100 % | HEART RATE: 98 BPM | RESPIRATION RATE: 16 BRPM | DIASTOLIC BLOOD PRESSURE: 85 MMHG | SYSTOLIC BLOOD PRESSURE: 112 MMHG | TEMPERATURE: 97 F

## 2022-12-05 DIAGNOSIS — N30.90 CYSTITIS: Primary | ICD-10-CM

## 2022-12-05 LAB
#MXD IC: 0.6 X10ˆ3/UL (ref 0.1–1)
B-HCG UR QL: NEGATIVE
BILIRUB UR QL STRIP: NEGATIVE
BUN BLD-MCNC: 9 MG/DL (ref 7–18)
CHLORIDE BLD-SCNC: 105 MMOL/L (ref 98–112)
CLARITY UR: CLEAR
CO2 BLD-SCNC: 25 MMOL/L (ref 21–32)
COLOR UR: YELLOW
CREAT BLD-MCNC: 0.5 MG/DL
GFR SERPLBLD BASED ON 1.73 SQ M-ARVRAT: 127 ML/MIN/1.73M2 (ref 60–?)
GLUCOSE BLD-MCNC: 93 MG/DL (ref 70–99)
GLUCOSE UR STRIP-MCNC: NEGATIVE MG/DL
HCT VFR BLD AUTO: 42.4 %
HCT VFR BLD CALC: 47 %
HGB BLD-MCNC: 13.6 G/DL
HGB UR QL STRIP: NEGATIVE
ISTAT IONIZED CALCIUM FOR CHEM 8: 1.19 MMOL/L (ref 1.12–1.32)
KETONES UR STRIP-MCNC: NEGATIVE MG/DL
LYMPHOCYTES # BLD AUTO: 2.8 X10ˆ3/UL (ref 1–4)
LYMPHOCYTES NFR BLD AUTO: 23.4 %
MCH RBC QN AUTO: 28.9 PG (ref 26–34)
MCHC RBC AUTO-ENTMCNC: 32.1 G/DL (ref 31–37)
MCV RBC AUTO: 90.2 FL (ref 80–100)
MIXED CELL %: 4.7 %
NEUTROPHILS # BLD AUTO: 8.6 X10ˆ3/UL (ref 1.5–7.7)
NEUTROPHILS NFR BLD AUTO: 71.9 %
NITRITE UR QL STRIP: NEGATIVE
PH UR STRIP: 6.5 [PH]
PLATELET # BLD AUTO: 280 X10ˆ3/UL (ref 150–450)
POTASSIUM BLD-SCNC: 3.8 MMOL/L (ref 3.6–5.1)
PROT UR STRIP-MCNC: NEGATIVE MG/DL
RBC # BLD AUTO: 4.7 X10ˆ6/UL
SODIUM BLD-SCNC: 140 MMOL/L (ref 136–145)
SP GR UR STRIP: 1.01
UROBILINOGEN UR STRIP-ACNC: <2 MG/DL
WBC # BLD AUTO: 12 X10ˆ3/UL (ref 4–11)

## 2022-12-05 PROCEDURE — 81002 URINALYSIS NONAUTO W/O SCOPE: CPT | Performed by: NURSE PRACTITIONER

## 2022-12-05 PROCEDURE — 99214 OFFICE O/P EST MOD 30 MIN: CPT | Performed by: NURSE PRACTITIONER

## 2022-12-05 PROCEDURE — 74176 CT ABD & PELVIS W/O CONTRAST: CPT | Performed by: NURSE PRACTITIONER

## 2022-12-05 PROCEDURE — 87086 URINE CULTURE/COLONY COUNT: CPT | Performed by: NURSE PRACTITIONER

## 2022-12-05 PROCEDURE — 81025 URINE PREGNANCY TEST: CPT | Performed by: NURSE PRACTITIONER

## 2022-12-05 PROCEDURE — 85025 COMPLETE CBC W/AUTO DIFF WBC: CPT | Performed by: NURSE PRACTITIONER

## 2022-12-05 PROCEDURE — 80047 BASIC METABLC PNL IONIZED CA: CPT | Performed by: NURSE PRACTITIONER

## 2022-12-05 RX ORDER — CEFADROXIL 500 MG/1
500 CAPSULE ORAL 2 TIMES DAILY
Qty: 14 CAPSULE | Refills: 0 | Status: SHIPPED | OUTPATIENT
Start: 2022-12-05 | End: 2022-12-12

## 2022-12-05 NOTE — TELEPHONE ENCOUNTER
Returned call to patient messaging re: right-sided abd pain for few days unrelieved by ibuprofen. Asking for either same day appt or order to have kidney ultrasound/ imaging as she believes she may have a kidney stone and states she has had kidney stones in the past w/ similar type pf pain. No SDA Appts w/ Dr Aleshia Cortés available until 12/14. Returned call to patient via [address]  (#[phone]). Unable to reach patient. Left vmail message for patient to seek urgent care clinic visit today for evaluation and US if provider believes warranted after exam, likely closest clinics [facility] [address] or [address]. Clinic location addresses and hrs provided.

## 2022-12-05 NOTE — ED QUICK NOTES
Pt ambulatory from  to [address] [place] [address] for CT via PV. Pt given directions and verbalized understanding.

## 2022-12-05 NOTE — TELEPHONE ENCOUNTER
Patient is calling asking for an appointment or orders for xray or ultrasound for her kidney. She states for the past two days she has been having right back pain where her kidney is located. She states she has had kidney stones in the past where she had to get them removed. Patient believes she has them again since she is experiencing same pain as before. She states she is taking ibuprofen for the pain but its not working. Please advice.

## 2022-12-05 NOTE — ED INITIAL ASSESSMENT (HPI)
Pt here w c/o R flank pain x 3 days, states pain worse with movement but concerned for kidney stone as she has has hx of it. States she has urinary frequency/urgency and urinating small amounts of urine only. Denies hematuria, no N/V. No fever.

## 2022-12-06 ENCOUNTER — TELEPHONE (OUTPATIENT)
Dept: INTERNAL MEDICINE CLINIC | Facility: CLINIC | Age: 32
End: 2022-12-06

## 2022-12-06 DIAGNOSIS — N30.90 CYSTITIS: Primary | ICD-10-CM

## 2022-12-06 DIAGNOSIS — R30.0 SCALDING PAIN ON URINATION: ICD-10-CM

## 2022-12-06 RX ORDER — PHENAZOPYRIDINE HYDROCHLORIDE 200 MG/1
200 TABLET, FILM COATED ORAL 3 TIMES DAILY PRN
Qty: 6 TABLET | Refills: 0 | Status: SHIPPED | OUTPATIENT
Start: 2022-12-06

## 2022-12-06 NOTE — TELEPHONE ENCOUNTER
Please let her know that  pain likely from cystitis. We can send pyridium 200mg TID x3d  (total of 6 tablets) which will help with bladder pain but also let her know that it can make her urine orange. If she has no improvement with her pain or still having urinary symtoms after pyridium and finishing Abx then we will double book her in. For now she just needs to give time for the Abx to work as she been on it for <24hrs  As for the renal stones, they are not obstructive. The left is bigger than the right and her pain is on the right. If she has continued pain we need to send her to urology for the stones.

## 2022-12-06 NOTE — DISCHARGE INSTRUCTIONS
Push fluids. Take antibiotics as prescribed. Urine culture is pending. Ibuprofen as needed for pain. Follow-up with your doctor. If you develop any vomiting, worsening symptoms, or any other concerns, go to the nearest emergency department for further evaluation.

## 2022-12-06 NOTE — TELEPHONE ENCOUNTER
Patient is requesting pcp to review UC lab work patient states she has kidney stone but she's in pain. Patient would like an appt or talk to pcp today. Thai speaking.

## 2022-12-06 NOTE — TELEPHONE ENCOUNTER
Prolonged discussion w/ patient vias  (#[id number]) discussing CT results and patient's concern for sizes of renal calculi quoted to her, treatment required for these calculi beyond the treatment for the cystitis/ UTI (Duricef antibx). Advised patient on nature of renal calculi - development, obstructive vs non-obstructive sizes/ findings on imaging and possible future Urology consult/treatment ranging from dietary changes, medication and more invasive Pxs to dissolve stones (lithotripsy) in extreme cases. Reassured that current findings do not demonstrate an extreme case/no obstructive stones requiring a procedure. Advised pt that urine C&S not yet finalized. In the event the cultures indicate a different antibiotic is required, we will call and let her know/ change medications at that time. If we do not call her, then the C&S results indicate the current antibx are appropriate for bacteria cultured. Patient verbalizes desire to have the pyridium Rx offered for urinary tract pain. Rx sent to [facility]. Pt verbalizes understanding and will complete oral antibx as instructed and call the office when antibx are finished to report how she is feeling, or should symptoms worsen she will return to urgent care clinic/ ED and inform office.

## 2023-09-15 ENCOUNTER — APPOINTMENT (OUTPATIENT)
Dept: GENERAL RADIOLOGY | Age: 33
End: 2023-09-15
Attending: PHYSICIAN ASSISTANT
Payer: COMMERCIAL

## 2023-09-15 ENCOUNTER — HOSPITAL ENCOUNTER (OUTPATIENT)
Age: 33
Discharge: HOME | End: 2023-09-15
Payer: COMMERCIAL

## 2023-09-15 VITALS
RESPIRATION RATE: 16 BRPM | TEMPERATURE: 97 F | DIASTOLIC BLOOD PRESSURE: 85 MMHG | OXYGEN SATURATION: 100 % | SYSTOLIC BLOOD PRESSURE: 130 MMHG | HEART RATE: 75 BPM

## 2023-09-15 DIAGNOSIS — Z20.822 ENCOUNTER FOR LABORATORY TESTING FOR COVID-19 VIRUS: ICD-10-CM

## 2023-09-15 DIAGNOSIS — R05.1 ACUTE COUGH: Primary | ICD-10-CM

## 2023-09-15 LAB — SARS-COV-2 RNA RESP QL NAA+PROBE: NOT DETECTED

## 2023-09-15 PROCEDURE — 99204 OFFICE O/P NEW MOD 45 MIN: CPT | Performed by: PHYSICIAN ASSISTANT

## 2023-09-15 PROCEDURE — U0002 COVID-19 LAB TEST NON-CDC: HCPCS | Performed by: PHYSICIAN ASSISTANT

## 2023-09-15 PROCEDURE — 71046 X-RAY EXAM CHEST 2 VIEWS: CPT | Performed by: PHYSICIAN ASSISTANT

## 2023-09-15 RX ORDER — CODEINE PHOSPHATE AND GUAIFENESIN 10; 100 MG/5ML; MG/5ML
5 SOLUTION ORAL EVERY 6 HOURS PRN
Qty: 50 ML | Refills: 0 | Status: SHIPPED | OUTPATIENT
Start: 2023-09-15

## 2023-09-15 RX ORDER — PREDNISONE 20 MG/1
40 TABLET ORAL DAILY
Qty: 10 TABLET | Refills: 0 | Status: SHIPPED | OUTPATIENT
Start: 2023-09-15 | End: 2023-09-20

## 2023-09-15 RX ORDER — ALBUTEROL SULFATE 90 UG/1
2 AEROSOL, METERED RESPIRATORY (INHALATION) EVERY 4 HOURS PRN
Qty: 1 EACH | Refills: 0 | Status: SHIPPED | OUTPATIENT
Start: 2023-09-15 | End: 2023-10-15

## 2023-09-15 RX ORDER — BENZONATATE 100 MG/1
100 CAPSULE ORAL 3 TIMES DAILY PRN
Qty: 30 CAPSULE | Refills: 0 | Status: SHIPPED | OUTPATIENT
Start: 2023-09-15 | End: 2023-10-15

## 2023-12-17 NOTE — ADDENDUM NOTE
Encounter addended by: No Hay RN on: 3/15/2022 12:48 PM   Actions taken: Flowsheet accepted
Encounter addended by: Viral Redd MD on: 3/15/2022 1:53 PM   Actions taken: Clinical Note Signed, Charge Capture section accepted
2 seconds or less

## 2025-01-31 ENCOUNTER — HOSPITAL ENCOUNTER (OUTPATIENT)
Dept: CT IMAGING | Facility: HOSPITAL | Age: 35
Discharge: HOME | End: 2025-01-31
Attending: FAMILY MEDICINE
Payer: COMMERCIAL

## 2025-01-31 DIAGNOSIS — N20.0 KIDNEY STONES: ICD-10-CM

## 2025-01-31 PROCEDURE — 74176 CT ABD & PELVIS W/O CONTRAST: CPT | Performed by: FAMILY MEDICINE

## 2025-02-25 ENCOUNTER — TELEPHONE (OUTPATIENT)
Dept: SURGERY | Facility: CLINIC | Age: 35
End: 2025-02-25

## 2025-02-26 ENCOUNTER — OFFICE VISIT (OUTPATIENT)
Dept: SURGERY | Facility: CLINIC | Age: 35
End: 2025-02-26

## 2025-02-26 ENCOUNTER — TELEPHONE (OUTPATIENT)
Dept: SURGERY | Facility: CLINIC | Age: 35
End: 2025-02-26

## 2025-02-26 VITALS
DIASTOLIC BLOOD PRESSURE: 85 MMHG | SYSTOLIC BLOOD PRESSURE: 148 MMHG | HEART RATE: 93 BPM | HEIGHT: 60 IN | BODY MASS INDEX: 35.14 KG/M2 | WEIGHT: 179 LBS

## 2025-02-26 DIAGNOSIS — R82.90 ABNORMAL URINE FINDINGS: ICD-10-CM

## 2025-02-26 DIAGNOSIS — N20.0 STAGHORN RENAL CALCULUS: ICD-10-CM

## 2025-02-26 DIAGNOSIS — N20.0 KIDNEY STONE ON LEFT SIDE: ICD-10-CM

## 2025-02-26 DIAGNOSIS — N20.0 KIDNEY STONE ON LEFT SIDE: Primary | ICD-10-CM

## 2025-02-26 DIAGNOSIS — Z79.01 MONITORING FOR ANTICOAGULANT USE: ICD-10-CM

## 2025-02-26 DIAGNOSIS — Z51.81 MONITORING FOR ANTICOAGULANT USE: ICD-10-CM

## 2025-02-26 DIAGNOSIS — Z01.818 PREOPERATIVE TESTING: ICD-10-CM

## 2025-02-26 DIAGNOSIS — Z01.83 ENCOUNTER FOR BLOOD TYPING: ICD-10-CM

## 2025-02-26 DIAGNOSIS — N20.0 STAGHORN RENAL CALCULUS: Primary | ICD-10-CM

## 2025-02-26 PROCEDURE — 3008F BODY MASS INDEX DOCD: CPT | Performed by: UROLOGY

## 2025-02-26 PROCEDURE — 99204 OFFICE O/P NEW MOD 45 MIN: CPT | Performed by: UROLOGY

## 2025-02-26 PROCEDURE — 3077F SYST BP >= 140 MM HG: CPT | Performed by: UROLOGY

## 2025-02-26 PROCEDURE — 3079F DIAST BP 80-89 MM HG: CPT | Performed by: UROLOGY

## 2025-02-26 NOTE — H&P
[facility]  Initial Office Consultation    HPI:   Karina Gil is a 34 year old female here today for consultation at the request of, and a copy of this note will be sent to, YASEMIN PEREZ MD.    1.  Nephrolithiasis  Patient with previous history of nephrolithiasis back in 2015.  No clear records available however it seems like she previously underwent ureteral stenting by ureteroscopy with laser lithotripsy.    Recently seen by PCP January 2025.  She was complaining of vague bilateral back pain/flank pain.  She also had urinary frequency, dysuria.  No fevers or chills.    Dipstick UA showed trace leuks, large blood.  No culture sent.  She was treated with Cipro 500 mg twice daily for 7 days.  Symptoms improved.    She also had a CT abdomen and pelvis without contrast completed 1/31/2025.  This revealed a 20 x 13 mm staghorn calculus in the upper pole of the left kidney.  6 mm nonobstructing right lower pole kidney stone.  3 mm right kidney midpole stone.  Submillimeter stone left kidney.  No hydronephrosis.    Referred for further evaluation and management.    PAST MEDICAL HISTORY: Nephrolithiasis.  Obesity.  Subclinical hypothyroidism.  Hyperlipidemia.    PAST SURGICAL HISTORY: Laparoscopic tubal ligation.  Left ureteroscopy with laser lithotripsy.    SOCIAL HISTORY:  and has 3 children.  No smoking or illicit drug use.  No alcohol.  Works as a .     Family History   Problem Relation Age of Onset    Diabetes Father     No Known Problems Mother     No Known Problems Daughter     No Known Problems Sister     Other (renal calculi) Brother     No Known Problems Brother      Allergies: Patient has no known allergies.      REVIEW OF SYSTEMS:  Pertinent positives and negatives per HPI. A 12-point ROS was performed and is otherwise negative.       EXAM:  /85 (BP Location: Left arm, Patient Position: Sitting, Cuff Size: adult)   Pulse 93   Ht 5' (1.524 m)   Wt  179 lb (81.2 kg)   BMI 34.96 kg/m²     Physical Exam  Constitutional:       General: She is not in acute distress.     Appearance: She is well-developed.   HENT:      Head: Normocephalic.   Eyes:      General: No scleral icterus.  Cardiovascular:      Rate and Rhythm: Normal rate.   Pulmonary:      Effort: Pulmonary effort is normal.   Abdominal:      Tenderness: There is no right CVA tenderness or left CVA tenderness.   Skin:     General: Skin is warm and dry.   Neurological:      Mental Status: She is alert and oriented to person, place, and time.   Psychiatric:         Mood and Affect: Mood normal.         Behavior: Behavior normal.       LABS:  See HPI.      IMAGING:    CT ABDOMEN+PELVIS KIDNEYSTONE (1/31/25) which I personally reviewed.  To my interpretation and measurement, her left upper pole staghorn kidney stone measures about 3 cm wide.  Final radiology report revealing:    KIDNEYS: 20 x 13 mm staghorn calculus in the upper pole of the left kidney (previously 25 x 19 mm).  6 mm nonobstructing calculus in a lower pole calyx of the right kidney.  At least 1 additional smaller calculus is present each kidney, with a right midpole 3 mm stone (previously less than 1 mm in diameter on 12/5/22), and stable punctate submillimeter stone in the left kidney.  Bilaterally no ureteral stone or hydroureteronephrosis.  No bladder stones.     CONCLUSION: Bilateral nonobstructing calculi, which includes a staghorn calculus in the upper pole of the left kidney.  Staghorn calculus is marginally smaller since 12/5/22 CT.  Slight increase in size of right mid pole renal stone since 12/5/22.  No change in overall quantity of intrarenal stones.  No obstructive uropathy.  No ureteral or bladder stone.               IMPRESSION:  34 year old female with bilateral nephrolithiasis.  Index stone includes a 3 x 2.1 cm right upper pole staghorn configuration kidney stone.    Different stone management options were discussed including  watchful waiting, ESWL with pre-stenting, ureteroscopy with laser lithotripsy, and percutaneous nephrolithotomy.       Rationale and approach as well as benefits, risks, possible complications and reasonable alternatives of each treatment were discussed with the patient.  Stone clearance rates were discussed as well as the expected postoperative course of recovery.     We discussed the eventual need for stent removal which is usually performed in the office setting.     Given her stone size, she would be a good candidate for percutaneous nephrolithotomy.  I think that her stone is too big to be effectively managed with ESWL.  If she elects ureteroscopy with laser lithotripsy, then she might require a staged approach    We discussed risks of surgery including but not limited to medical and anesthetic complications, bleeding potentially requiring transfusion, infection, damage to surrounding organs or structures, ureteral injury, stricture formation, and possible need for additional procedures or staged approach.     Patient verbalized understanding. All questions were answered.    She would like to proceed with a left percutaneous nephrolithotomy (PCNL).      PLAN:  1.  Patient will be scheduled for left percutaneous nephrolithotomy (PCNL) with IR access to be obtained the same day.    Routine preop testing.    Mark Santos MD  2/26/2025

## 2025-02-27 NOTE — TELEPHONE ENCOUNTER
Message left for pt to clbk to coordinate IR/PCNL surgery.  Direct ph no# provided.   Preoperative lab orders entered.   Spoke with IR for possible date in March -3/28/25 provided.

## 2025-02-27 NOTE — TELEPHONE ENCOUNTER
This patient needs to be scheduled for a left percutaneous nephrolithotomy.  Access to the left kidney to be obtained by IR first thing in the morning.      Urology Surgery Scheduling Request    Location: [facility] OR    Surgeon: ISAMAR Santos MD    Asst. Surgeon: See if Dr. Kelsey is available on a Tuesday morning.    Diagnosis: Staghorn left kidney stone.    Procedure: Left percutaneous nephrolithotomy.    Procedure CPT Code (if known): 84216    Anesthesia: General     Time Frame: Elective March 2024.    Time needed: 2 hours    Special Equipment: Holmium Laser.  Fluoroscopy C arm.  PCNL shock pulse or Swiss LithoClast lithotripter.    On Call to OR: Ceftriaxone (Rocephin) 1 g IV and gentamicin IV (pharmacy to dose).    Admission: AM Admit    Pre-op Testing: CBC, BMP, PT/INR, PTT, EKG, CXR, Urinalysis, Urine Culture, and Type & Screen     Need Pre-op Clearance: N/A    Estimated Post Op/Follow Up Appt:  JOSESITO Santos MD  2/26/2025

## 2025-02-27 NOTE — TELEPHONE ENCOUNTER
Spoke with pt, scheduled combined IR/PCNL surgery for Tuesday March 18, 2025 with Dr Chang/Low edwards. IR will start at 930a, followed by Urology at 11a    Orders placed - IR and preoperative testing, CBC, BMP, PT/INR, PTT, EKG, CXR, Urinalysis, Urine Culture, and Type & Screen     Pt is aware to complete pre-op testing week of March 3rd.      Surgery information sent to pt thru portal.

## 2025-02-28 NOTE — TELEPHONE ENCOUNTER
Spoke with pt, rescheduled combined IR/PCNL surgery for Tuesday April 8, 2025 with Dr Chang/Low edwards. IR will start at 8:00a, followed by Urology at 10a     Orders placed - IR and preoperative testing, CBC, BMP, PT/INR, PTT, EKG, CXR, Urinalysis, Urine Culture, and Type & Screen      Pt is aware to complete pre-op testing week of April 1st and that she'll be getting a call from the IR department 1-2wks prior to surgery date.       Pt also aware she'll be admitted for overnight observation.      Surgery information sent to pt thru portal.

## 2025-03-26 RX ORDER — PHENTERMINE HYDROCHLORIDE 30 MG/1
30 CAPSULE ORAL EVERY MORNING
COMMUNITY

## 2025-03-29 ENCOUNTER — HOSPITAL ENCOUNTER (OUTPATIENT)
Dept: GENERAL RADIOLOGY | Facility: HOSPITAL | Age: 35
Discharge: HOME | End: 2025-03-29
Attending: UROLOGY
Payer: COMMERCIAL

## 2025-03-29 ENCOUNTER — LAB ENCOUNTER (OUTPATIENT)
Dept: LAB | Facility: HOSPITAL | Age: 35
End: 2025-03-29
Attending: UROLOGY
Payer: COMMERCIAL

## 2025-03-29 ENCOUNTER — EKG ENCOUNTER (OUTPATIENT)
Dept: LAB | Facility: HOSPITAL | Age: 35
End: 2025-03-29
Attending: UROLOGY
Payer: COMMERCIAL

## 2025-03-29 DIAGNOSIS — Z01.818 PREOPERATIVE TESTING: ICD-10-CM

## 2025-03-29 LAB
ANION GAP SERPL CALC-SCNC: 7 MMOL/L (ref 0–18)
ANTIBODY SCREEN: NEGATIVE
APTT PPP: 31.5 SECONDS (ref 23–36)
BASOPHILS # BLD AUTO: 0.03 X10(3) UL (ref 0–0.2)
BASOPHILS NFR BLD AUTO: 0.5 %
BILIRUB UR QL: NEGATIVE
BUN BLD-MCNC: 11 MG/DL (ref 9–23)
BUN/CREAT SERPL: 17.5 (ref 10–20)
CALCIUM BLD-MCNC: 9 MG/DL (ref 8.7–10.4)
CHLORIDE SERPL-SCNC: 106 MMOL/L (ref 98–112)
CLARITY UR: CLEAR
CO2 SERPL-SCNC: 29 MMOL/L (ref 21–32)
CREAT BLD-MCNC: 0.63 MG/DL
DEPRECATED RDW RBC AUTO: 45.3 FL (ref 35.1–46.3)
EGFRCR SERPLBLD CKD-EPI 2021: 119 ML/MIN/1.73M2 (ref 60–?)
EOSINOPHIL # BLD AUTO: 0.21 X10(3) UL (ref 0–0.7)
EOSINOPHIL NFR BLD AUTO: 3.4 %
ERYTHROCYTE [DISTWIDTH] IN BLOOD BY AUTOMATED COUNT: 13.5 % (ref 11–15)
FASTING STATUS PATIENT QL REPORTED: YES
GLUCOSE BLD-MCNC: 88 MG/DL (ref 70–99)
GLUCOSE UR-MCNC: NORMAL MG/DL
HCT VFR BLD AUTO: 37.4 %
HGB BLD-MCNC: 12.1 G/DL
IMM GRANULOCYTES # BLD AUTO: 0.02 X10(3) UL (ref 0–1)
IMM GRANULOCYTES NFR BLD: 0.3 %
INR BLD: 0.93 (ref 0.8–1.2)
KETONES UR-MCNC: NEGATIVE MG/DL
LEUKOCYTE ESTERASE UR QL STRIP.AUTO: 25
LYMPHOCYTES # BLD AUTO: 2.07 X10(3) UL (ref 1–4)
LYMPHOCYTES NFR BLD AUTO: 33.1 %
MCH RBC QN AUTO: 29.4 PG (ref 26–34)
MCHC RBC AUTO-ENTMCNC: 32.4 G/DL (ref 31–37)
MCV RBC AUTO: 90.8 FL
MONOCYTES # BLD AUTO: 0.44 X10(3) UL (ref 0.1–1)
MONOCYTES NFR BLD AUTO: 7 %
NEUTROPHILS # BLD AUTO: 3.49 X10 (3) UL (ref 1.5–7.7)
NEUTROPHILS # BLD AUTO: 3.49 X10(3) UL (ref 1.5–7.7)
NEUTROPHILS NFR BLD AUTO: 55.7 %
NITRITE UR QL STRIP.AUTO: NEGATIVE
OSMOLALITY SERPL CALC.SUM OF ELEC: 293 MOSM/KG (ref 275–295)
PH UR: 6.5 [PH] (ref 5–8)
PLATELET # BLD AUTO: 260 10(3)UL (ref 150–450)
POTASSIUM SERPL-SCNC: 4.1 MMOL/L (ref 3.5–5.1)
PROT UR-MCNC: NEGATIVE MG/DL
PROTHROMBIN TIME: 13 SECONDS (ref 11.6–14.8)
RBC # BLD AUTO: 4.12 X10(6)UL
RBC #/AREA URNS AUTO: >10 /HPF
RH BLOOD TYPE: POSITIVE
SODIUM SERPL-SCNC: 142 MMOL/L (ref 136–145)
SP GR UR STRIP: 1.02 (ref 1–1.03)
UROBILINOGEN UR STRIP-ACNC: NORMAL
WBC # BLD AUTO: 6.3 X10(3) UL (ref 4–11)

## 2025-03-29 PROCEDURE — 71045 X-RAY EXAM CHEST 1 VIEW: CPT | Performed by: UROLOGY

## 2025-03-29 PROCEDURE — 80048 BASIC METABOLIC PNL TOTAL CA: CPT | Performed by: UROLOGY

## 2025-03-29 PROCEDURE — 86900 BLOOD TYPING SEROLOGIC ABO: CPT | Performed by: UROLOGY

## 2025-03-29 PROCEDURE — 36415 COLL VENOUS BLD VENIPUNCTURE: CPT | Performed by: UROLOGY

## 2025-03-29 PROCEDURE — 86901 BLOOD TYPING SEROLOGIC RH(D): CPT | Performed by: UROLOGY

## 2025-03-29 PROCEDURE — 93010 ELECTROCARDIOGRAM REPORT: CPT | Performed by: INTERNAL MEDICINE

## 2025-03-29 PROCEDURE — 87086 URINE CULTURE/COLONY COUNT: CPT | Performed by: UROLOGY

## 2025-03-29 PROCEDURE — 85025 COMPLETE CBC W/AUTO DIFF WBC: CPT | Performed by: UROLOGY

## 2025-03-29 PROCEDURE — 85730 THROMBOPLASTIN TIME PARTIAL: CPT | Performed by: UROLOGY

## 2025-03-29 PROCEDURE — 93005 ELECTROCARDIOGRAM TRACING: CPT

## 2025-03-29 PROCEDURE — 81001 URINALYSIS AUTO W/SCOPE: CPT | Performed by: UROLOGY

## 2025-03-29 PROCEDURE — 86850 RBC ANTIBODY SCREEN: CPT | Performed by: UROLOGY

## 2025-03-29 PROCEDURE — 85610 PROTHROMBIN TIME: CPT | Performed by: UROLOGY

## 2025-03-30 LAB
ATRIAL RATE: 70 BPM
P AXIS: 18 DEGREES
P-R INTERVAL: 134 MS
Q-T INTERVAL: 390 MS
QRS DURATION: 82 MS
QTC CALCULATION (BEZET): 421 MS
R AXIS: 28 DEGREES
T AXIS: 22 DEGREES
VENTRICULAR RATE: 70 BPM

## 2025-04-03 ENCOUNTER — TELEPHONE (OUTPATIENT)
Dept: SURGERY | Facility: CLINIC | Age: 35
End: 2025-04-03

## 2025-04-07 NOTE — TELEPHONE ENCOUNTER
Received disability forms via email from office. Valid authorization attached. Logged for processing.

## 2025-04-08 ENCOUNTER — APPOINTMENT (OUTPATIENT)
Dept: GENERAL RADIOLOGY | Facility: HOSPITAL | Age: 35
End: 2025-04-08
Attending: UROLOGY
Payer: COMMERCIAL

## 2025-04-08 ENCOUNTER — HOSPITAL ENCOUNTER (INPATIENT)
Dept: INTERVENTIONAL RADIOLOGY/VASCULAR | Facility: HOSPITAL | Age: 35
LOS: 1 days | Discharge: HOME | End: 2025-04-09
Attending: UROLOGY | Admitting: RADIOLOGY
Payer: COMMERCIAL

## 2025-04-08 ENCOUNTER — ANESTHESIA (OUTPATIENT)
Dept: SURGERY | Facility: HOSPITAL | Age: 35
End: 2025-04-08
Payer: COMMERCIAL

## 2025-04-08 ENCOUNTER — ANESTHESIA EVENT (OUTPATIENT)
Dept: SURGERY | Facility: HOSPITAL | Age: 35
End: 2025-04-08
Payer: COMMERCIAL

## 2025-04-08 DIAGNOSIS — N20.0 STAGHORN RENAL CALCULUS: Primary | ICD-10-CM

## 2025-04-08 DIAGNOSIS — N20.0 KIDNEY STONE ON LEFT SIDE: ICD-10-CM

## 2025-04-08 LAB
ANION GAP SERPL CALC-SCNC: 8 MMOL/L (ref 0–18)
ANTIBODY SCREEN: NEGATIVE
B-HCG UR QL: NEGATIVE
BUN BLD-MCNC: 9 MG/DL (ref 9–23)
BUN/CREAT SERPL: 12.3 (ref 10–20)
CALCIUM BLD-MCNC: 7.7 MG/DL (ref 8.7–10.4)
CHLORIDE SERPL-SCNC: 110 MMOL/L (ref 98–112)
CO2 SERPL-SCNC: 22 MMOL/L (ref 21–32)
CREAT BLD-MCNC: 0.73 MG/DL
DEPRECATED RDW RBC AUTO: 46.6 FL (ref 35.1–46.3)
EGFRCR SERPLBLD CKD-EPI 2021: 110 ML/MIN/1.73M2 (ref 60–?)
ERYTHROCYTE [DISTWIDTH] IN BLOOD BY AUTOMATED COUNT: 13.7 % (ref 11–15)
FASTING STATUS PATIENT QL REPORTED: YES
GLUCOSE BLD-MCNC: 116 MG/DL (ref 70–99)
GLUCOSE BLDC GLUCOMTR-MCNC: 154 MG/DL (ref 70–99)
GLUCOSE BLDC GLUCOMTR-MCNC: 78 MG/DL (ref 70–99)
HCT VFR BLD AUTO: 40.6 %
HGB BLD-MCNC: 12.9 G/DL
MCH RBC QN AUTO: 29.5 PG (ref 26–34)
MCHC RBC AUTO-ENTMCNC: 31.8 G/DL (ref 31–37)
MCV RBC AUTO: 92.7 FL
OSMOLALITY SERPL CALC.SUM OF ELEC: 290 MOSM/KG (ref 275–295)
PLATELET # BLD AUTO: 239 10(3)UL (ref 150–450)
POTASSIUM SERPL-SCNC: 4.6 MMOL/L (ref 3.5–5.1)
RBC # BLD AUTO: 4.38 X10(6)UL
RH BLOOD TYPE: POSITIVE
SODIUM SERPL-SCNC: 140 MMOL/L (ref 136–145)
WBC # BLD AUTO: 17.4 X10(3) UL (ref 4–11)

## 2025-04-08 PROCEDURE — 99222 1ST HOSP IP/OBS MODERATE 55: CPT | Performed by: HOSPITALIST

## 2025-04-08 PROCEDURE — 71045 X-RAY EXAM CHEST 1 VIEW: CPT | Performed by: UROLOGY

## 2025-04-08 PROCEDURE — 0T778DZ DILATION OF LEFT URETER WITH INTRALUMINAL DEVICE, VIA NATURAL OR ARTIFICIAL OPENING ENDOSCOPIC: ICD-10-PCS | Performed by: UROLOGY

## 2025-04-08 PROCEDURE — BT1F1ZZ FLUOROSCOPY OF LEFT KIDNEY, URETER AND BLADDER USING LOW OSMOLAR CONTRAST: ICD-10-PCS | Performed by: RADIOLOGY

## 2025-04-08 PROCEDURE — 0TC13ZZ EXTIRPATION OF MATTER FROM LEFT KIDNEY, PERCUTANEOUS APPROACH: ICD-10-PCS | Performed by: UROLOGY

## 2025-04-08 PROCEDURE — 74018 RADEX ABDOMEN 1 VIEW: CPT | Performed by: UROLOGY

## 2025-04-08 PROCEDURE — 50081 PERQ NL/PL LITHOTRP CPLX>2CM: CPT | Performed by: UROLOGY

## 2025-04-08 DEVICE — URETERAL STENT
Type: IMPLANTABLE DEVICE | Site: URETER | Status: FUNCTIONAL
Brand: CONTOUR™

## 2025-04-08 RX ORDER — HYDROMORPHONE HYDROCHLORIDE 1 MG/ML
0.2 INJECTION, SOLUTION INTRAMUSCULAR; INTRAVENOUS; SUBCUTANEOUS EVERY 5 MIN PRN
Status: DISCONTINUED | OUTPATIENT
Start: 2025-04-08 | End: 2025-04-08 | Stop reason: HOSPADM

## 2025-04-08 RX ORDER — OXYCODONE HYDROCHLORIDE 5 MG/1
10 TABLET ORAL EVERY 6 HOURS PRN
Status: DISCONTINUED | OUTPATIENT
Start: 2025-04-08 | End: 2025-04-09

## 2025-04-08 RX ORDER — SODIUM CHLORIDE, SODIUM LACTATE, POTASSIUM CHLORIDE, CALCIUM CHLORIDE 600; 310; 30; 20 MG/100ML; MG/100ML; MG/100ML; MG/100ML
INJECTION, SOLUTION INTRAVENOUS CONTINUOUS
Status: DISCONTINUED | OUTPATIENT
Start: 2025-04-08 | End: 2025-04-08 | Stop reason: HOSPADM

## 2025-04-08 RX ORDER — HYDROMORPHONE HYDROCHLORIDE 1 MG/ML
0.4 INJECTION, SOLUTION INTRAMUSCULAR; INTRAVENOUS; SUBCUTANEOUS EVERY 5 MIN PRN
Status: DISCONTINUED | OUTPATIENT
Start: 2025-04-08 | End: 2025-04-08 | Stop reason: HOSPADM

## 2025-04-08 RX ORDER — SODIUM CHLORIDE, SODIUM LACTATE, POTASSIUM CHLORIDE, CALCIUM CHLORIDE 600; 310; 30; 20 MG/100ML; MG/100ML; MG/100ML; MG/100ML
INJECTION, SOLUTION INTRAVENOUS CONTINUOUS PRN
Status: DISCONTINUED | OUTPATIENT
Start: 2025-04-08 | End: 2025-04-08 | Stop reason: SURG

## 2025-04-08 RX ORDER — ACETAMINOPHEN 500 MG
1000 TABLET ORAL ONCE
Status: DISCONTINUED | OUTPATIENT
Start: 2025-04-08 | End: 2025-04-08 | Stop reason: HOSPADM

## 2025-04-08 RX ORDER — MIDAZOLAM HYDROCHLORIDE 1 MG/ML
INJECTION INTRAMUSCULAR; INTRAVENOUS
Status: COMPLETED
Start: 2025-04-08 | End: 2025-04-08

## 2025-04-08 RX ORDER — ROCURONIUM BROMIDE 10 MG/ML
INJECTION, SOLUTION INTRAVENOUS AS NEEDED
Status: DISCONTINUED | OUTPATIENT
Start: 2025-04-08 | End: 2025-04-08 | Stop reason: SURG

## 2025-04-08 RX ORDER — HYDROMORPHONE HYDROCHLORIDE 1 MG/ML
0.6 INJECTION, SOLUTION INTRAMUSCULAR; INTRAVENOUS; SUBCUTANEOUS EVERY 5 MIN PRN
Status: DISCONTINUED | OUTPATIENT
Start: 2025-04-08 | End: 2025-04-08 | Stop reason: HOSPADM

## 2025-04-08 RX ORDER — ACETAMINOPHEN 10 MG/ML
1000 INJECTION, SOLUTION INTRAVENOUS EVERY 8 HOURS
Status: COMPLETED | OUTPATIENT
Start: 2025-04-08 | End: 2025-04-09

## 2025-04-08 RX ORDER — EPHEDRINE SULFATE 50 MG/ML
INJECTION, SOLUTION INTRAVENOUS AS NEEDED
Status: DISCONTINUED | OUTPATIENT
Start: 2025-04-08 | End: 2025-04-08 | Stop reason: SURG

## 2025-04-08 RX ORDER — DEXAMETHASONE SODIUM PHOSPHATE 4 MG/ML
VIAL (ML) INJECTION AS NEEDED
Status: DISCONTINUED | OUTPATIENT
Start: 2025-04-08 | End: 2025-04-08 | Stop reason: SURG

## 2025-04-08 RX ORDER — FAMOTIDINE 10 MG/ML
20 INJECTION, SOLUTION INTRAVENOUS ONCE
Status: COMPLETED | OUTPATIENT
Start: 2025-04-08 | End: 2025-04-08

## 2025-04-08 RX ORDER — IOPAMIDOL 612 MG/ML
INJECTION, SOLUTION INTRATHECAL AS NEEDED
Status: DISCONTINUED | OUTPATIENT
Start: 2025-04-08 | End: 2025-04-08 | Stop reason: HOSPADM

## 2025-04-08 RX ORDER — GLYCOPYRROLATE 0.2 MG/ML
INJECTION, SOLUTION INTRAMUSCULAR; INTRAVENOUS AS NEEDED
Status: DISCONTINUED | OUTPATIENT
Start: 2025-04-08 | End: 2025-04-08 | Stop reason: SURG

## 2025-04-08 RX ORDER — HYDROMORPHONE HYDROCHLORIDE 1 MG/ML
0.5 INJECTION, SOLUTION INTRAMUSCULAR; INTRAVENOUS; SUBCUTANEOUS EVERY 4 HOURS PRN
Status: DISCONTINUED | OUTPATIENT
Start: 2025-04-08 | End: 2025-04-09

## 2025-04-08 RX ORDER — METOCLOPRAMIDE 10 MG/1
10 TABLET ORAL ONCE
Status: COMPLETED | OUTPATIENT
Start: 2025-04-08 | End: 2025-04-08

## 2025-04-08 RX ORDER — BUPIVACAINE HYDROCHLORIDE 5 MG/ML
INJECTION, SOLUTION EPIDURAL; INTRACAUDAL; PERINEURAL AS NEEDED
Status: DISCONTINUED | OUTPATIENT
Start: 2025-04-08 | End: 2025-04-08 | Stop reason: HOSPADM

## 2025-04-08 RX ORDER — NALOXONE HYDROCHLORIDE 0.4 MG/ML
0.08 INJECTION, SOLUTION INTRAMUSCULAR; INTRAVENOUS; SUBCUTANEOUS AS NEEDED
Status: DISCONTINUED | OUTPATIENT
Start: 2025-04-08 | End: 2025-04-08 | Stop reason: HOSPADM

## 2025-04-08 RX ORDER — ONDANSETRON 2 MG/ML
4 INJECTION INTRAMUSCULAR; INTRAVENOUS EVERY 6 HOURS PRN
Status: DISCONTINUED | OUTPATIENT
Start: 2025-04-08 | End: 2025-04-09

## 2025-04-08 RX ORDER — METOCLOPRAMIDE HYDROCHLORIDE 5 MG/ML
INJECTION INTRAMUSCULAR; INTRAVENOUS
Status: COMPLETED
Start: 2025-04-08 | End: 2025-04-08

## 2025-04-08 RX ORDER — PHENYLEPHRINE HCL 10 MG/ML
VIAL (ML) INJECTION AS NEEDED
Status: DISCONTINUED | OUTPATIENT
Start: 2025-04-08 | End: 2025-04-08 | Stop reason: SURG

## 2025-04-08 RX ORDER — DOCUSATE SODIUM 100 MG/1
100 CAPSULE, LIQUID FILLED ORAL 2 TIMES DAILY
Status: DISCONTINUED | OUTPATIENT
Start: 2025-04-08 | End: 2025-04-09

## 2025-04-08 RX ORDER — FAMOTIDINE 20 MG/1
20 TABLET, FILM COATED ORAL ONCE
Status: COMPLETED | OUTPATIENT
Start: 2025-04-08 | End: 2025-04-08

## 2025-04-08 RX ORDER — METOCLOPRAMIDE HYDROCHLORIDE 5 MG/ML
10 INJECTION INTRAMUSCULAR; INTRAVENOUS ONCE
Status: COMPLETED | OUTPATIENT
Start: 2025-04-08 | End: 2025-04-08

## 2025-04-08 RX ORDER — IOPAMIDOL 612 MG/ML
30 INJECTION, SOLUTION INTRAVASCULAR
Status: COMPLETED | OUTPATIENT
Start: 2025-04-08 | End: 2025-04-08

## 2025-04-08 RX ORDER — SODIUM CHLORIDE 9 MG/ML
INJECTION, SOLUTION INTRAVENOUS CONTINUOUS
Status: DISCONTINUED | OUTPATIENT
Start: 2025-04-08 | End: 2025-04-08

## 2025-04-08 RX ORDER — PROCHLORPERAZINE EDISYLATE 5 MG/ML
5 INJECTION INTRAMUSCULAR; INTRAVENOUS EVERY 8 HOURS PRN
Status: DISCONTINUED | OUTPATIENT
Start: 2025-04-08 | End: 2025-04-09

## 2025-04-08 RX ORDER — SODIUM CHLORIDE 9 MG/ML
INJECTION, SOLUTION INTRAVENOUS CONTINUOUS
Status: DISCONTINUED | OUTPATIENT
Start: 2025-04-08 | End: 2025-04-09

## 2025-04-08 RX ORDER — LIDOCAINE HYDROCHLORIDE 20 MG/ML
INJECTION, SOLUTION EPIDURAL; INFILTRATION; INTRACAUDAL; PERINEURAL
Status: COMPLETED
Start: 2025-04-08 | End: 2025-04-08

## 2025-04-08 RX ORDER — SENNOSIDES 8.6 MG
17.2 TABLET ORAL NIGHTLY
Status: DISCONTINUED | OUTPATIENT
Start: 2025-04-08 | End: 2025-04-09

## 2025-04-08 RX ORDER — LIDOCAINE HYDROCHLORIDE 10 MG/ML
INJECTION, SOLUTION EPIDURAL; INFILTRATION; INTRACAUDAL; PERINEURAL AS NEEDED
Status: DISCONTINUED | OUTPATIENT
Start: 2025-04-08 | End: 2025-04-08 | Stop reason: SURG

## 2025-04-08 RX ORDER — POLYETHYLENE GLYCOL 3350 17 G/17G
17 POWDER, FOR SOLUTION ORAL DAILY PRN
Status: DISCONTINUED | OUTPATIENT
Start: 2025-04-08 | End: 2025-04-09

## 2025-04-08 RX ORDER — ONDANSETRON 2 MG/ML
INJECTION INTRAMUSCULAR; INTRAVENOUS AS NEEDED
Status: DISCONTINUED | OUTPATIENT
Start: 2025-04-08 | End: 2025-04-08 | Stop reason: SURG

## 2025-04-08 RX ORDER — OXYCODONE HYDROCHLORIDE 5 MG/1
5 TABLET ORAL EVERY 6 HOURS PRN
Status: DISCONTINUED | OUTPATIENT
Start: 2025-04-08 | End: 2025-04-09

## 2025-04-08 RX ORDER — LIDOCAINE HYDROCHLORIDE 20 MG/ML
INJECTION, SOLUTION INFILTRATION; PERINEURAL
Status: COMPLETED
Start: 2025-04-08 | End: 2025-04-08

## 2025-04-08 RX ADMIN — DEXAMETHASONE SODIUM PHOSPHATE 4 MG: 4 MG/ML VIAL (ML) INJECTION at 11:28:00

## 2025-04-08 RX ADMIN — DOCUSATE SODIUM 100 MG: 100 CAPSULE, LIQUID FILLED ORAL at 19:58:00

## 2025-04-08 RX ADMIN — SODIUM CHLORIDE: 9 INJECTION, SOLUTION INTRAVENOUS at 14:34:00

## 2025-04-08 RX ADMIN — ACETAMINOPHEN 1000 MG: 10 INJECTION, SOLUTION INTRAVENOUS at 14:36:00

## 2025-04-08 RX ADMIN — ACETAMINOPHEN 1000 MG: 10 INJECTION, SOLUTION INTRAVENOUS at 22:22:00

## 2025-04-08 RX ADMIN — ROCURONIUM BROMIDE 10 MG: 10 INJECTION, SOLUTION INTRAVENOUS at 11:51:00

## 2025-04-08 RX ADMIN — SODIUM CHLORIDE: 9 INJECTION, SOLUTION INTRAVENOUS at 10:26:00

## 2025-04-08 RX ADMIN — IOPAMIDOL 30 ML: 612 INJECTION, SOLUTION INTRAVASCULAR at 08:55:00

## 2025-04-08 RX ADMIN — ROCURONIUM BROMIDE 50 MG: 10 INJECTION, SOLUTION INTRAVENOUS at 10:21:00

## 2025-04-08 RX ADMIN — SODIUM CHLORIDE: 9 INJECTION, SOLUTION INTRAVENOUS at 09:05:00

## 2025-04-08 RX ADMIN — PHENYLEPHRINE HCL 100 MCG: 10 MG/ML VIAL (ML) INJECTION at 11:01:00

## 2025-04-08 RX ADMIN — ROCURONIUM BROMIDE 10 MG: 10 INJECTION, SOLUTION INTRAVENOUS at 11:29:00

## 2025-04-08 RX ADMIN — EPHEDRINE SULFATE 10 MG: 50 INJECTION, SOLUTION INTRAVENOUS at 11:04:00

## 2025-04-08 RX ADMIN — ONDANSETRON 4 MG: 2 INJECTION INTRAMUSCULAR; INTRAVENOUS at 12:37:00

## 2025-04-08 RX ADMIN — PHENYLEPHRINE HCL 100 MCG: 10 MG/ML VIAL (ML) INJECTION at 10:38:00

## 2025-04-08 RX ADMIN — METOCLOPRAMIDE HYDROCHLORIDE 10 MG: 5 INJECTION INTRAMUSCULAR; INTRAVENOUS at 09:33:00

## 2025-04-08 RX ADMIN — EPHEDRINE SULFATE 10 MG: 50 INJECTION, SOLUTION INTRAVENOUS at 11:40:00

## 2025-04-08 RX ADMIN — SENNOSIDES 17.2 MG: 8.6 MG TABLET ORAL at 19:58:00

## 2025-04-08 RX ADMIN — SODIUM CHLORIDE, SODIUM LACTATE, POTASSIUM CHLORIDE, CALCIUM CHLORIDE: 600; 310; 30; 20 INJECTION, SOLUTION INTRAVENOUS at 10:09:00

## 2025-04-08 RX ADMIN — PHENYLEPHRINE HCL 100 MCG: 10 MG/ML VIAL (ML) INJECTION at 10:49:00

## 2025-04-08 RX ADMIN — LIDOCAINE HYDROCHLORIDE 50 MG: 10 INJECTION, SOLUTION EPIDURAL; INFILTRATION; INTRACAUDAL; PERINEURAL at 10:21:00

## 2025-04-08 RX ADMIN — HYDROMORPHONE HYDROCHLORIDE 0.5 MG: 1 INJECTION, SOLUTION INTRAMUSCULAR; INTRAVENOUS; SUBCUTANEOUS at 14:20:00

## 2025-04-08 RX ADMIN — SODIUM CHLORIDE: 9 INJECTION, SOLUTION INTRAVENOUS at 22:22:00

## 2025-04-08 RX ADMIN — GLYCOPYRROLATE 0.2 MG: 0.2 INJECTION, SOLUTION INTRAMUSCULAR; INTRAVENOUS at 10:21:00

## 2025-04-08 RX ADMIN — FAMOTIDINE 20 MG: 10 INJECTION, SOLUTION INTRAVENOUS at 09:32:00

## 2025-04-08 RX ADMIN — OXYCODONE HYDROCHLORIDE 10 MG: 5 TABLET ORAL at 16:53:00

## 2025-04-08 NOTE — H&P
History & Physical Examination    Patient Name: Karina Gil  MRN: [id number]  CSN: [id number]  YOB: 1990    Diagnosis: nephrolithiasis    Present Illness: L renal stone.  Nephrostomy planned preop nephrolithotomy by urology    Prescriptions Prior to Admission[1]  Current Facility-Administered Medications   Medication Dose Route Frequency    sodium chloride 0.9% infusion   Intravenous Continuous    acetaminophen (Tylenol Extra Strength) tab 1,000 mg  1,000 mg Oral Once    famotidine (Pepcid) tab 20 mg  20 mg Oral Once    Or    famotidine (Pepcid) 20 mg/2mL injection 20 mg  20 mg Intravenous Once    metoclopramide (Reglan) tab 10 mg  10 mg Oral Once    Or    metoclopramide (Reglan) 5 mg/mL injection 10 mg  10 mg Intravenous Once    cefTRIAXone (Rocephin) 2 g in sodium chloride 0.9% 100 mL IVPB-ADDV  2 g Intravenous Once    sodium chloride 0.9% infusion   Intravenous Continuous    gentamicin (Garamycin) 300 mg in sodium chloride 0.9% 100 mL IVPB  5 mg/kg (Adjusted) Intravenous Once       Allergies: Allergies[2]    Past Medical History:    Abnormal uterine bleeding    Amenorrhea    BMI 37.0-37.9, adult    Calculus of kidney    Decorative tattoo    Disorder of thyroid    Hormone disorder    Per pt had imbalance of progesterone     Hyperlipidemia    diet controlled     Hypothyroid    Kidney stones    x2 on the L side    Miscarriage (HCC)    Pneumonia due to organism     labor (HCC)    Subclinical iodine-deficiency hypothyroidism     Past Surgical History:   Procedure Laterality Date    Cystoscopy,insert ureteral stent      D & c        2012    stillbirth @ 6 month          Removal of kidney stone Right 2017    Kidney stone removal with assiste with laser    Tubal ligation       Family History   Problem Relation Age of Onset    Diabetes Father     Diabetes Mother     No Known Problems Daughter     No Known Problems Sister     Other (renal calculi) Brother     No Known Problems  Brother      Social History     Tobacco Use    Smoking status: Never    Smokeless tobacco: Never    Tobacco comments:     Occasioal social smoker    Substance Use Topics    Alcohol use: Not Currently     Comment: socially       SYSTEM Check if Review is Normal Check if Physical Exam is Normal If not normal, please explain:   HEENT [x ] [x ]    NECK & BACK [x ] [x ]    HEART [x ] [x ]    LUNGS [x ] [ x]    ABDOMEN [x ] [ x]    UROGENITAL [x ] [x ]    EXTREMITIES [x ] [ x]    OTHER        [ x ] I have discussed the risks and benefits and alternatives with the patient/family.  They understand and agree to proceed with plan of care.  [ x ] I have reviewed the History and Physical done within the last 30 days.  Any changes noted above.    Kody Johnson MD  4/8/2025  9:12 AM           [1]   Medications Prior to Admission   Medication Sig Dispense Refill Last Dose/Taking    Phentermine HCl 30 MG Oral Cap Take 1 capsule (30 mg total) by mouth every morning.   3/19/2025   [2] No Known Allergies

## 2025-04-08 NOTE — ANESTHESIA POSTPROCEDURE EVALUATION
Patient: Karina Gil    Procedure Summary       Date: 04/08/25 Room / Location: [facility] MAIN OR  / [facility] MAIN OR    Anesthesia Start: 1017 Anesthesia Stop: 1305    Procedure: Left percutaneous nephrolithotomy, cystoscopy, left stent placement (Left: Back) Diagnosis:       Kidney stone on left side      Staghorn renal calculus      (Kidney stone on left side [N20.0]Staghorn renal calculus [N20.0])    Surgeons: Mark Santos MD Anesthesiologist: Ghada Meléndez MD    Anesthesia Type: general ASA Status: 2            Anesthesia Type: general    Vitals Value Taken Time   /91 04/08/25 1400   Temp 97 °F (36.1 °C) 04/08/25 1300   Pulse 75 04/08/25 1421   Resp 21 04/08/25 1421   SpO2 100 % 04/08/25 1421   Vitals shown include unfiled device data.    [facility] AN Post Evaluation:   Patient Evaluated in PACU  Patient Participation: complete - patient participated  Level of Consciousness: awake  Pain Score: 0  Pain Management: adequate  Airway Patency:patent  Dental exam unchanged from preop  Yes    Nausea/Vomiting: none  Cardiovascular Status: acceptable  Respiratory Status: acceptable  Postoperative Hydration acceptable  Comments: 16:00  Patient C/O L eye irritation on the lateral side.   On Exam there is very slight redness in her left eye at inferolateral aspect of the sclera. Patient was able to open her eyes and she said it is better after she was given artificial tears.      Ghada Meléndez MD  4/8/2025 2:22 PM

## 2025-04-08 NOTE — ANESTHESIA PREPROCEDURE EVALUATION
Anesthesia PreOp Note    HPI:     Karina Gil is a 34 year old female who presents for preoperative consultation requested by: Mark Santos MD    Date of Surgery: 2025    Procedure(s):  Left percutaneous nephrolithotomy  Indication: Kidney stone on left side [N20.0]  Staghorn renal calculus [N20.0]    Relevant Problems   No relevant active problems       NPO:                         History Review:  Patient Active Problem List    Diagnosis Date Noted    Staghorn renal calculus 2025    Encounter for female sterilization procedure     Other specified hypothyroidism 2021    BMI 37.0-37.9, adult     Anemia 2020    Pneumonia due to COVID-19 virus 2020    Hypercholesteremia 2019    Class 2 obesity due to excess calories without serious comorbidity with body mass index (BMI) of 37.0 to 37.9 in adult 2019    Vaginal yeast infection 2014       Past Medical History:    Abnormal uterine bleeding    Amenorrhea    BMI 37.0-37.9, adult    Calculus of kidney    Decorative tattoo    Disorder of thyroid    Hormone disorder    Per pt had imbalance of progesterone     Hyperlipidemia    diet controlled     Hypothyroid    Kidney stones    x2 on the L side    Miscarriage (HCC)    Pneumonia due to organism     labor (HCC)    Subclinical iodine-deficiency hypothyroidism       Past Surgical History:   Procedure Laterality Date    Cystoscopy,insert ureteral stent      D & c            stillbirth @ 6 month      2015    Removal of kidney stone Right 2017    Kidney stone removal with assiste with laser    Tubal ligation         Prescriptions Prior to Admission[1]  Current Medications and Prescriptions Ordered in Epic[2]    Allergies[3]    Family History   Problem Relation Age of Onset    Diabetes Father     Diabetes Mother     No Known Problems Daughter     No Known Problems Sister     Other (renal calculi) Brother     No Known Problems Brother      Social History      Socioeconomic History    Marital status:    Tobacco Use    Smoking status: Never    Smokeless tobacco: Never    Tobacco comments:     Occasioal social smoker    Vaping Use    Vaping status: Never Used   Substance and Sexual Activity    Alcohol use: Not Currently     Comment: socially    Drug use: Never       Available pre-op labs reviewed.  Lab Results   Component Value Date    WBC 6.3 03/29/2025    RBC 4.12 03/29/2025    HGB 12.1 03/29/2025    HCT 37.4 03/29/2025    MCV 90.8 03/29/2025    MCH 29.4 03/29/2025    MCHC 32.4 03/29/2025    RDW 13.5 03/29/2025    .0 03/29/2025     Lab Results   Component Value Date     03/29/2025    K 4.1 03/29/2025     03/29/2025    CO2 29.0 03/29/2025    BUN 11 03/29/2025    CREATSERUM 0.63 03/29/2025    GLU 88 03/29/2025    CA 9.0 03/29/2025     Lab Results   Component Value Date    INR 0.93 03/29/2025       Vital Signs:  Body mass index is 34.96 kg/m².   height is 1.524 m (5') and weight is 81.2 kg (179 lb).   Vitals:    03/26/25 1636   Weight: 81.2 kg (179 lb)   Height: 1.524 m (5')        Anesthesia Evaluation     Patient summary reviewed and Nursing notes reviewed    No history of anesthetic complications   Airway   Mallampati: I  TM distance: >3 FB  Neck ROM: full  Dental      Pulmonary - negative ROS and normal exam   Cardiovascular - negative ROS and normal exam    Neuro/Psych - negative ROS     GI/Hepatic/Renal    (+) chronic renal disease    Endo/Other    Abdominal                  Anesthesia Plan:   ASA:  2  Plan:   General  Informed Consent Plan and Risks Discussed With:  Patient      I have informed Karina Gil and/or legal guardian or family member of the nature of the anesthetic plan, benefits, risks including possible dental damage if relevant, major complications, and any alternative forms of anesthetic management.   All of the patient's questions were answered to the best of my ability. The patient desires the anesthetic management as  planned.  Renetta Locke MD  4/8/2025 7:32 AM  Present on Admission:  **None**           [1]   Medications Prior to Admission   Medication Sig Dispense Refill Last Dose/Taking    Phentermine HCl 30 MG Oral Cap Take 1 capsule (30 mg total) by mouth every morning.   3/26/2025   [2]   Current Facility-Administered Medications Ordered in Epic   Medication Dose Route Frequency Provider Last Rate Last Admin    sodium chloride 0.9% infusion   Intravenous Continuous Kody Johnson MD        acetaminophen (Tylenol Extra Strength) tab 1,000 mg  1,000 mg Oral Once Mark Santos MD        famotidine (Pepcid) tab 20 mg  20 mg Oral Once Mark Santos MD        Or    famotidine (Pepcid) 20 mg/2mL injection 20 mg  20 mg Intravenous Once Mark Santos MD        metoclopramide (Reglan) tab 10 mg  10 mg Oral Once Mark Santos MD        Or    metoclopramide (Reglan) 5 mg/mL injection 10 mg  10 mg Intravenous Once Mark Santos MD        cefTRIAXone (Rocephin) 2 g in sodium chloride 0.9% 100 mL IVPB-ADDV  2 g Intravenous Once Mark Santos MD        sodium chloride 0.9% infusion   Intravenous Continuous Mark Santos MD         No current [facility]-ordered outpatient medications on file.   [3] No Known Allergies

## 2025-04-08 NOTE — ANESTHESIA PROCEDURE NOTES
Airway  Date/Time: 4/8/2025 10:22 AM  Urgency: elective    Airway not difficult    General Information and Staff    Patient location during procedure: OR  Anesthesiologist: Ghada Meléndez MD  Performed: anesthesiologist   Performed by: Ghada Meléndez MD  Authorized by: Ghada Meléndez MD      Indications and Patient Condition  Indications for airway management: anesthesia  Spontaneous ventilation: present  Sedation level: deep  Preoxygenated: yes  Patient position: sniffing  Mask difficulty assessment: 1 - vent by mask    Final Airway Details  Final airway type: endotracheal airway      Successful airway: ETT  Cuffed: yes   Successful intubation technique: Video laryngoscopy  Endotracheal tube insertion site: oral  Blade: Fili  Blade size: #3  ETT size (mm): 7.5    Cormack-Lehane Classification: grade I - full view of glottis  Placement verified by: capnometry   Measured from: lips  Number of attempts at approach: 1  Number of other approaches attempted: 0

## 2025-04-08 NOTE — H&P
History & Physical Examination    Patient Name: Karina Gil  MRN: [id number]  CSN: [id number]  YOB: 1990    Diagnosis: Left kidney stone.     Present Illness: 34 year old female with bilateral nephrolithiasis.  Index stone includes a 3 x 2.1 cm right upper pole staghorn configuration kidney stone.     Different stone management options were discussed including watchful waiting, ESWL with pre-stenting, ureteroscopy with laser lithotripsy, and percutaneous nephrolithotomy.       Rationale and approach as well as benefits, risks, possible complications and reasonable alternatives of each treatment were discussed with the patient.  Stone clearance rates were discussed as well as the expected postoperative course of recovery. We discussed the eventual need for stent removal which is usually performed in the office setting. We discussed risks of surgery including but not limited to medical and anesthetic complications, bleeding potentially requiring transfusion, infection, damage to surrounding organs or structures, pneumothorax, ureteral injury, stricture formation, and possible need for additional procedures or staged approach. Patient verbalized understanding. All questions were answered. She would like to proceed with a left percutaneous nephrolithotomy (PCNL).    Allergies: Allergies[1]    Past Medical History:    Abnormal uterine bleeding    Amenorrhea    BMI 37.0-37.9, adult    Calculus of kidney    Decorative tattoo    Disorder of thyroid    Hormone disorder    Per pt had imbalance of progesterone     Hyperlipidemia    diet controlled     Hypothyroid    Kidney stones    x2 on the L side    Miscarriage (HCC)    Pneumonia due to organism     labor (HCC)    Subclinical iodine-deficiency hypothyroidism     Past Surgical History:   Procedure Laterality Date    Cystoscopy,insert ureteral stent      D & c            stillbirth @ 6 month          Removal of kidney stone Right  2017    Kidney stone removal with assiste with laser    Tubal ligation       Family History   Problem Relation Age of Onset    Diabetes Father     Diabetes Mother     No Known Problems Daughter     No Known Problems Sister     Other (renal calculi) Brother     No Known Problems Brother      Social History     Tobacco Use    Smoking status: Never    Smokeless tobacco: Never    Tobacco comments:     Occasioal social smoker    Substance Use Topics    Alcohol use: Not Currently     Comment: socially       SYSTEM Check if Review is Normal Check if Physical Exam is Normal If not normal, please explain:   HEENT [X] [X]    NECK & BACK [X] [X]    HEART [X] [X]    LUNGS [X] [X]    ABDOMEN [X] [CATHETERS FROM LEFT FLANK X 2]    UROGENITAL [X] [X]    EXTREMITIES [X] [X]    OTHER        [ x ] I have discussed the risks and benefits and alternatives with the patient/family.  They understand and agree to proceed with plan of care.  [ x ] I have reviewed the History and Physical done within the last 30 days.  Any changes noted above.    Mark Santos MD  4/8/2025 10:19 AM       [1] No Known Allergies

## 2025-04-08 NOTE — OPERATIVE REPORT
[facility]  part of [facility]  Urology Operative Note         Karina Gil Location: OR   [facility] [id number] MRN [id number]   Admission Date 4/8/2025 Operation Date 4/8/2025   Attending Physician Mark Santos MD       Patient Name: Karina Gil     Preoperative Diagnosis: Kidney stone on left side [N20.0]      Postoperative Diagnosis: Kidney stone on left side [N20.0]    Procedure(s):  Left Percutaneous nephrolithotomy (PCNL), 3 centimeters in total.  Left Antegrade pyelogram  Left flexible nephroureteroscopy  Placement of left nephrostomy tube, Godoy catheter and double-J ureteral stent    Primary Surgeon: Mark Santos MD     Anesthesia: General ETT.    Specimen:   ID Type Source Tests Collected by Time Destination   1 : 1. left kidney stone fragments Calculus Kidney stone (calculus) CALCULI, URINARY, SURGICAL PATHOLOGY TISSUE Mark Santos MD 4/8/2025 12:28 PM         Estimated Blood Loss: 20 mL.   Complications: None.      Indications for procedure: 34 year old female with bilateral nephrolithiasis.  Index stone includes a 3 x 2.1 cm right upper pole staghorn configuration kidney stone. Different stone management options were discussed including watchful waiting, ESWL with pre-stenting, ureteroscopy with laser lithotripsy, and percutaneous nephrolithotomy.  Rationale and approach as well as benefits, risks, possible complications and reasonable alternatives of each treatment were discussed with the patient.  Stone clearance rates were discussed as well as the expected postoperative course of recovery. We discussed the eventual need for stent removal which is usually performed in the office setting. We discussed risks of surgery including but not limited to medical and anesthetic complications, bleeding potentially requiring transfusion, infection, damage to surrounding organs or structures, pneumothorax, ureteral injury, stricture formation, and possible need for additional  procedures or staged approach. Patient verbalized understanding. All questions were answered. She would like to proceed with a left percutaneous nephrolithotomy (PCNL).     Surgical Findings: No modification of the intended routine.  Left percutaneous nephrolithotomy performed for management of a 3 cm left upper pole kidney stone, which was completely fragmented and fragments removed.  Hemostasis satisfactory.    Operative Summary:  The patient was met in the preoperative holding area. Appropriate informed consent was obtained and the patient was brought to the operative suite. Appropriate timeout was performed per hospital protocol.  Appropriate monitoring devices were connected to the patient.  IV antibiotics were given for surgical prophylaxis.  SCDs were applied for DVT prophylaxis.  After the successful induction of general anesthesia, we placed the patient in frog-leg position while still on the stretcher.  The genitalia were prepped and draped sterilely.  Flexible cystoscopy was performed.  We visualized the distal end of the nephroureteral catheter that was recently inserted by interventional radiology and grasped it using a flexible cystoscopic grasper and brought it out through the urethral meatus so that we can have through and through access if needed.  A cap was connected to the distal end.  A 16 Bengali urethral Godoy catheter was inserted into the bladder per urethra and connected to straight drainage.      The patient was then placed in the prone position. Pressure points were meticulously padded. The patient was prepped and draped in a standard sterile fashion.  I had 2 access points given by interventional radiology, one to the upper pole calyx directly to where the stone is and another one to the lower pole calyx of the kidney.  I elected to use the upper pole access.  The stone was radiopaque in the region of the upper pole.  We introduced an Amplatz stiff wire through the upper pole nephrostomy  tube down to the level of the bladder under fluoroscopic guidance.  An incision was made in the skin with an 11 blade scalpel.  We used a Sun [employer] 10 Peruvian dual-lumen to dilate the tract and placed a second wire into the bladder (0.035 sensor guidewire).  The nephrostomy catheter was removed while maintaining both wires in place.  The sensor wire was secured as a safety wire.  We used the Sun Scientific NephroMax 30 Peruvian by 12 cm balloon dilator kit to inflate and dilate the tract over the Super Stiff wire.  The provided sheath was placed over the balloon and the balloon was deflated.  The nephroscope was used to access the left upper pole kidney stone.  It was about 3 cm in size and was well-visualized.  The Sun Scientific Swiss LithoClast Trilogy lithotripter was introduced and the stone was completely fragmented and the stone fragments were removed/suctioned under direct visualization.    We performed a full pyeloscopy first using a flexible cystoscope and then antegrade left ureteroscopy down to the UVJ using a [employer] lithovue Elite flexible ureteroscope.  No additional stones or stone fragments identified.  We then completed an antegrade pyelogram and there were no further stones left over or contrast extravasation.    At this point I then placed a Sun Scientific 6 Peruvian by 24 cm ureteral stent from above with fluoroscopic and visual guidance.  The lower pole nephroureteral catheter was then removed.  Following this, we placed a nephrostomy tube (18 Peruvian [place] tip).  In antegrade was performed as well which demonstrated good position of the tube.  We used 3 cc of sterile water mixed with contrast to inflate the balloon.  The procedure was then complete.  A 2-0 silk suture was used to place a stitch to secure the nephrostomy tube.  Dry dressing was applied at the site.  Patient was then awoken from anesthesia and taken to the recovery room in stable condition.  All counts  were correct x2.     Implants:   Implant Name Type Inv. Item Serial No.  Lot No. LRB No. Used Action   STENT URET 6FR L24CM PERCFLX HYDR+ TAPR LG - SN/A  STENT URET 6FR L24CM PERCFLX HYDR+ TAPR LG N/A [employer] WD [id number] Left 1 Implanted        Drains:   1.  16 Belgian urethral Godoy catheter, to straight drainage  2.  6 Belgian by 24 cm double-J left ureteral stent.  3.  18 Belgian [place] tip catheter as a left nephrostomy tube, to straight drainage.    Condition: Extubated and stable to PACU.    I was present for the entire procedure.  At her request, findings were reviewed with patient's family following the procedure      Mark Santos MD

## 2025-04-08 NOTE — CONSULTS
[facility]    PATIENT'S NAME: MARCO A DELGADILLO   ATTENDING PHYSICIAN: Mark Santos MD   CONSULTING PHYSICIAN: Albaro Harmon MD   PATIENT ACCOUNT#:   [id number]    LOCATION:  [facility] PACU 8 [facility] 10  MEDICAL RECORD #:   [id number]       YOB: 1990  ADMISSION DATE:       04/08/2025      CONSULT DATE:  04/08/2025    REPORT OF CONSULTATION      REASON FOR ADMISSION:  Post left percutaneous nephrolithotomy.    HISTORY OF PRESENT ILLNESS:  The patient is a 34-year-old  female with recurrent left flank pain and urinary tract infections.  Imaging studies showed left kidney staghorn calculus 20 x 13 mm.  Evaluated by Urology and scheduled today for above-mentioned procedure.  Preoperatively had left percutaneous nephrostomy tube by Interventional Radiology.  Postoperatively transferred to PACU for further monitoring.    PAST MEDICAL HISTORY:  Staghorn calculus as outlined above.    PAST SURGICAL HISTORY:  Cystoscopy and lithotripsy, D and C procedure, and tubal ligation.    MEDICATIONS:  Please see medication reconciliation list.     ALLERGIES:  No known drug allergies.     SOCIAL HISTORY:  No tobacco, alcohol, or drug use.  Independent for basic activities of daily living.    FAMILY HISTORY:  Positive for diabetes mellitus type 2.    REVIEW OF SYSTEMS:  Currently resting in bed.  Left flank discomfort.  No chest pain.  No shortness of breath.  Other 12-point review of systems negative.      PHYSICAL EXAMINATION:    GENERAL:  Alert, oriented to time, place, and person, mild distress.   VITAL SIGNS:  Temperature 97.0.  Pulse 99.  Respiratory rate 17.  Blood pressure 133/108.  Pulse ox 96% on room air.  HEENT:  Atraumatic.  Oropharynx clear.  Dry mucous membranes.  Ears, nose normal.  Eyes:  Anicteric sclerae.   NECK:  Supple.  No lymphadenopathy.  Trachea midline.  LUNGS:  Clear to auscultation bilaterally.  Normal respiratory effort.  HEART:  Regular rate and rhythm.  S1 and S2  auscultated.  No murmur.  ABDOMEN:  Soft, nondistended.  Left flank nephrostomy tube and Godoy catheter noted with bloody urine in both collecting bags.  EXTREMITIES:  No peripheral edema, clubbing, or cyanosis.  NEUROLOGIC:  Motor and sensory intact.    ASSESSMENT AND PLAN:  Left staghorn calculus status post left nephrostomy tube and left percutaneous nephrolithotomy.  Preoperative gentamicin and Ancef.  Continue to monitor temperature curve.  Pain control.  Monitor hemodynamic status.    Dictated By Albaro Harmon MD  d: 04/08/2025 13:17:55  t: 04/08/2025 13:22:50  [facility] [id number]  FB/

## 2025-04-09 ENCOUNTER — TELEPHONE (OUTPATIENT)
Dept: SURGERY | Facility: CLINIC | Age: 35
End: 2025-04-09

## 2025-04-09 ENCOUNTER — APPOINTMENT (OUTPATIENT)
Dept: CT IMAGING | Facility: HOSPITAL | Age: 35
End: 2025-04-09
Payer: COMMERCIAL

## 2025-04-09 VITALS
DIASTOLIC BLOOD PRESSURE: 48 MMHG | TEMPERATURE: 99 F | WEIGHT: 194.38 LBS | BODY MASS INDEX: 38.16 KG/M2 | OXYGEN SATURATION: 98 % | SYSTOLIC BLOOD PRESSURE: 98 MMHG | RESPIRATION RATE: 16 BRPM | HEIGHT: 60 IN | HEART RATE: 98 BPM

## 2025-04-09 LAB
ANION GAP SERPL CALC-SCNC: 8 MMOL/L (ref 0–18)
BASOPHILS # BLD AUTO: 0.01 X10(3) UL (ref 0–0.2)
BASOPHILS NFR BLD AUTO: 0.1 %
BUN BLD-MCNC: 7 MG/DL (ref 9–23)
BUN/CREAT SERPL: 10.9 (ref 10–20)
CALCIUM BLD-MCNC: 7.6 MG/DL (ref 8.7–10.4)
CHLORIDE SERPL-SCNC: 108 MMOL/L (ref 98–112)
CO2 SERPL-SCNC: 22 MMOL/L (ref 21–32)
CREAT BLD-MCNC: 0.64 MG/DL (ref 0.55–1.02)
DEPRECATED RDW RBC AUTO: 45.8 FL (ref 35.1–46.3)
EGFRCR SERPLBLD CKD-EPI 2021: 118 ML/MIN/1.73M2 (ref 60–?)
EOSINOPHIL # BLD AUTO: 0.04 X10(3) UL (ref 0–0.7)
EOSINOPHIL NFR BLD AUTO: 0.3 %
ERYTHROCYTE [DISTWIDTH] IN BLOOD BY AUTOMATED COUNT: 13.6 % (ref 11–15)
GLUCOSE BLD-MCNC: 89 MG/DL (ref 70–99)
HCT VFR BLD AUTO: 32.1 % (ref 35–48)
HGB BLD-MCNC: 10.8 G/DL (ref 12–16)
IMM GRANULOCYTES # BLD AUTO: 0.05 X10(3) UL (ref 0–1)
IMM GRANULOCYTES NFR BLD: 0.4 %
LYMPHOCYTES # BLD AUTO: 1.98 X10(3) UL (ref 1–4)
LYMPHOCYTES NFR BLD AUTO: 15.9 %
MCH RBC QN AUTO: 30.9 PG (ref 26–34)
MCHC RBC AUTO-ENTMCNC: 33.6 G/DL (ref 31–37)
MCV RBC AUTO: 92 FL (ref 80–100)
MONOCYTES # BLD AUTO: 0.69 X10(3) UL (ref 0.1–1)
MONOCYTES NFR BLD AUTO: 5.5 %
NEUTROPHILS # BLD AUTO: 9.7 X10 (3) UL (ref 1.5–7.7)
NEUTROPHILS # BLD AUTO: 9.7 X10(3) UL (ref 1.5–7.7)
NEUTROPHILS NFR BLD AUTO: 77.8 %
OSMOLALITY SERPL CALC.SUM OF ELEC: 283 MOSM/KG (ref 275–295)
PLATELET # BLD AUTO: 215 10(3)UL (ref 150–450)
POTASSIUM SERPL-SCNC: 3.8 MMOL/L (ref 3.5–5.1)
RBC # BLD AUTO: 3.49 X10(6)UL (ref 3.8–5.3)
SODIUM SERPL-SCNC: 138 MMOL/L (ref 136–145)
WBC # BLD AUTO: 12.5 X10(3) UL (ref 4–11)

## 2025-04-09 PROCEDURE — 99239 HOSP IP/OBS DSCHRG MGMT >30: CPT | Performed by: STUDENT IN AN ORGANIZED HEALTH CARE EDUCATION/TRAINING PROGRAM

## 2025-04-09 PROCEDURE — 74176 CT ABD & PELVIS W/O CONTRAST: CPT

## 2025-04-09 RX ORDER — PHENAZOPYRIDINE HYDROCHLORIDE 200 MG/1
200 TABLET, FILM COATED ORAL 3 TIMES DAILY PRN
Qty: 10 TABLET | Refills: 0 | Status: SHIPPED | OUTPATIENT
Start: 2025-04-09

## 2025-04-09 RX ORDER — SENNA AND DOCUSATE SODIUM 50; 8.6 MG/1; MG/1
2 TABLET, FILM COATED ORAL NIGHTLY PRN
Qty: 14 TABLET | Refills: 0 | Status: SHIPPED | OUTPATIENT
Start: 2025-04-09

## 2025-04-09 RX ORDER — HYDROCODONE BITARTRATE AND ACETAMINOPHEN 5; 325 MG/1; MG/1
1-2 TABLET ORAL EVERY 6 HOURS PRN
Qty: 30 TABLET | Refills: 0 | Status: SHIPPED | OUTPATIENT
Start: 2025-04-09

## 2025-04-09 RX ADMIN — SODIUM CHLORIDE: 9 INJECTION, SOLUTION INTRAVENOUS at 08:51:00

## 2025-04-09 RX ADMIN — ACETAMINOPHEN 1000 MG: 10 INJECTION, SOLUTION INTRAVENOUS at 05:26:00

## 2025-04-09 RX ADMIN — DOCUSATE SODIUM 100 MG: 100 CAPSULE, LIQUID FILLED ORAL at 08:43:00

## 2025-04-09 RX ADMIN — OXYCODONE HYDROCHLORIDE 10 MG: 5 TABLET ORAL at 01:03:00

## 2025-04-09 RX ADMIN — ACETAMINOPHEN 1000 MG: 10 INJECTION, SOLUTION INTRAVENOUS at 17:16:00

## 2025-04-09 RX ADMIN — HYDROMORPHONE HYDROCHLORIDE 0.5 MG: 1 INJECTION, SOLUTION INTRAMUSCULAR; INTRAVENOUS; SUBCUTANEOUS at 03:48:00

## 2025-04-09 NOTE — PLAN OF CARE
Patient A/ox4. Room air. Tolerates low fiber diet well. Tylenol for pain. Nephrostomy tube and Godoy catheter removed. Voids freely with slight pink urine. Ambulates on halls. Dressing changed with minimum output. Cleared for discharge tonight.   Problem: Patient Centered Care  Goal: Patient preferences are identified and integrated in the patient's plan of care  Description: Interventions:- What would you like us to know as we care for you? I live at home with my spouse and 3 kiddos. - Provide timely, complete, and accurate information to patient/family- Incorporate patient and family knowledge, values, beliefs, and cultural backgrounds into the planning and delivery of care- Encourage patient/family to participate in care and decision-making at the level they choose- Honor patient and family perspectives and choices  Outcome: Progressing     Problem: Patient/Family Goals  Goal: Patient/Family Long Term Goal  Description: Patient's Long Term Goal: Discharge without complications  Interventions:  - Work with treatment team to develop plan of care  - Utilize medications as appropriate, take prescribed medications as instructed  - Continue following up post discharge as instructed with appropriate physicians   - See additional Care Plan goals for specific interventions  Outcome: Progressing  Goal: Patient/Family Short Term Goal  Description: Patient's Short Term Goal: Pain Control   Interventions:   - Work with treatment team to develop pain control plan  - Utilize PRN medications appropriately  - Use non-pharmacological pain control techniques (deep breathing, guided-imagery, heat/ice)  - See additional Care Plan goals for specific interventions  Outcome: Progressing     Problem: PAIN - ADULT  Goal: Verbalizes/displays adequate comfort level or patient's stated pain goal  Description: INTERVENTIONS:- Encourage pt to monitor pain and request assistance- Assess pain using appropriate pain scale- Administer analgesics  based on type and severity of pain and evaluate response- Implement non-pharmacological measures as appropriate and evaluate response- Consider cultural and social influences on pain and pain management- Manage/alleviate anxiety- Utilize distraction and/or relaxation techniques- Monitor for opioid side effects- Notify MD/LIP if interventions unsuccessful or patient reports new pain- Anticipate increased pain with activity and pre-medicate as appropriate  Outcome: Progressing     Problem: RISK FOR INFECTION - ADULT  Goal: Absence of fever/infection during anticipated neutropenic period  Description: INTERVENTIONS- Monitor WBC- Administer growth factors as ordered- Implement neutropenic guidelines  Outcome: Progressing     Problem: SAFETY ADULT - FALL  Goal: Free from fall injury  Description: INTERVENTIONS:- Assess pt frequently for physical needs- Identify cognitive and physical deficits and behaviors that affect risk of falls.- [place] fall precautions as indicated by assessment.- Educate pt/family on patient safety including physical limitations- Instruct pt to call for assistance with activity based on assessment- Modify environment to reduce risk of injury- Provide assistive devices as appropriate- Consider OT/PT consult to assist with strengthening/mobility- Encourage toileting schedule  Outcome: Progressing     Problem: DISCHARGE PLANNING  Goal: Discharge to home or other facility with appropriate resources  Description: INTERVENTIONS:- Identify barriers to discharge w/pt and caregiver- Include patient/family/discharge partner in discharge planning- Arrange for needed discharge resources and transportation as appropriate- Identify discharge learning needs (meds, wound care, etc)- Arrange for interpreters to assist at discharge as needed- Consider post-discharge preferences of patient/family/discharge partner- Complete POLST form as appropriate- Assess patient's ability to be responsible for managing their  own health- Refer to Case Management Department for coordinating discharge planning if the patient needs post-hospital services based on physician/LIP order or complex needs related to functional status, cognitive ability or social support system  Outcome: Progressing

## 2025-04-09 NOTE — PROGRESS NOTES
[facility]  part of [facility]    Urology Progress Note    Karina Gil Patient Status:  Inpatient    12/3/1990 MRN [id number]   Location [facility] 4W/SW/SE Attending Mark Santos MD   Hosp Day # 1 PCP YASEMIN PEREZ MD        Subjective:   Pt is resting comfortably in bed. Afebrile, VSS. States pain is tolerable to left flank. Left nephrostomy is draining pink without clots, dressing is dry and intact with shadow of old drainage. Godoy is draining yellow urine with sediment but without clots. Tolerating diet without nausea. Has not ambuated since surgery. Has not been using IS. No c/o chest pain or difficulty breathing.    Cr 0.64  WBC 17.4 -> 12.5  Hgb 12.9 -> 10.8      Objective:     24hr Min/Max:  Temp  Min: 97 °F (36.1 °C)  Max: 99.2 °F (37.3 °C)  Pulse  Min: 56  Max: 99  BP  Min: 104/50  Max: 143/92  Resp  Min: 10  Max: 22  SpO2  Min: 94 %  Max: 100 %    Most Recent :    Vitals:    25 0855   BP: 94/68   Pulse: 97   Resp: 16   Temp: 98.5 °F (36.9 °C)     [unfilled]  I/O this shift:  In: 360 [P.O.:360]  Out: 440 [Urine:440]    GENERAL APPEARANCE: well developed, well nourished, in no acute distress  EYES: sclera non-icteric  ORAL CAVITY: mucosa moist  NECK/THYROID: no obvious masses or goiter  CV/RESP: non-labored breathing  ABDOMEN: soft, left flank appropriately TTP, nephrostomy draining pink urine  : Godoy draining yellow urine with sediment and without clots  EXTREMITIES: warm, well perfused, no clubbing, cyanosis or edema          Results:     Lab Results   Component Value Date    WBC 12.5 (H) 2025    HGB 10.8 (L) 2025    HCT 32.1 (L) 2025    .0 2025    CREATSERUM 0.64 2025    BUN 7 (L) 2025     2025    K 3.8 2025     2025    CO2 22.0 2025    GLU 89 2025    CA 7.6 (L) 2025    ALB 3.4 2022    ALKPHO 81 2022    BILT 0.3 2022    TP 7.3 2022    AST  23 09/04/2022    ALT 29 09/04/2022    PTT 31.5 03/29/2025    INR 0.93 03/29/2025    T4F 0.9 01/08/2022    TSH 2.360 09/04/2022     08/18/2019    DDIMER 0.48 05/24/2020    CRP 9.76 (H) 05/24/2020    TROP <0.045 05/22/2020    CK 21 (L) 05/22/2020             Assessment and Plan:     Pt is a 34 year old female who is POD 1 status-post left percutaneous nephrolithotomy (PCNL), 3 centimeters in total, left antegrade pyelogram, left flexible nephroureteroscopy, and placement of left nephrostomy tube, hager catheter and double-J ureteral stent with Dr Santos. Doing well.     Nephrostomy clamped and balloon taken down.    Recommendations:  - Encourage use of IS and ambulation  - Advance diet as tolerated.  - Decrease IVF to 75 mL/hr. Saline lock when tolerating diet  - Stat CT Abd+Pelvis to assess stone burden    All questions answered. Patient verbalizes understanding and agrees with plan.    ANALY Pizano  [facility] Urology  4/9/2025

## 2025-04-09 NOTE — DISCHARGE INSTRUCTIONS
Percutaneous Nephrolithotripsy (PCNL) Discharge Instructions    Hospital stay: Patients typically stay overnight in the hospital and go home the next day.     Diet:  - After anesthesia, begin with clear liquids. You may take what you like to eat or drink. Depending on how you feel the following day, you may resume your normal diet. The appetite may be diminished the first several days at home. Avoid heavy meals.   - Drink 6 to 8 glasses of water/fluids a day unless your healthcare provider tells you to limit your fluids.  - Expect bloody urine and possibly clots for up to 4-6 weeks following the procedure.    Wound Care:  - The small incision on your flank will drain some blood and urine for the next few days. This is completely normal. You may need to change your dressing every 1-2 hours for the first couple days. The drainage will decrease over the first few days at home.  - When you are not ambulating I recommend you put slight pressure on your flank by leaning back in your chair or in bed. This mild pressure should help the flank drainage to stop faster.    Activity:  - Be sure to walk at least six times per day. This helps prevents blood clots in the legs, which can travel to the lung and become life-threatening. You may take walks outside. You may go up and down stairs.  - You may tire easily with minimal activity. Your incision will be tender and you will have some degree of pain for 3-4 weeks.  - You should avoid strenuous activity for ~ 6 weeks. This includes activities such as golf, tennis, cutting the grass, stretching exercises, lifting weights and so forth. You should avoid carrying anything over fifteen pounds for this six week period.  - Don’t drive until you are off your pain medicine and pain-free. This may take 2 to 4 weeks. Avoid long car rides for 3-4 weeks (you have a slightly higher risk of developing blood clots immediately following surgery).    Showering: You should shower once daily  starting the day after discharge. Allow the water to run over the incision then pat dry. Avoid submerging in water (no baths, jacuzzi, pools, or soaking) for four weeks. You do not need to place anything over the incision once drainage stops but may place a gauze or bandaid if there is oozing or spotting.     Medication:   - If you take blood thinners (such as aspirin or plavix) please DO NOT take them when you go home. You may resume these medications in 2-4 weeks.  - You can obtain good pain relief by taking two acetaminophen (Tylenol) every four hours while awake for the first several days. You will also get a prescription for pain pills. This narcotic pain medication may also contain acetaminophen. Do not exceed 3000 mg acetaminophen per day.  - You may receive a prescription for a stool softener to avoid straining after surgery. Take plenty of fiber and water or over the counter stool softener to avoid constipation. It may take a few days to have your first bowel movement after surgery. You may use two tabs of over the counter Senekot or Senegen twice daily as needed for constipation.     Postoperative appointment: You will need a postoperative visit within a few weeks after your discharge. Call the office to make an appointment if you do not already have one.     Call the office or come to the emergency room for fever over 101°F, difficulty breathing, chest pain, palpitations, significant nausea or vomiting, leg swelling or pain.

## 2025-04-09 NOTE — PLAN OF CARE
Patient is AxO4. POD 1. Dressing to left nephrostomy tube is clean/dry/intact with stable old drainage. Left nephrostomy tube and hager in place draining pink urine with no clots observed. Tolerating diet. Advanced to LF/S diet to trial this morning. Denies nausea/vomiting overnight. Surgical pain managed with Ofirmev, oxy, and dilaudid IVP x1 for break through pain. Eye patch in place to left eye, receiving eye drops QID.  Receiving IV abx and IVF. No BM overnight. Appropriate safety measures maintained, call light within reach, and frequent rounding.           Problem: Patient Centered Care  Goal: Patient preferences are identified and integrated in the patient's plan of care  Description: Interventions:- What would you like us to know as we care for you? I live at home with my spouse and 3 kiddos. - Provide timely, complete, and accurate information to patient/family- Incorporate patient and family knowledge, values, beliefs, and cultural backgrounds into the planning and delivery of care- Encourage patient/family to participate in care and decision-making at the level they choose- Honor patient and family perspectives and choices  Outcome: Progressing     Problem: Patient/Family Goals  Goal: Patient/Family Long Term Goal  Description: Patient's Long Term Goal: Discharge without complications  Interventions:  - Work with treatment team to develop plan of care  - Utilize medications as appropriate, take prescribed medications as instructed  - Continue following up post discharge as instructed with appropriate physicians   - See additional Care Plan goals for specific interventions  Outcome: Progressing  Goal: Patient/Family Short Term Goal  Description: Patient's Short Term Goal: Pain Control   Interventions:   - Work with treatment team to develop pain control plan  - Utilize PRN medications appropriately  - Use non-pharmacological pain control techniques (deep breathing, guided-imagery, heat/ice)  - See  additional Care Plan goals for specific interventions  Outcome: Progressing     Problem: PAIN - ADULT  Goal: Verbalizes/displays adequate comfort level or patient's stated pain goal  Description: INTERVENTIONS:- Encourage pt to monitor pain and request assistance- Assess pain using appropriate pain scale- Administer analgesics based on type and severity of pain and evaluate response- Implement non-pharmacological measures as appropriate and evaluate response- Consider cultural and social influences on pain and pain management- Manage/alleviate anxiety- Utilize distraction and/or relaxation techniques- Monitor for opioid side effects- Notify MD/LIP if interventions unsuccessful or patient reports new pain- Anticipate increased pain with activity and pre-medicate as appropriate  Outcome: Progressing     Problem: RISK FOR INFECTION - ADULT  Goal: Absence of fever/infection during anticipated neutropenic period  Description: INTERVENTIONS- Monitor WBC- Administer growth factors as ordered- Implement neutropenic guidelines  Outcome: Progressing     Problem: SAFETY ADULT - FALL  Goal: Free from fall injury  Description: INTERVENTIONS:- Assess pt frequently for physical needs- Identify cognitive and physical deficits and behaviors that affect risk of falls.- [place] fall precautions as indicated by assessment.- Educate pt/family on patient safety including physical limitations- Instruct pt to call for assistance with activity based on assessment- Modify environment to reduce risk of injury- Provide assistive devices as appropriate- Consider OT/PT consult to assist with strengthening/mobility- Encourage toileting schedule  Outcome: Progressing     Problem: DISCHARGE PLANNING  Goal: Discharge to home or other facility with appropriate resources  Description: INTERVENTIONS:- Identify barriers to discharge w/pt and caregiver- Include patient/family/discharge partner in discharge planning- Arrange for needed discharge  resources and transportation as appropriate- Identify discharge learning needs (meds, wound care, etc)- Arrange for interpreters to assist at discharge as needed- Consider post-discharge preferences of patient/family/discharge partner- Complete POLST form as appropriate- Assess patient's ability to be responsible for managing their own health- Refer to Case Management Department for coordinating discharge planning if the patient needs post-hospital services based on physician/LIP order or complex needs related to functional status, cognitive ability or social support system  Outcome: Progressing

## 2025-04-09 NOTE — PLAN OF CARE
Patient alert and oriented, Yoruba speaking, family at bedside. Dressing to Left nephrostomy with some post-op drainage, minimal and stable. Nephrostomy and hager both draining red urine, free of clots, lightening as shift continues. SCDs for DVT prophylaxis. IV fluids and antibiotics infusing. Tolerating full liquid diet, transitioned to soft for AM meal. Accucheck upon admission to floor. Pain managed with PRN Oxycodone x 1. Call light within reach, fall precautions in place, patient calling appropriately.     Problem: Patient Centered Care  Goal: Patient preferences are identified and integrated in the patient's plan of care  Description: Interventions:- What would you like us to know as we care for you? I live at home with my spouse and 3 kiddos. - Provide timely, complete, and accurate information to patient/family- Incorporate patient and family knowledge, values, beliefs, and cultural backgrounds into the planning and delivery of care- Encourage patient/family to participate in care and decision-making at the level they choose- Honor patient and family perspectives and choices  Outcome: Progressing     Problem: Patient/Family Goals  Goal: Patient/Family Long Term Goal  Description: Patient's Long Term Goal: Discharge without complications  Interventions:  - Work with treatment team to develop plan of care  - Utilize medications as appropriate, take prescribed medications as instructed  - Continue following up post discharge as instructed with appropriate physicians   - See additional Care Plan goals for specific interventions  Outcome: Progressing  Goal: Patient/Family Short Term Goal  Description: Patient's Short Term Goal: Pain Control   Interventions:   - Work with treatment team to develop pain control plan  - Utilize PRN medications appropriately  - Use non-pharmacological pain control techniques (deep breathing, guided-imagery, heat/ice)  - See additional Care Plan goals for specific  interventions  Outcome: Progressing     Problem: PAIN - ADULT  Goal: Verbalizes/displays adequate comfort level or patient's stated pain goal  Description: INTERVENTIONS:- Encourage pt to monitor pain and request assistance- Assess pain using appropriate pain scale- Administer analgesics based on type and severity of pain and evaluate response- Implement non-pharmacological measures as appropriate and evaluate response- Consider cultural and social influences on pain and pain management- Manage/alleviate anxiety- Utilize distraction and/or relaxation techniques- Monitor for opioid side effects- Notify MD/LIP if interventions unsuccessful or patient reports new pain- Anticipate increased pain with activity and pre-medicate as appropriate  Outcome: Progressing     Problem: RISK FOR INFECTION - ADULT  Goal: Absence of fever/infection during anticipated neutropenic period  Description: INTERVENTIONS- Monitor WBC- Administer growth factors as ordered- Implement neutropenic guidelines  Outcome: Progressing     Problem: SAFETY ADULT - FALL  Goal: Free from fall injury  Description: INTERVENTIONS:- Assess pt frequently for physical needs- Identify cognitive and physical deficits and behaviors that affect risk of falls.- [place] fall precautions as indicated by assessment.- Educate pt/family on patient safety including physical limitations- Instruct pt to call for assistance with activity based on assessment- Modify environment to reduce risk of injury- Provide assistive devices as appropriate- Consider OT/PT consult to assist with strengthening/mobility- Encourage toileting schedule  Outcome: Progressing     Problem: DISCHARGE PLANNING  Goal: Discharge to home or other facility with appropriate resources  Description: INTERVENTIONS:- Identify barriers to discharge w/pt and caregiver- Include patient/family/discharge partner in discharge planning- Arrange for needed discharge resources and transportation as appropriate-  Identify discharge learning needs (meds, wound care, etc)- Arrange for interpreters to assist at discharge as needed- Consider post-discharge preferences of patient/family/discharge partner- Complete POLST form as appropriate- Assess patient's ability to be responsible for managing their own health- Refer to Case Management Department for coordinating discharge planning if the patient needs post-hospital services based on physician/LIP order or complex needs related to functional status, cognitive ability or social support system  Outcome: Progressing

## 2025-04-09 NOTE — TELEPHONE ENCOUNTER
Can we get this patient set up for a cysto with stent removal in 2-3 weeks with Dr Santos?   Initiate Treatment: Isotretinoin 30mg BID Detail Level: Zone Plan: No waxing , threading, or laser hair procedures until course is through\\n\\nMost recent Labs still preliminary, RX will be sent when labs are final and reviewed. Discontinue Regimen: Clindamycin \\nTretinoin \\nSpironolactone \\nDoxycycline

## 2025-04-09 NOTE — DISCHARGE SUMMARY
[facility]  part of [facility]    Discharge Summary    Karina Gil Patient Status:  Inpatient    12/3/1990 MRN [id number]   Location [facility] 4W/SW/SE Attending Chelsi Fraire MD   Hosp Day # 1 PCP YASEMIN PEREZ MD     Date of Admission: 2025   Date of Discharge: 25     Admitting Diagnosis: Kidney stone on left side [N20.0]  Staghorn renal calculus [N20.0]    Disposition: Home    Discharge Diagnosis: .Principal Problem:    Kidney stone on left side      Hospital Course:   Reason for Admission: scheduled left percutaneous nephrolithotomy       Discharge Physical Exam:   GENERAL:  Alert, oriented to time, place, and person, mild distress.   HEENT:  Atraumatic.  Oropharynx clear.    LUNGS:  Clear to auscultation bilaterally.  Normal respiratory effort.  HEART:  Regular rate and rhythm.  S1 and S2 auscultated.  No murmur.  ABDOMEN:  Soft, nondistended.    EXTREMITIES:  No peripheral edema, clubbing, or cyanosis.  NEUROLOGIC:  Motor and sensory intact.        Hospital Course:   The patient is a 34-year-old  female with recurrent left flank pain and urinary tract infections.  Imaging studies showed left kidney staghorn calculus 20 x 13 mm.  Evaluated by Urology and admitted for scheduled urologic procedure. On 25 preoperatively had left percutaneous nephrostomy tube by Interventional Radiology Dr. EVER Jhonson, and then Left Percutaneous nephrolithotomy with double-J ureteral stent by urology Dr. ELBA Santos.   Tolerated procedure well with no complications. Postop hospital course uneventful. Tolerated diet without nausea or vomiting.  Pain controlled. Labs, vital signs, and outputs reviewed.  Left nephrostomy tube was draining light pink urine, no clots.     CT abdomen pelvis without contrast  showed resolution of previous nonobstructing staghorn morphology left pole calculus. No suspicious calculus fragments along the course of the nephrostomy tube or nephroureteral  stent.  Since CT Abd/pelvis reassuring, both Nephrostomy tube and hager catheter removed. Patient successfully passed voiding trial. Cleared from urology   Advised for mobility at home, IS use. General diet as tolerated.   Discharge instructions provided with plan for urology office cystoscopy and stent removal in about 2-3 weeks.   ER precautions reviewed.       Complications: None      Surgical Procedures       Case IDs Date Procedure Surgeon Location Status    [id number] 4/8/25 Left percutaneous nephrolithotomy, cystoscopy, left stent placement Mark Santos MD [facility] MAIN OR Comp          Pending Labs       Order Current Status    Calculi, Urinary In process            Discharge Plan:   Discharge Condition: Stable    Current Discharge Medication List        New Orders    Details   HYDROcodone-acetaminophen 5-325 MG Oral Tab Take 1-2 tablets by mouth every 6 (six) hours as needed for Pain.      senna-docusate 8.6-50 MG Oral Tab Take 2 tablets by mouth nightly as needed for constipation.      phenazopyridine (PYRIDIUM) 200 MG Oral Tab Take 1 tablet (200 mg total) by mouth 3 (three) times daily as needed for Pain.           Home Meds - Unchanged    Details   Phentermine HCl 30 MG Oral Cap Take 1 capsule (30 mg total) by mouth every morning.                 Discharge Diet: As tolerated    Discharge Activity: As tolerated    Follow up:      Follow-up Information       Mark Santos MD Follow up.    Specialty: UROLOGY  Why: 2-3 weeks for cystoscopy with stent removal in the office. Our office should be reaching out to help you schedule that.  Contact information:  [address]  [facility] [id number] [phone]               Maurice Rodas MD. Go in 2 day(s).    Specialty: Family Medicine  Why: For hospital discharge follow up.  Contact information:  [address]  [facility] [address] [phone]                                   Other Discharge Instructions:         Percutaneous  Nephrolithotripsy (PCNL) Discharge Instructions    Hospital stay: Patients typically stay overnight in the hospital and go home the next day.     Diet:  - After anesthesia, begin with clear liquids. You may take what you like to eat or drink. Depending on how you feel the following day, you may resume your normal diet. The appetite may be diminished the first several days at home. Avoid heavy meals.   - Drink 6 to 8 glasses of water/fluids a day unless your healthcare provider tells you to limit your fluids.  - Expect bloody urine and possibly clots for up to 4-6 weeks following the procedure.    Wound Care:  - The small incision on your flank will drain some blood and urine for the next few days. This is completely normal. You may need to change your dressing every 1-2 hours for the first couple days. The drainage will decrease over the first few days at home.  - When you are not ambulating I recommend you put slight pressure on your flank by leaning back in your chair or in bed. This mild pressure should help the flank drainage to stop faster.    Activity:  - Be sure to walk at least six times per day. This helps prevents blood clots in the legs, which can travel to the lung and become life-threatening. You may take walks outside. You may go up and down stairs.  - You may tire easily with minimal activity. Your incision will be tender and you will have some degree of pain for 3-4 weeks.  - You should avoid strenuous activity for ~ 6 weeks. This includes activities such as golf, tennis, cutting the grass, stretching exercises, lifting weights and so forth. You should avoid carrying anything over fifteen pounds for this six week period.  - Don’t drive until you are off your pain medicine and pain-free. This may take 2 to 4 weeks. Avoid long car rides for 3-4 weeks (you have a slightly higher risk of developing blood clots immediately following surgery).    Showering: You should shower once daily starting the day  after discharge. Allow the water to run over the incision then pat dry. Avoid submerging in water (no baths, jacuzzi, pools, or soaking) for four weeks. You do not need to place anything over the incision once drainage stops but may place a gauze or bandaid if there is oozing or spotting.     Medication:   - If you take blood thinners (such as aspirin or plavix) please DO NOT take them when you go home. You may resume these medications in 2-4 weeks.  - You can obtain good pain relief by taking two acetaminophen (Tylenol) every four hours while awake for the first several days. You will also get a prescription for pain pills. This narcotic pain medication may also contain acetaminophen. Do not exceed 3000 mg acetaminophen per day.  - You may receive a prescription for a stool softener to avoid straining after surgery. Take plenty of fiber and water or over the counter stool softener to avoid constipation. It may take a few days to have your first bowel movement after surgery. You may use two tabs of over the counter Senekot or Senegen twice daily as needed for constipation.     Postoperative appointment: You will need a postoperative visit within a few weeks after your discharge. Call the office to make an appointment if you do not already have one.     Call the office or come to the emergency room for fever over 101°F, difficulty breathing, chest pain, palpitations, significant nausea or vomiting, leg swelling or pain.          >30 minutes spent on discharge orders, coordination, and planning.     Chelsi Fraire MD  4/9/2025

## 2025-04-09 NOTE — PAYOR COMM NOTE
--------------  ADMISSION REVIEW     Payor: [facility]  Subscriber #:  [id number]  Authorization Number: [id number]    Admit date: 25  Admit time:  4:34 PM           History and Physical    H&P signed by Kody Johnson MD at 2025  2:31 PM    History & Physical Examination     Patient Name: Karina Gil  MRN: [id number]  CSN: [id number]  YOB: 1990     Diagnosis: nephrolithiasis              Current Facility-Administered Medications   Medication Dose Route Frequency    sodium chloride 0.9% infusion   Intravenous Continuous    acetaminophen (Tylenol Extra Strength) tab 1,000 mg  1,000 mg Oral Once    famotidine (Pepcid) tab 20 mg  20 mg Oral Once     Or    famotidine (Pepcid) 20 mg/2mL injection 20 mg  20 mg Intravenous Once    metoclopramide (Reglan) tab 10 mg  10 mg Oral Once     Or    metoclopramide (Reglan) 5 mg/mL injection 10 mg  10 mg Intravenous Once    cefTRIAXone (Rocephin) 2 g in sodium chloride 0.9% 100 mL IVPB-ADDV  2 g Intravenous Once    sodium chloride 0.9% infusion   Intravenous Continuous    gentamicin (Garamycin) 300 mg in sodium chloride 0.9% 100 mL IVPB  5 mg/kg (Adjusted) Intravenous Once              Past Medical History:    Abnormal uterine bleeding    Amenorrhea    BMI 37.0-37.9, adult    Calculus of kidney    Decorative tattoo    Disorder of thyroid    Hormone disorder     Per pt had imbalance of progesterone     Hyperlipidemia     diet controlled     Hypothyroid    Kidney stones     x2 on the L side    Miscarriage (HCC)    Pneumonia due to organism     labor (HCC)    Subclinical iodine-deficiency hypothyroidism           [ x ] I have discussed the risks and benefits and alternatives with the patient/family.  They understand and agree to proceed with plan of care.  [ x ] I have reviewed the History and Physical done within the last 30 days.  Any changes noted above.     Kody Johnson MD  2025  9:12 AM            Signed by Kody Johnson MD on 2025  2:31 PM      H&P signed by Mark Santos MD at 2025 10:23 AM    History & Physical Examination     Patient Name: Karina Gil  MRN: [id number]  [facility]: [id number]  YOB: 1990     Diagnosis: Left kidney stone.      Present Illness: 34 year old female with bilateral nephrolithiasis.  Index stone includes a 3 x 2.1 cm right upper pole staghorn configuration kidney stone.     Different stone management options were discussed including watchful waiting, ESWL with pre-stenting, ureteroscopy with laser lithotripsy, and percutaneous nephrolithotomy.       Rationale and approach as well as benefits, risks, possible complications and reasonable alternatives of each treatment were discussed with the patient.  Stone clearance rates were discussed as well as the expected postoperative course of recovery. We discussed the eventual need for stent removal which is usually performed in the office setting. We discussed risks of surgery including but not limited to medical and anesthetic complications, bleeding potentially requiring transfusion, infection, damage to surrounding organs or structures, pneumothorax, ureteral injury, stricture formation, and possible need for additional procedures or staged approach. Patient verbalized understanding. All questions were answered. She would like to proceed with a left percutaneous nephrolithotomy (PCNL).     Allergies: [Allergies]    [Allergies]    No Known Allergies           Past Medical History:    Abnormal uterine bleeding    Amenorrhea    BMI 37.0-37.9, adult    Calculus of kidney    Decorative tattoo    Disorder of thyroid    Hormone disorder     Per pt had imbalance of progesterone     Hyperlipidemia     diet controlled     Hypothyroid    Kidney stones     x2 on the L side    Miscarriage (HCC)    Pneumonia due to organism     labor (HCC)    Subclinical iodine-deficiency hypothyroidism         [ x ] I have discussed the risks and benefits and alternatives with the  patient/family.  They understand and agree to proceed with plan of care.  [ x ] I have reviewed the History and Physical done within the last 30 days.  Any changes noted above.     Mark Santos MD  4/8/2025 10:19 AM          Signed by Mark Santos MD on 4/8/2025 10:23 AM       OR REPORT     Operative Report signed by Mark Santos MD at 4/8/2025  1:51 PM    Author: Mark Santos MD Service: Urology Author Type: Physician   Filed: 4/8/2025  1:51 PM Date of Service: 4/8/2025 10:32 AM Status: Signed   : Mark Santos MD (Physician)     [facility]  part of [facility]  Urology Operative Note                Karina Gil Location: OR   [facility] [id number] MRN [id number]   Admission Date 4/8/2025 Operation Date 4/8/2025   Attending Physician Mark Santos MD          Patient Name: Karina Gil      Preoperative Diagnosis: Kidney stone on left side [N20.0]        Postoperative Diagnosis: Kidney stone on left side [N20.0]     Procedure(s):  Left Percutaneous nephrolithotomy (PCNL), 3 centimeters in total.  Left Antegrade pyelogram  Left flexible nephroureteroscopy  Placement of left nephrostomy tube, Godoy catheter and double-J ureteral stent     Primary Surgeon: Mark Santos MD      Anesthesia: General ETT.     Specimen:   ID Type Source Tests Collected by Time Destination   1 : 1. left kidney stone fragments Calculus Kidney stone (calculus) CALCULI, URINARY, SURGICAL PATHOLOGY TISSUE Mark Santos MD 4/8/2025 12:28 PM           Estimated Blood Loss: 20 mL.   Complications: None.       Indications for procedure: 34 year old female with bilateral nephrolithiasis.  Index stone includes a 3 x 2.1 cm right upper pole staghorn configuration kidney stone. Different stone management options were discussed including watchful waiting, ESWL with pre-stenting, ureteroscopy with laser lithotripsy, and percutaneous nephrolithotomy.  Rationale and approach as well as benefits,  risks, possible complications and reasonable alternatives of each treatment were discussed with the patient.  Stone clearance rates were discussed as well as the expected postoperative course of recovery. We discussed the eventual need for stent removal which is usually performed in the office setting. We discussed risks of surgery including but not limited to medical and anesthetic complications, bleeding potentially requiring transfusion, infection, damage to surrounding organs or structures, pneumothorax, ureteral injury, stricture formation, and possible need for additional procedures or staged approach. Patient verbalized understanding. All questions were answered. She would like to proceed with a left percutaneous nephrolithotomy (PCNL).      Surgical Findings: No modification of the intended routine.  Left percutaneous nephrolithotomy performed for management of a 3 cm left upper pole kidney stone, which was completely fragmented and fragments removed.  Hemostasis satisfactory.     Operative Summary:  The patient was met in the preoperative holding area. Appropriate informed consent was obtained and the patient was brought to the operative suite. Appropriate timeout was performed per hospital protocol.  Appropriate monitoring devices were connected to the patient.  IV antibiotics were given for surgical prophylaxis.  SCDs were applied for DVT prophylaxis.  After the successful induction of general anesthesia, we placed the patient in frog-leg position while still on the stretcher.  The genitalia were prepped and draped sterilely.  Flexible cystoscopy was performed.  We visualized the distal end of the nephroureteral catheter that was recently inserted by interventional radiology and grasped it using a flexible cystoscopic grasper and brought it out through the urethral meatus so that we can have through and through access if needed.  A cap was connected to the distal end.  A 16 Urdu urethral Godoy catheter  was inserted into the bladder per urethra and connected to straight drainage.       The patient was then placed in the prone position. Pressure points were meticulously padded. The patient was prepped and draped in a standard sterile fashion.  I had 2 access points given by interventional radiology, one to the upper pole calyx directly to where the stone is and another one to the lower pole calyx of the kidney.  I elected to use the upper pole access.  The stone was radiopaque in the region of the upper pole.  We introduced an Amplatz stiff wire through the upper pole nephrostomy tube down to the level of the bladder under fluoroscopic guidance.  An incision was made in the skin with an 11 blade scalpel.  We used a [employer] 10 [place] of [place] dual-lumen to dilate the tract and placed a second wire into the bladder (0.035 sensor guidewire).  The nephrostomy catheter was removed while maintaining both wires in place.  The sensor wire was secured as a safety wire.  We used the [employer] NephroMax 30 Citizen of [place] by 12 cm balloon dilator kit to inflate and dilate the tract over the Super Stiff wire.  The provided sheath was placed over the balloon and the balloon was deflated.  The nephroscope was used to access the left upper pole kidney stone.  It was about 3 cm in size and was well-visualized.  The [employer] Swiss LithoClast Trilogy lithotripter was introduced and the stone was completely fragmented and the stone fragments were removed/suctioned under direct visualization.    We performed a full pyeloscopy first using a flexible cystoscope and then antegrade left ureteroscopy down to the UVJ using a [employer] lithovue Elite flexible ureteroscope.  No additional stones or stone fragments identified.  We then completed an antegrade pyelogram and there were no further stones left over or contrast extravasation.    At this point I then placed a [employer] 6 [place] of [place] by 24 cm ureteral stent from  above with fluoroscopic and visual guidance.  The lower pole nephroureteral catheter was then removed.  Following this, we placed a nephrostomy tube (18 Congolese [place] tip).  In antegrade was performed as well which demonstrated good position of the tube.  We used 3 cc of sterile water mixed with contrast to inflate the balloon.  The procedure was then complete.  A 2-0 silk suture was used to place a stitch to secure the nephrostomy tube.  Dry dressing was applied at the site.  Patient was then awoken from anesthesia and taken to the recovery room in stable condition.  All counts were correct x2.      Implants:   Implant Name Type Inv. Item Serial No.  Lot No. LRB No. Used Action   STENT URET 6FR L24CM PERCFLX HYDR+ TAPR LG - SN/A   STENT URET 6FR L24CM PERCFLX HYDR+ TAPR LG N/A [employer] WD [id number] Left 1 Implanted         Drains:   1.  16 Congolese urethral Godoy catheter, to straight drainage  2.  6 Congolese by 24 cm double-J left ureteral stent.  3.  18 Congolese [place] tip catheter as a left nephrostomy tube, to straight drainage.     Condition: Extubated and stable to PACU.     I was present for the entire procedure.  At her request, findings were reviewed with patient's family following the procedure        Mark Santos MD                  4/9       Date of Service: 4/9/2025  9:02 AM     Attested         [facility]  part of [facility]     Urology Progress Note        Subjective:   Pt is resting comfortably in bed. Afebrile, VSS. States pain is tolerable to left flank. Left nephrostomy is draining pink without clots, dressing is dry and intact with shadow of old drainage. Godoy is draining yellow urine with sediment but without clots. Tolerating diet without nausea. Has not ambuated since surgery. Has not been using IS. No c/o chest pain or difficulty breathing.     Cr 0.64  WBC 17.4 -> 12.5  Hgb 12.9 -> 10.8  Objective:      24hr Min/Max:  Temp  Min: 97 °F (36.1 °C)   Max: 99.2 °F (37.3 °C)  Pulse  Min: 56  Max: 99  BP  Min: 104/50  Max: 143/92  Resp  Min: 10  Max: 22  SpO2  Min: 94 %  Max: 100 %    Most Recent :         Vitals:     04/09/25 0855   BP: 94/68   Pulse: 97   Resp: 16   Temp: 98.5 °F (36.9 °C)     [unfilled]  I/O this shift:  In: 360 [P.O.:360]  Out: 440 [Urine:440]     GENERAL APPEARANCE: well developed, well nourished, in no acute distress  EYES: sclera non-icteric  ORAL CAVITY: mucosa moist  NECK/THYROID: no obvious masses or goiter  CV/RESP: non-labored breathing  ABDOMEN: soft, left flank appropriately TTP, nephrostomy draining pink urine  : Hager draining yellow urine with sediment and without clots  EXTREMITIES: warm, well perfused, no clubbing, cyanosis or edema        Results:            Lab Results   Component Value Date     WBC 12.5 (H) 04/09/2025     HGB 10.8 (L) 04/09/2025     HCT 32.1 (L) 04/09/2025     .0 04/09/2025     CREATSERUM 0.64 04/09/2025     BUN 7 (L) 04/09/2025      04/09/2025     K 3.8 04/09/2025      04/09/2025     CO2 22.0 04/09/2025     GLU 89 04/09/2025     CA 7.6 (L) 04/09/2025     ALB 3.4 09/04/2022     ALKPHO 81 09/04/2022     BILT 0.3 09/04/2022     TP 7.3 09/04/2022     AST 23 09/04/2022     ALT 29 09/04/2022     PTT 31.5 03/29/2025     INR 0.93 03/29/2025     T4F 0.9 01/08/2022     TSH 2.360 09/04/2022      08/18/2019     DDIMER 0.48 05/24/2020     CRP 9.76 (H) 05/24/2020     TROP <0.045 05/22/2020     CK 21 (L) 05/22/2020            Assessment and Plan:      Pt is a 34 year old female who is POD 1 status-post left percutaneous nephrolithotomy (PCNL), 3 centimeters in total, left antegrade pyelogram, left flexible nephroureteroscopy, and placement of left nephrostomy tube, hager catheter and double-J ureteral stent with Dr Santos. Doing well.      Nephrostomy clamped and balloon taken down.     Recommendations:  - Encourage use of IS and ambulation  - Advance diet as tolerated.  - Decrease IVF to  75 mL/hr. Saline lock when tolerating diet  - Stat CT Abd+Pelvis to assess stone burden     All questions answered. Patient verbalizes understanding and agrees with plan.     ANALY Pizano  [facility] Urology  4/9/2025           Attestation signed by Mark Santos MD at 4/9/2025  9:24 AM     Patient examined and assessed. Agree with above.      POD 1 status post left PCNL.  Doing well.  Tolerating diet without nausea or vomiting.  Pain controlled.     Labs, vital signs, and outputs reviewed.     Abdomen soft, nontender, nondistended.  Left nephrostomy tube draining light pink urine, no clots.  Nephrostomy dressing since surgery, mild drainage and no saturation.  Urethral Godoy catheter draining pink-tinged urine, no clots.     Nephrostomy tube capped at the bedside.  Nephrostomy balloon deflated.     Plan to obtain CT abdomen pelvis without contrast to evaluate for residual stone burden or ureteral stones.  If scan looks good and patient tolerating nephrostomy clamp trial, then nephrostomy tube would be removed followed by Godoy catheter removal in a few hours.     Hopefully discharge home later today pending clinical course.  Plan for office cystoscopy and stent removal in about 2-3 weeks.     Out of bed and ambulate.  Incentive spirometry nursing.  General diet as tolerated.  Decrease IV fluids to 75 cc/h and saline lock when tolerating general diet.     Discussed with patient, RN, urology APN.     Mark Santos MD  4/9/2025                                 Cosigned by: Mark Santos MD at 4/9/2025  9:24 AM          MEDICATIONS ADMINISTERED IN LAST 1 DAY:  acetaminophen (Ofirmev) 10 mg/mL infusion premix 1,000 mg       Date Action Dose Route User    4/9/2025 0544 New Bag 1,000 mg Intravenous Goldberg, Rachel, RN    4/8/2025 2223 New Bag 1,000 mg Intravenous Goldberg, Rachel, MARSHA          ceFAZolin (Ancef) 2 g/10mL IV syringe premix       Date Action Dose Route User    4/8/2025 1820 New Bag 2  g Intravenous Hue Dickson RN          ceFAZolin (Ancef) 2g in 10mL IV syringe premix       Date Action Dose Route User    4/9/2025 0843 New Bag 2 g Intravenous Mandy Bazan RN    4/9/2025 0059 New Bag 2 g Intravenous Goldberg, Rachel, RN    4/8/2025 1824 New Bag 2 g Intravenous Hue Dickson RN          docusate sodium (Colace) cap 100 mg       Date Action Dose Route User    4/9/2025 0843 Given 100 mg Oral Mandy Bazan RN    4/8/2025 1958 Given 100 mg Oral Goldberg, Rachel, RN          glycerin-hypromellose- (Artificial Tears) 0.2-0.2-1 % ophthalmic solution 1 drop       Date Action Dose Route User    4/9/2025 0527 Given 1 drop Both Eyes Goldberg, Rachel, RN    4/8/2025 2222 Given 1 drop Both Eyes Goldberg, Rachel, RN    4/8/2025 1826 Given 1 drop Both Eyes Hue Dickson RN          HYDROmorphone (Dilaudid) 1 MG/ML injection 0.5 mg       Date Action Dose Route User    4/9/2025 0348 Given 0.5 mg Intravenous Goldberg, Rachel, RN          oxyCODONE immediate release tab 10 mg       Date Action Dose Route User    4/9/2025 0103 Given 10 mg Oral Goldberg, Rachel, RN    4/8/2025 1653 Given 10 mg Oral Hue Dickson RN          sennosides (Senokot) tab 17.2 mg       Date Action Dose Route User    4/8/2025 1958 Given 17.2 mg Oral Goldberg, Rachel, RN          sodium chloride 0.9% infusion       Date Action Dose Route User    4/9/2025 0851 Rate/Dose Change (none) Intravenous Mandy Bazan RN    4/8/2025 2222 New Bag (none) Intravenous Goldberg, Rachel, RN            Vitals (last day)       Date/Time Temp Pulse Resp BP SpO2 Weight O2 Device O2 Flow Rate (L/min) [facility]    04/09/25 1312 98.5 °F (36.9 °C) 98 16 98/48 98 % -- None (Room air) -- KW    04/09/25 0855 98.5 °F (36.9 °C) 97 16 94/68 97 % -- None (Room air) -- CV    04/09/25 0344 98.4 °F (36.9 °C) 99 16 121/82 94 % -- None (Room air) -- RG    04/08/25 1952 98.6 °F (37 °C) 91 18 112/54 98 % -- None (Room air) -- RG     04/08/25 1705 -- -- -- -- -- 194 lb 6.4 oz (88.2 kg) -- -- CW    04/08/25 1642 98.1 °F (36.7 °C) 74 -- 114/98 95 % -- None (Room air) -- [place]    04/08/25 1550 99.2 °F (37.3 °C) 68 15 104/50 100 % -- Nasal cannula 2 L/min     04/08/25 1520 -- 92 17 126/76 100 % -- Nasal cannula 2 L/min     04/08/25 1450 -- 61 21 123/69 100 % -- Nasal cannula 2 L/min RM    04/08/25 1440 -- 69 17 118/72 100 % -- Nasal cannula 2 L/min     04/08/25 1430 -- 60 17 123/76 100 % -- Nasal cannula 2 L/min     04/08/25 1400 -- 56 10 142/91 99 % -- Nasal cannula 2 L/min     04/08/25 1350 -- 66 17 131/86 99 % -- Nasal cannula 2 L/min     04/08/25 1340 -- 85 15 143/92 99 % -- Nasal cannula 2 L/min     04/08/25 1330 -- 74 17 123/93 97 % -- Nasal cannula 2 L/min     04/08/25 1320 -- 76 17 124/86 96 % -- Nasal cannula 2 L/min     04/08/25 1310 -- 71 22 126/71 95 % -- Nasal cannula 2 L/min     04/08/25 1300 97 °F (36.1 °C) 99 17 133/108 96 % -- Nasal cannula 2 L/min     04/08/25 1000 -- 63 13 120/72 99 % -- None (Room air) -- BP    04/08/25 0945 -- 65 15 120/78 100 % -- None (Room air) -- BP    04/08/25 0930 -- 61 13 126/87 100 % -- None (Room air) -- BP    04/08/25 0915 -- 64 10 121/81 99 % -- None (Room air) -- BP    04/08/25 0905 -- 75 18 143/88 100 % -- None (Room air) -- BP    04/08/25 0800 -- -- -- -- -- 194 lb 6.4 oz (88.2 kg) -- -- BP    04/08/25 0745 97 °F (36.1 °C) 83 15 124/77 96 % -- None (Room air) -- BP

## 2025-04-10 NOTE — TELEPHONE ENCOUNTER
I called patient verified name and , patient was asked to change her appointment to a stent removal upon APRN request. She understood and I went over instructions with her she verbally understood and call ended.

## 2025-04-10 NOTE — PAYOR COMM NOTE
--------------  DISCHARGE REVIEW    Payor: [facility]  Subscriber #:  [id number]  Authorization Number: [id number]    Admit date: 25  Admit time:   4:34 PM  Discharge Date: 2025  6:37 PM     Admitting Physician: Kody Johnson MD  Attending Physician:  No att. providers found  Primary Care Physician: Yasemin Rodas MD          Discharge Summary Notes        Discharge Summary signed by Chelsi Fraire MD at 2025  9:20 PM       Author: Chelsi Fraire MD Specialty: HOSPITALIST, Family Medicine Author Type: Physician    Filed: 2025  9:20 PM Date of Service: 2025  5:00 PM Status: Signed    : Chelsi Fraire MD (Physician)         [facility]  part of [facility]    Discharge Summary    Karina Gil Patient Status:  Inpatient    12/3/1990 MRN [id number]   Location [facility] 4W/SW/SE Attending Chelsi Fraire MD   Hosp Day # 1 PCP YASEMIN RODAS MD     Date of Admission: 2025   Date of Discharge: 25     Admitting Diagnosis: Kidney stone on left side [N20.0]  Staghorn renal calculus [N20.0]    Disposition: Home    Discharge Diagnosis: .Principal Problem:    Kidney stone on left side      Hospital Course:   Reason for Admission: scheduled left percutaneous nephrolithotomy       Discharge Physical Exam:   GENERAL:  Alert, oriented to time, place, and person, mild distress.   HEENT:  Atraumatic.  Oropharynx clear.    LUNGS:  Clear to auscultation bilaterally.  Normal respiratory effort.  HEART:  Regular rate and rhythm.  S1 and S2 auscultated.  No murmur.  ABDOMEN:  Soft, nondistended.    EXTREMITIES:  No peripheral edema, clubbing, or cyanosis.  NEUROLOGIC:  Motor and sensory intact.        Hospital Course:   The patient is a 34-year-old  female with recurrent left flank pain and urinary tract infections.  Imaging studies showed left kidney staghorn calculus 20 x 13 mm.  Evaluated by Urology and admitted for scheduled urologic procedure. On 25  preoperatively had left percutaneous nephrostomy tube by Interventional Radiology Dr. EVER Johnson, and then Left Percutaneous nephrolithotomy with double-J ureteral stent by urology Dr. ELBA Santos.   Tolerated procedure well with no complications. Postop hospital course uneventful. Tolerated diet without nausea or vomiting.  Pain controlled. Labs, vital signs, and outputs reviewed.  Left nephrostomy tube was draining light pink urine, no clots.     CT abdomen pelvis without contrast 4/9 showed resolution of previous nonobstructing staghorn morphology left pole calculus. No suspicious calculus fragments along the course of the nephrostomy tube or nephroureteral stent.  Since CT Abd/pelvis reassuring, both Nephrostomy tube and hager catheter removed. Patient successfully passed voiding trial. Cleared from urology   Advised for mobility at home, IS use. General diet as tolerated.   Discharge instructions provided with plan for urology office cystoscopy and stent removal in about 2-3 weeks.   ER precautions reviewed.       Complications: None      Surgical Procedures       Case IDs Date Procedure Surgeon Location Status    [id number] 4/8/25 Left percutaneous nephrolithotomy, cystoscopy, left stent placement Mark Santos MD [facility] MAIN OR Comp          Pending Labs       Order Current Status    Calculi, Urinary In process            Discharge Plan:   Discharge Condition: Stable    Current Discharge Medication List        New Orders    Details   HYDROcodone-acetaminophen 5-325 MG Oral Tab Take 1-2 tablets by mouth every 6 (six) hours as needed for Pain.      senna-docusate 8.6-50 MG Oral Tab Take 2 tablets by mouth nightly as needed for constipation.      phenazopyridine (PYRIDIUM) 200 MG Oral Tab Take 1 tablet (200 mg total) by mouth 3 (three) times daily as needed for Pain.           Home Meds - Unchanged    Details   Phentermine HCl 30 MG Oral Cap Take 1 capsule (30 mg total) by mouth every morning.                 Discharge  Diet: As tolerated    Discharge Activity: As tolerated    Follow up:      Follow-up Information       Mark Santos MD Follow up.    Specialty: UROLOGY  Why: 2-3 weeks for cystoscopy with stent removal in the office. Our office should be reaching out to help you schedule that.  Contact information:  [address] [facility] [address]  [facility] [address]  [phone]               Maurice Rodas MD. Go in 2 day(s).    Specialty: Family Medicine  Why: For hospital discharge follow up.  Contact information:  [address] [address]  [facility] [address]  [phone]                                   Other Discharge Instructions:         Percutaneous Nephrolithotripsy (PCNL) Discharge Instructions    Hospital stay: Patients typically stay overnight in the hospital and go home the next day.     Diet:  - After anesthesia, begin with clear liquids. You may take what you like to eat or drink. Depending on how you feel the following day, you may resume your normal diet. The appetite may be diminished the first several days at home. Avoid heavy meals.   - Drink 6 to 8 glasses of water/fluids a day unless your healthcare provider tells you to limit your fluids.  - Expect bloody urine and possibly clots for up to 4-6 weeks following the procedure.    Wound Care:  - The small incision on your flank will drain some blood and urine for the next few days. This is completely normal. You may need to change your dressing every 1-2 hours for the first couple days. The drainage will decrease over the first few days at home.  - When you are not ambulating I recommend you put slight pressure on your flank by leaning back in your chair or in bed. This mild pressure should help the flank drainage to stop faster.    Activity:  - Be sure to walk at least six times per day. This helps prevents blood clots in the legs, which can travel to the lung and become life-threatening. You may take walks outside. You may go up and down stairs.  - You may  tire easily with minimal activity. Your incision will be tender and you will have some degree of pain for 3-4 weeks.  - You should avoid strenuous activity for ~ 6 weeks. This includes activities such as golf, tennis, cutting the grass, stretching exercises, lifting weights and so forth. You should avoid carrying anything over fifteen pounds for this six week period.  - Don’t drive until you are off your pain medicine and pain-free. This may take 2 to 4 weeks. Avoid long car rides for 3-4 weeks (you have a slightly higher risk of developing blood clots immediately following surgery).    Showering: You should shower once daily starting the day after discharge. Allow the water to run over the incision then pat dry. Avoid submerging in water (no baths, jacuzzi, pools, or soaking) for four weeks. You do not need to place anything over the incision once drainage stops but may place a gauze or bandaid if there is oozing or spotting.     Medication:   - If you take blood thinners (such as aspirin or plavix) please DO NOT take them when you go home. You may resume these medications in 2-4 weeks.  - You can obtain good pain relief by taking two acetaminophen (Tylenol) every four hours while awake for the first several days. You will also get a prescription for pain pills. This narcotic pain medication may also contain acetaminophen. Do not exceed 3000 mg acetaminophen per day.  - You may receive a prescription for a stool softener to avoid straining after surgery. Take plenty of fiber and water or over the counter stool softener to avoid constipation. It may take a few days to have your first bowel movement after surgery. You may use two tabs of over the counter Senekot or Senegen twice daily as needed for constipation.     Postoperative appointment: You will need a postoperative visit within a few weeks after your discharge. Call the office to make an appointment if you do not already have one.     Call the office or come  to the emergency room for fever over 101°F, difficulty breathing, chest pain, palpitations, significant nausea or vomiting, leg swelling or pain.          >30 minutes spent on discharge orders, coordination, and planning.     Chlesi Fraire MD  4/9/2025    Electronically signed by Chelsi Fraire MD on 4/9/2025  9:20 PM         REVIEWER COMMENTS

## 2025-04-10 NOTE — TELEPHONE ENCOUNTER
Dr. Santos,     *ACKNOWLEDGE BUTTON HAS BEEN MOVED TO THE TOP RIGHT RIBBON*    Please sign off on form if you agree to: disability for surgery     -Signature page will be the first page scanned  -From your Inbasket, Highlight the patient and click Chart   -Double click the 4/3/2025 Forms Completion telephone encounter  -Scroll down to the Media section   -Click the blue Hyperlink: disability Dr. Santos 4/10/2025  -Click Acknowledge located in the top right ribbon/menu   -Drag the mouse into the blank space of the document and a + sign will appear. Left click to   electronically sign the document.  -Once signed, simply exit out of the screen and you signature will be saved.     Thank you,    Yojana

## 2025-04-10 NOTE — TELEPHONE ENCOUNTER
Patient called to check status on forms- forms signed today and faxed to [employer]- advised patient will send L[employer] message once I get confirmation- verbal understanding

## 2025-04-26 LAB
CAHPO4 (BRUSHITE): 10 %
CAOX MONOHYDRATE: 10 %
CARBONATE APATITE: 80 %
WEIGHT-STONE: 873 MG

## 2025-04-30 ENCOUNTER — PROCEDURE (OUTPATIENT)
Dept: SURGERY | Facility: CLINIC | Age: 35
End: 2025-04-30

## 2025-04-30 ENCOUNTER — TELEPHONE (OUTPATIENT)
Dept: SURGERY | Facility: CLINIC | Age: 35
End: 2025-04-30

## 2025-04-30 VITALS
WEIGHT: 199 LBS | SYSTOLIC BLOOD PRESSURE: 125 MMHG | BODY MASS INDEX: 39.07 KG/M2 | HEIGHT: 60 IN | HEART RATE: 91 BPM | DIASTOLIC BLOOD PRESSURE: 84 MMHG

## 2025-04-30 DIAGNOSIS — N20.0 KIDNEY STONE ON LEFT SIDE: Primary | ICD-10-CM

## 2025-04-30 PROCEDURE — 3079F DIAST BP 80-89 MM HG: CPT | Performed by: UROLOGY

## 2025-04-30 PROCEDURE — 52310 CYSTOSCOPY AND TREATMENT: CPT | Performed by: UROLOGY

## 2025-04-30 PROCEDURE — 3074F SYST BP LT 130 MM HG: CPT | Performed by: UROLOGY

## 2025-04-30 PROCEDURE — 3008F BODY MASS INDEX DOCD: CPT | Performed by: UROLOGY

## 2025-04-30 RX ORDER — CIPROFLOXACIN 500 MG/1
500 TABLET, FILM COATED ORAL ONCE
Status: SHIPPED | OUTPATIENT
Start: 2025-04-30

## 2025-04-30 NOTE — PROGRESS NOTES
[facility] Urology  Follow-Up Visit    HPI: Karina Gil is a 34 year old female presents for a follow up visit. Patient was last seen on 11/20/2024.    INTERVAL HISTORY: Patient underwent a left PCNL on 4/8/2025 for management of a 2 cm staghorn left kidney stone.    Stone analysis revealing 80% carbonate apatite, 10% calcium oxalate monohydrate, and 10% Brushite composition.    CT on postop day 1 shows no residual left kidney stones.  Nonobstructing 5 and 2 mm right kidney stones.    Here for stent removal.    Complains of stent related discomfort.    No fevers or chills.    Reviewed past medical, surgical, family, and social history.  Reviewed med list and allergies.      REVIEW OF SYSTEMS:  Pertinent positives and negatives per HPI. A 10-point ROS was performed and is otherwise negative.       EXAM:  /84 (BP Location: Right arm, Patient Position: Sitting, Cuff Size: adult)   Pulse 91   Ht 5' (1.524 m)   Wt 199 lb (90.3 kg)   LMP 03/11/2025   BMI 38.86 kg/m²     Physical Exam  Constitutional:       General: She is not in acute distress.     Appearance: She is well-developed.   HENT:      Head: Normocephalic.   Eyes:      General: No scleral icterus.  Cardiovascular:      Rate and Rhythm: Normal rate.   Pulmonary:      Effort: Pulmonary effort is normal.   Skin:     General: Skin is warm and dry.   Neurological:      Mental Status: She is alert and oriented to person, place, and time.   Psychiatric:         Mood and Affect: Mood normal.         Behavior: Behavior normal.       PATHOLOGY:  See HPI for details.      LABS:  No results found.      IMAGING:    CT abdomen and pelvis without contrast 4/9/2025:    1. Interval placement of a left percutaneous nephrostomy tube as well as left nephroureteral stent.  Previously noted nonobstructing staghorn morphology left pole calculus is no longer identified.  No suspicious calculus fragments along the course of the nephrostomy tube or  nephroureteral stent.  Foci of gas in the left renal collecting system are likely postprocedural.  There is also likely postprocedural edema in the left flank.     2. Nonobstructing 5 mm and 2 mm right renal calculi.  No hydronephrosis/obstructive uropathy.     3. Godoy catheter in the urinary bladder.     4. Small left and trace right pleural effusions are new since prior exam.  Probable associated compressive atelectasis at the left greater than right lower lobes.     5. Sludge or small calculi in the dependent gallbladder.  No evidence of acute cholecystitis.       UROLOGY PROCEDURE:    CYSTOURETHROSCOPY with stent removal  Informed consent was obtained for the procedure.  A female chaperone was present.  Patient was prepped and draped in the usual sterile fashion.  An Ambu 16 Ghanaian flexible cystoscope was utilized for the procedure.    Anesthesia:  2% lidocaine gel.    Urethra: Normal.    Bladder: Normal.  No tumor, stone, diverticulum, or glomerulation.    U.O's: Normal.  Ureteral stent emanating from left UO.    Trabeculation: Not appreciated.    Distal coil of ureteral stent visualized and grasped using flexible cystoscopic graspers.  The stent and cystoscope were removed under direct visualization gently through the urethral meatus.  The stent was grossly identified to be intact and disposed of.  Patient tolerated the procedure well.    POST CYSTOSCOPY MEDICATIONS: sample one tablet Cipro 500 mg given to patient.    DIAGNOSIS: Cystoscopy with successful removal of left ureteral stent..      IMPRESSION:  34 year old female status post left percutaneous nephrolithotomy on 4/8/2025 for management of a staghorn 2 cm left kidney stone.    Doing well.    Stone analysis results reviewed with patient.  Discussed stone friendly dietary modifications to reduce future risk of stone formation.    Discussed 24-hour urine collection for further metabolic evaluation given her large kidney stone.  Serum uric acid and PTH  levels.    Stent removed uneventfully.  We will obtain a follow-up ultrasound of kidneys to ensure absence of hydronephrosis.    All questions answered.      PLAN:  1.  Stone friendly diet.    2.  Follow-up renal ultrasound in a few weeks.    3.  Follow-up in 6 weeks with a 24-hour urine collection, serum PTH and uric acid levels.    May return to work Monday, 5/5/2025.  No restrictions.      Mark Santos MD  4/30/2025

## 2025-05-06 ENCOUNTER — HOSPITAL ENCOUNTER (OUTPATIENT)
Dept: ULTRASOUND IMAGING | Facility: HOSPITAL | Age: 35
Discharge: HOME | End: 2025-05-06
Attending: UROLOGY
Payer: COMMERCIAL

## 2025-05-06 DIAGNOSIS — N20.0 KIDNEY STONE ON LEFT SIDE: ICD-10-CM

## 2025-05-06 PROCEDURE — 76775 US EXAM ABDO BACK WALL LIM: CPT | Performed by: UROLOGY

## 2025-06-09 ENCOUNTER — LAB ENCOUNTER (OUTPATIENT)
Dept: LAB | Facility: HOSPITAL | Age: 35
End: 2025-06-09
Attending: UROLOGY
Payer: COMMERCIAL

## 2025-06-09 DIAGNOSIS — N20.0 KIDNEY STONE ON LEFT SIDE: ICD-10-CM

## 2025-06-09 LAB
PTH-INTACT SERPL-MCNC: 70.6 PG/ML (ref 18.5–88)
URATE SERPL-MCNC: 5.5 MG/DL (ref 3.1–7.8)

## 2025-06-09 PROCEDURE — 83986 ASSAY PH BODY FLUID NOS: CPT

## 2025-06-09 PROCEDURE — 84392 ASSAY OF URINE SULFATE: CPT

## 2025-06-09 PROCEDURE — 36415 COLL VENOUS BLD VENIPUNCTURE: CPT | Performed by: UROLOGY

## 2025-06-09 PROCEDURE — 83945 ASSAY OF OXALATE: CPT

## 2025-06-09 PROCEDURE — 84105 ASSAY OF URINE PHOSPHORUS: CPT

## 2025-06-09 PROCEDURE — 83970 ASSAY OF PARATHORMONE: CPT | Performed by: UROLOGY

## 2025-06-09 PROCEDURE — 84560 ASSAY OF URINE/URIC ACID: CPT

## 2025-06-09 PROCEDURE — 84550 ASSAY OF BLOOD/URIC ACID: CPT | Performed by: UROLOGY

## 2025-06-09 PROCEDURE — 83735 ASSAY OF MAGNESIUM: CPT

## 2025-06-09 PROCEDURE — 81050 URINALYSIS VOLUME MEASURE: CPT

## 2025-06-09 PROCEDURE — 84300 ASSAY OF URINE SODIUM: CPT

## 2025-06-09 PROCEDURE — 84133 ASSAY OF URINE POTASSIUM: CPT

## 2025-06-09 PROCEDURE — 82436 ASSAY OF URINE CHLORIDE: CPT

## 2025-06-09 PROCEDURE — 82507 ASSAY OF CITRATE: CPT

## 2025-06-09 PROCEDURE — 82340 ASSAY OF CALCIUM IN URINE: CPT

## 2025-06-12 ENCOUNTER — OFFICE VISIT (OUTPATIENT)
Dept: SURGERY | Facility: CLINIC | Age: 35
End: 2025-06-12
Payer: COMMERCIAL

## 2025-06-12 DIAGNOSIS — N20.0 NEPHROLITHIASIS: Primary | ICD-10-CM

## 2025-06-12 PROCEDURE — 99214 OFFICE O/P EST MOD 30 MIN: CPT | Performed by: UROLOGY

## 2025-06-12 NOTE — PROGRESS NOTES
[facility] Urology  Follow-Up Visit    HPI: Karina Gil is a 34 year old female presents for a follow up visit. Patient was last seen on 4/30/2025.    INTERVAL HISTORY: Following up regarding bilateral nephrolithiasis.      Patient underwent a left PCNL on 4/8/2025 for management of a 2 cm staghorn left kidney stone.    Stone analysis revealing 80% carbonate apatite, 10% calcium oxalate monohydrate, and 10% Brushite composition.    CT on postop day 1 shows no residual left kidney stones.  Nonobstructing 5 and 2 mm right kidney stones.    Stent removed 4/30/2025.    Follow-up renal ultrasound 5/6/2025 showing no evidence of left kidney stones or hydronephrosis.  6 mm right lower pole nonobstructing kidney stone.    Serum uric acid and PTH levels from 6/9/2025 were negative.    Submitted 24-hour urine collection however results are still pending.    No fevers or chills.  No flank pain.    Reviewed past medical, surgical, family, and social history.  Reviewed med list and allergies.      REVIEW OF SYSTEMS:  Pertinent positives and negatives per HPI. A 10-point ROS was performed and is otherwise negative.       EXAM:  LMP 03/11/2025     Physical Exam  Constitutional:       General: She is not in acute distress.     Appearance: She is well-developed.   HENT:      Head: Normocephalic.   Eyes:      General: No scleral icterus.  Cardiovascular:      Rate and Rhythm: Normal rate.   Pulmonary:      Effort: Pulmonary effort is normal.   Skin:     General: Skin is warm and dry.   Neurological:      Mental Status: She is alert and oriented to person, place, and time.   Psychiatric:         Mood and Affect: Mood normal.         Behavior: Behavior normal.       PATHOLOGY:  See HPI for details.      LABS:  No results found.      IMAGING:    CT abdomen and pelvis without contrast 4/9/2025:    1. Interval placement of a left percutaneous nephrostomy tube as well as left nephroureteral stent.  Previously noted  nonobstructing staghorn morphology left pole calculus is no longer identified.  No suspicious calculus fragments along the course of the nephrostomy tube or nephroureteral stent.  Foci of gas in the left renal collecting system are likely postprocedural.  There is also likely postprocedural edema in the left flank.     2. Nonobstructing 5 mm and 2 mm right renal calculi.  No hydronephrosis/obstructive uropathy.     3. Godoy catheter in the urinary bladder.     4. Small left and trace right pleural effusions are new since prior exam.  Probable associated compressive atelectasis at the left greater than right lower lobes.     5. Sludge or small calculi in the dependent gallbladder.  No evidence of acute cholecystitis.       Renal bladder ultrasound 5/6/2025:    1. No hydroureteronephrosis.  Small nonobstructing 6 mm calculus lower pole right kidney unchanged.  Additional smaller right-sided punctate calcification could not be visualized.   2. Left ureteral stent has been removed.  No left renal calculi visualized.   3. Negative bladder sonogram.       UROLOGY PROCEDURE:  None performed today.      IMPRESSION:  34 year old female status post left percutaneous nephrolithotomy on 4/8/2025 for management of a staghorn 2 cm left kidney stone.    Doing well.    Stone analysis results reviewed with patient.  Discussed stone friendly dietary modifications to reduce future risk of stone formation.    Discussed normal serum PTH and uric acid levels.    24-hour urine collection results are still pending.  We will wait for these results to come back.    Discussed management of her nonobstructing asymptomatic right kidney stone.  This is not related to her left kidney stone which was treated with a percutaneous nephrolithotomy which is still within the 90-day global period.     Management options discussed including conservative management, right ESWL, right ureteroscopy with laser lithotripsy.      Benefits, risk, and  alternatives were reviewed.  Patient elects conservative management.    All questions answered.      PLAN:  1.  Stone friendly diet.    2.  Follow-up results of 24-hour urine collection.  Patient will be notified.    3.  Electing conservative management of small nonobstructing right kidney stone.      Mark Santos MD  6/12/2025

## 2025-06-14 LAB
AMMONIA, URINE: 46 MMOL/24 HR
CHLORIDE URINE: 149 MMOL/24 HR
CITRIC ACID(CITRATE): 456 MG/24 HR
CREATININE, URINE: 1305 MG/24 HR
LC CALCIUM OXALATE SATURATION, URINE: 3.32
LC CALCIUM PHOSPHATE SATURATION, URINE: 0.37
LC CALCIUM, URINE: 125 MG/24 HR
LC CALCIUM/CREATININE RATIO, URINE: 96 MG/G CREAT
MAGNESIUM, URINE: 77 MG/24 HR
OXALATES, URINE: 26 MG/24 HR
PH, 24 HR URINE: 5.74
PHOSPHORUS, URINE: 1009 MG/24 HR
POTASSIUM, URINE: 65 MMOL/24 HR
SODIUM, URINE: 143 MMOL/24 HR
SULFATE, URINE: 53 MEQ/24 HR
UREA NITROGEN, URINE: 12.73 G/24 HR
UREA NITROGEN, URINE: 12.73 G/24 HR
URIC ACID SATURATION, URINE: 1.13
URIC ACID SATURATION, URINE: 1.13
URIC ACID, URINE: 659 MG/24 HR
URINE VOLUME (PRESERVATIVE): 1870 ML/24 HR

## 2025-06-16 ENCOUNTER — TELEPHONE (OUTPATIENT)
Dept: SURGERY | Facility: CLINIC | Age: 35
End: 2025-06-16

## 2025-06-16 DIAGNOSIS — N20.0 KIDNEY STONES: Primary | ICD-10-CM

## 2025-06-16 RX ORDER — POTASSIUM CITRATE 15 MEQ/1
1 TABLET, EXTENDED RELEASE ORAL 2 TIMES DAILY
Qty: 120 TABLET | Refills: 11 | Status: SHIPPED | OUTPATIENT
Start: 2025-06-16 | End: 2025-07-16

## 2025-06-16 NOTE — TELEPHONE ENCOUNTER
Pt called back and was transferred to me with the Japanese interpretor Huma ID # [id number] on the line and I gave her all of the info and instructions in 's results msg as stated below and pt agreed to drink more fluids,  start the Potassium Citrate and will also complete the BMP 1 week after starting and also complete another 24 hr urine collection study 8-12 weeks after starting the med. I TOLD HER THAT i WILL PLACE THE ORDERS FOR THE TESTS IN THE SYSTEM AND ALSO SEND THE MED TO HER [facility] IN [place].            Mark Santos MD  6/16/2025  9:54 AM CDT Back to Top      Chinese-speaking.  Please inform patient that her 24-hour urine collection results were reviewed.     It showed a slightly low urine volume for 24 hours.  She made 1.87 L.  We usually recommend drinking enough water to produce 2.5 L of urine daily.  She should increase her daily water intake to about 70 ounces daily.     The rest of her urine results show that her citrate levels in the urine are a little low which may increase tendency to make stones.  There is a medication called potassium citrate that may help increase the citrate levels in the urine to make it less likely for future stones to form.     If she is agreeable, we can start her on potassium citrate extended release 15 mEq twice daily.  Check BMP for potassium level in 1 week after starting medication. Repeat another 24-hour urine collection in 8 to 12 weeks for follow-up to assess for response.     FRANCES

## 2025-06-16 NOTE — TELEPHONE ENCOUNTER
I called pt with the assistance of Cay[place] Islander interpretor Miguel, ID # [id number] and we had to LMTCB.

## 2025-06-28 ENCOUNTER — LAB ENCOUNTER (OUTPATIENT)
Dept: LAB | Facility: HOSPITAL | Age: 35
End: 2025-06-28
Attending: UROLOGY
Payer: COMMERCIAL

## 2025-06-28 DIAGNOSIS — N20.0 KIDNEY STONES: ICD-10-CM

## 2025-06-28 LAB
ANION GAP SERPL CALC-SCNC: 6 MMOL/L (ref 0–18)
BUN BLD-MCNC: 10 MG/DL (ref 9–23)
BUN/CREAT SERPL: 13.9 (ref 10–20)
CALCIUM BLD-MCNC: 9 MG/DL (ref 8.7–10.4)
CHLORIDE SERPL-SCNC: 106 MMOL/L (ref 98–112)
CO2 SERPL-SCNC: 28 MMOL/L (ref 21–32)
CREAT BLD-MCNC: 0.72 MG/DL (ref 0.55–1.02)
EGFRCR SERPLBLD CKD-EPI 2021: 112 ML/MIN/1.73M2 (ref 60–?)
FASTING STATUS PATIENT QL REPORTED: YES
GLUCOSE BLD-MCNC: 87 MG/DL (ref 70–99)
OSMOLALITY SERPL CALC.SUM OF ELEC: 288 MOSM/KG (ref 275–295)
POTASSIUM SERPL-SCNC: 3.9 MMOL/L (ref 3.5–5.1)
SODIUM SERPL-SCNC: 140 MMOL/L (ref 136–145)

## 2025-06-28 PROCEDURE — 36415 COLL VENOUS BLD VENIPUNCTURE: CPT

## 2025-06-28 PROCEDURE — 80048 BASIC METABOLIC PNL TOTAL CA: CPT

## 2025-08-11 ENCOUNTER — LAB ENCOUNTER (OUTPATIENT)
Dept: LAB | Facility: HOSPITAL | Age: 35
End: 2025-08-11
Attending: UROLOGY

## 2025-08-11 DIAGNOSIS — N20.0 KIDNEY STONES: ICD-10-CM

## 2025-08-11 PROCEDURE — 84392 ASSAY OF URINE SULFATE: CPT

## 2025-08-11 PROCEDURE — 82436 ASSAY OF URINE CHLORIDE: CPT

## 2025-08-11 PROCEDURE — 82340 ASSAY OF CALCIUM IN URINE: CPT

## 2025-08-11 PROCEDURE — 82507 ASSAY OF CITRATE: CPT

## 2025-08-11 PROCEDURE — 84300 ASSAY OF URINE SODIUM: CPT

## 2025-08-11 PROCEDURE — 83986 ASSAY PH BODY FLUID NOS: CPT

## 2025-08-11 PROCEDURE — 84105 ASSAY OF URINE PHOSPHORUS: CPT

## 2025-08-11 PROCEDURE — 81050 URINALYSIS VOLUME MEASURE: CPT

## 2025-08-11 PROCEDURE — 84560 ASSAY OF URINE/URIC ACID: CPT

## 2025-08-11 PROCEDURE — 83735 ASSAY OF MAGNESIUM: CPT

## 2025-08-11 PROCEDURE — 83945 ASSAY OF OXALATE: CPT

## 2025-08-11 PROCEDURE — 84133 ASSAY OF URINE POTASSIUM: CPT

## 2025-08-19 ENCOUNTER — TELEPHONE (OUTPATIENT)
Dept: SURGERY | Facility: CLINIC | Age: 35
End: 2025-08-19

## 2025-08-19 ENCOUNTER — RESULTS FOLLOW-UP (OUTPATIENT)
Dept: SURGERY | Facility: CLINIC | Age: 35
End: 2025-08-19

## 2025-08-19 DIAGNOSIS — N20.0 KIDNEY STONE: Primary | ICD-10-CM

## 2025-08-19 LAB
AMMONIA, URINE: 40 MMOL/24 HR
CHLORIDE URINE: 82 MMOL/24 HR
CITRIC ACID(CITRATE): 433 MG/24 HR
CREATININE, URINE: 1397 MG/24 HR
LC CALCIUM OXALATE SATURATION, URINE: 3.01
LC CALCIUM PHOSPHATE SATURATION, URINE: 0.54
LC CALCIUM, URINE: 104 MG/24 HR
LC CALCIUM/CREATININE RATIO, URINE: 74 MG/G CREAT
MAGNESIUM, URINE: 81 MG/24 HR
OXALATES, URINE: 29 MG/24 HR
PH, 24 HR URINE: 6.31
PHOSPHORUS, URINE: 738 MG/24 HR
POTASSIUM, URINE: 49 MMOL/24 HR
SODIUM, URINE: 96 MMOL/24 HR
SULFATE, URINE: 44 MEQ/24 HR
UREA NITROGEN, URINE: 11.53 G/24 HR
UREA NITROGEN, URINE: 11.53 G/24 HR
URIC ACID SATURATION, URINE: 0.35
URIC ACID SATURATION, URINE: 0.35
URIC ACID, URINE: 690 MG/24 HR
URINE VOLUME (PRESERVATIVE): 2200 ML/24 HR

## 2025-08-19 RX ORDER — POTASSIUM CITRATE 15 MEQ/1
2 TABLET, EXTENDED RELEASE ORAL 2 TIMES DAILY
Qty: 180 TABLET | Refills: 3 | Status: SHIPPED | OUTPATIENT
Start: 2025-08-19 | End: 2026-08-14

## (undated) DIAGNOSIS — E66.09 CLASS 2 OBESITY DUE TO EXCESS CALORIES WITHOUT SERIOUS COMORBIDITY WITH BODY MASS INDEX (BMI) OF 37.0 TO 37.9 IN ADULT: ICD-10-CM

## (undated) DIAGNOSIS — Z01.818 PREOP TESTING: Primary | ICD-10-CM

## (undated) DEVICE — DUAL LUMEN URETERAL CATHETER

## (undated) DEVICE — BAG DRNGE 2000ML URIN INF CTRL ANTIREFLX

## (undated) DEVICE — NITINOL WIRE WITH HYDROPHILIC TIP: Brand: SENSOR

## (undated) DEVICE — Device

## (undated) DEVICE — POSITIONER HEAD PRONE VOSS

## (undated) DEVICE — 3M™ TEGADERM™ TRANSPARENT FILM DRESSING FRAME STYLE, 1629, 8 IN X 12 IN (20 CM X 30 CM), 10/CT 8CT/CASE: Brand: 3M™ TEGADERM™

## (undated) DEVICE — CATCHER STONE W/ TBNG AND ADPT FOR SWISS

## (undated) DEVICE — SHEET,DRAPE,53X77,STERILE: Brand: MEDLINE

## (undated) DEVICE — 6 ML SYRINGE LUER-LOCK TIP: Brand: MONOJECT

## (undated) DEVICE — GUIDEWIRE: Brand: AMPLATZ SUPER STIFF

## (undated) DEVICE — TROCAR: Brand: KII® SLEEVE

## (undated) DEVICE — TRAY CATH FOLEY 16FR INCLUDE BARDX IC COMPLT CARE

## (undated) DEVICE — DRAPE CRAN 122X74X134IN SMS FAB HK AND LOOP

## (undated) DEVICE — TROCAR: Brand: KII FIOS FIRST ENTRY

## (undated) DEVICE — ENSEAL X1 TISSUE SEALER, CURVED JAW, 37 CM SHAFT LENGTH: Brand: ENSEAL

## (undated) DEVICE — MANIPULATOR CATH ESCP KRNR

## (undated) DEVICE — HIGH PRESSURE NEPHROSTOMY BALLOON CATHETER KIT: Brand: NEPHROMAX KIT

## (undated) DEVICE — DALE FOLEY CATHETER HOLDER, LEGBAND, FITS UP TO 30": Brand: DALE FOLEY CATHETER HOLDER

## (undated) DEVICE — GLOVE SUR 7.5 SENSICARE PI PIP CRM PWD F

## (undated) DEVICE — SINGLE-USE DIGITAL FLEXIBLE URETEROSCOPE: Brand: LITHOVUE™ ELITE

## (undated) DEVICE — ENDOSCOPIC VALVE WITH ADAPTER.: Brand: SURSEAL® II

## (undated) DEVICE — SUT PERMA- 2-0 18IN FS NABSRB BLK 26MM 3/8

## (undated) DEVICE — [HIGH FLOW INSUFFLATOR,  DO NOT USE IF PACKAGE IS DAMAGED,  KEEP DRY,  KEEP AWAY FROM SUNLIGHT,  PROTECT FROM HEAT AND RADIOACTIVE SOURCES.]: Brand: PNEUMOSURE

## (undated) DEVICE — SOLUTION  .9 1000ML BTL

## (undated) DEVICE — SOLUTION IRRIG 3000ML 0.9% NACL FLX CONT

## (undated) DEVICE — AEGIS 1" DISK 4MM HOLE, PEEL OPEN: Brand: MEDLINE

## (undated) DEVICE — SOLUTION  .9 3000ML

## (undated) DEVICE — 1010 S-DRAPE TOWEL DRAPE 10/BX: Brand: STERI-DRAPE™

## (undated) DEVICE — UNDYED BRAIDED (POLYGLACTIN 910), SYNTHETIC ABSORBABLE SUTURE: Brand: COATED VICRYL

## (undated) DEVICE — 3M™ STERI-STRIP™ COMPOUND BENZOIN TINCTURE 40 BAGS/CARTON 4 CARTONS/CASE C1544: Brand: 3M™ STERI-STRIP™

## (undated) DEVICE — TUR Y CYSTO TUBING SET IRRIG L96IN

## (undated) DEVICE — LAPAROSCOPY: Brand: MEDLINE INDUSTRIES, INC.

## (undated) DEVICE — C-ARM: Brand: UNBRANDED

## (undated) DEVICE — MINOR GENERAL: Brand: MEDLINE INDUSTRIES, INC.

## (undated) DEVICE — STERILE SURGICAL LUBRICANT, METAL TUBE: Brand: SURGILUBE

## (undated) DEVICE — VIOLET BRAIDED (POLYGLACTIN 910), SYNTHETIC ABSORBABLE SUTURE: Brand: COATED VICRYL

## (undated) DEVICE — PLUG,CATHETER,DRAINAGE PROTECTOR,TUBE: Brand: MEDLINE

## (undated) DEVICE — INSUFFLATION NEEDLE TO ESTABLISH PNEUMOPERITONEUM.: Brand: INSUFFLATION NEEDLE

## (undated) NOTE — LETTER
[address]      Authorization for Surgical Operation and Procedure     Date:____9/8/20_______                                                                                                         Time:_ potential risks that can occur: fever and allergic reactions, hemolytic reactions, transmission of diseases such as Hepatitis, AIDS and Cytomegalovirus (CMV) and fluid overload.   In the event that I wish to have an autologous transfusion of my own blood, o attending physician will determine when the applicable recovery period ends for purposes of reinstating the DNAR order.   10. Patients having a sterilization procedure: I understand that if the procedure is successful the results will be permanent and it wi _______________________________________________________________ _____________________________  Sho Shiplye Physician)                                                                                         (Date)                                   (Time)

## (undated) NOTE — LETTER
[address], [place], [place]  Autorización para operación y procedimiento quirúrgico   Fecha:___9/8/2020________                                                                                                         Žabnica: 4. En howard de que surja la necesidad srinivas mi operación o srinivas el periodo postoperatorio, también autorizo se aplique gali y/o productos sanguíneos.   Asimismo, entiendo que a [place] de las [address] y análisis de [place] o de los productos sa 6. Doy consentimiento para que se fotografíen o graben vídeos de las operaciones o procedimientos a realizarse, Allison Niall CurtisTodd Corral [address] las partes de mi cuerpo que stephan Las vagas para propósitos médicos, científicos o educativos, en el entendido de que, mi identidad no CERTIFICO QUE HE LEÍDO Y COMPRENDIDO EL CONSENTIMIENTO ANTERIOR PARA LA OPERACIÓN y/o [address] [facility].    ________________________________________  __________________________________  Caroline bashir paciente      Firma de la persona responsable

## (undated) NOTE — LETTER
5/2/2020          To Whom It May Concern:    Funmi Us is currently under my medical care. Due to her current pregnancy, it is advised that she work in an area with less strenuous activity and less exposure to chemicals.  If additional questions, analy

## (undated) NOTE — ED AVS SNAPSHOT
Amisha Kern   MRN: [id number]    Department:  [facility] Emergency Department   Date of Visit:  1/3/2020           Disclosure     Insurance plans vary and the physician(s) referred by the ER may not be covered by your plan.  Please contact yo CARE PHYSICIAN AT ONCE OR RETURN IMMEDIATELY TO THE EMERGENCY DEPARTMENT. If you have been prescribed any medication(s), please fill your prescription right away and begin taking the medication(s) as directed.   If you believe that any of the medications

## (undated) NOTE — LETTER
Hospital Discharge Documentation  Please phone to schedule a hospital follow up appointment.     From: [address] Hospitalist's Office  Phone: [phone]    Patient discharged time/date: 5/24/2020 12:01 PM  Patient discharge disposition:  Home or Self weeks pregnant with recently diagnosed COVID-19, p/w progressive mild dyspnea and dry cough.     #Pneumonia due to COVID-19 virus  - Pulm on consult  - Symptoms onset 5/14, SARS-CoV-2 positive 5/17, admitted for worsening symptoms 5/22.  - CTA chest 5/22 s pulmonary artery or main right and left pulmonary arteries. Evaluation of the remainder of the pulmonary arterial tree is limited by respiratory motion and surrounding parenchymal opacity.   If symptoms and clinical concern for a pulmonary embolism persist BILT 0.2  --   --    TP 6.8  --   --      No results found for: PT, INR    Discharge Medications:      Discharge Medications      CONTINUE taking these medications      Instructions Prescription details   Albuterol Sulfate 108 (90 Base) MCG/ACT Aepb      I

## (undated) NOTE — Clinical Note
Prior child with congenital heart defect -normal echocardiogram.  Continue with plan with a growth and BPP at 32 weeks and evaluation of the fetal kidneys.

## (undated) NOTE — LETTER
[facility]  Administration of Anesthesia  1. Selma Chavez, or _________________________________ acting on her behalf, (Patient) (Dependent/Representative) request to receive anesthesia for my pending procedure/operation/treatment. A physician (anesthesiologist) alone or an anesthesiologist working with a nurse anesthetist may administer my anesthesia. 2. I understand that my anesthesiologist is not an employee or agent of the hospital, but is an independent medical practitioner who has been permitted to use its facilities for the care and treatment of his/her patients. 3. I acknowledge that a physician from [facility] or their designate(s), recommended anesthesia for me using her/his medical judgment. The type(s) of anesthesia I may receive include:                a) General Anesthesia, b) Spinal/Epidural Anesthesia, c) Regional Anesthesia or d) Monitored Anesthesia Care. 4. If my spinal, regional or monitored anesthesia care (local) is not satisfactory for my comfort, or if my medical condition requires, I consent to the administration of general anesthesia. 5. I am aware that the practice of anesthesiology is not an exact science and that some foreseeable risks or consequences may occur. Some common risks/consequences include sore throat and hoarseness, nausea and vomiting, muscle soreness, backache, damage to the mouth/teeth/vocal cords and eye injury. I understand that more rare but serious potential risks of anesthesia include blood pressure changes, drug reactions, cardiac arrest, brain damage, paralysis or death. These risks apply to whether I have general, spinal/epidural, regional or monitored anesthesia care. 6. OBSTETRIC PATIENTS: Specific risks/consequences of spinal/epidural anesthesia may include itching, low blood pressure, difficulty urinating, slowing of the baby's heart rate and headache.  Rare risks include infections, high spinal block, spinal bleeding, seizure, cardiac arrest and death. 7. AWARENESS: I understand that it is possible (but unlikely) to have explicit memory of events from the operating room while under general anesthesia. 8. ELECTROCONVULSIVE THERAPY PATIENTS: This consent serves for all treatments in a single course of therapy. 9. I understand that I must inform my anesthesiologist when I last ate and/or drank to minimize the risk of anesthesia. 10. If I am pregnant, or may pregnant, I understand that elective surgery should be postponed until after the baby is born. Anesthetics cross the placenta and may temporarily anesthetize the baby. Although fetal complications of anesthesia during pregnancy are rare, they may include birth defects, premature labor, brain damage and death. 11. I certify that I informed the anesthesiologist, to the best of my ability, about medication I take including blood thinners, anticoagulants, herbal remedies, narcotics and recreational drugs (e.g. cocaine, marijuana, PCP). Failure to inform my anesthesiologist about these medicines may increase my risk of anesthetic complications. The nature and purpose of my anesthetic management was explained to me. I had the opportunity to ask questions and the answers and information provided meet my satisfaction.   I retain the right to withdraw this consent at any time prior to the administration of said anesthetic.    ___________________________________________________           _____________________________________________________  Patient Signature                                                                                      Witness Signature                ___________________________________________________           _____________________________________________________  Date/Time                                                                                               Responsible person in case of minor/ unconscious pt /Relationship    My signature below affirms that prior to the time of the procedure, I have explained to the patient and/or his/her guardian, the risks and benefits of undergoing anesthesia, as well as any reasonable alternatives.     ___________________________________________________            _____________________________________________________  Physician Signature                            Date/Time  Patient Name: Ifeanyi Villalta     : 12/3/1990     Printed: 3/23/2022      Medical Record #: [id number]                              Page 1 of 1    ----------ANESTHESIA CONSENT----------

## (undated) NOTE — LETTER
3/15/2022              Gautam Browne        [address] [place] [id number]         To Whom It May Concern:      Gautam Browne is currently under our medical care. She is advised to stop working at this time due to her risk factors in pregnancy. Her current risk factors include breech presentation, abdominal/back pains and a high BMI. She also has a history of a fetal demise. If you require any additional information, please contact the office at the number listed below.      Sincerely,    Flakita Merino MD   [place] [address]  [phone]

## (undated) NOTE — LETTER
[facility]  Administration of Anesthesia  1. Shamir Tyler, or _________________________________ acting on her behalf, (Patient) (Dependent/Representative) request to receive anesthesia for my pending procedure/operation/treatment.   A infections, high spinal block, spinal bleeding, seizure, cardiac arrest and death. 7. AWARENESS: I understand that it is possible (but unlikely) to have explicit memory of events from the operating room while under general anesthesia.   8. ELECTROCONVULSIV unconscious pt /Relationship    My signature below affirms that prior to the time of the procedure, I have explained to the patient and/or his/her guardian, the risks and benefits of undergoing anesthesia, as well as any reasonable alternatives.     _______

## (undated) NOTE — LETTER
[facility]  Administration of Anesthesia  Karina SEWELL agree to be cared for by a physician anesthesiologist alone and/or with a nurse anesthetist, who is specially trained to monitor me and give me medicine to put me to sleep or keep me comfortable during my procedure    I understand that my anesthesiologist and/or anesthetist is not an employee or agent of [facility] or [email or web address]. He or she works for [facility]    As the patient asking for anesthesia services, I agree to:  Allow the anesthesiologist (anesthesia doctor) to give me medicine and do additional procedures as necessary. Some examples are: Starting or using an “IV” to give me medicine, fluids or blood during my procedure, and having a breathing tube placed to help me breathe when I’m asleep (intubation). In the event that my heart stops working properly, I understand that my anesthesiologist will make every effort to sustain my life, unless otherwise directed by [facility] Do Not Resuscitate documents.  Tell my anesthesia doctor before my procedure:  If I am pregnant.  The last time that I ate or drank.  iii. All of the medicines I take (including prescriptions, herbal supplements, and pills I can buy without a prescription (including street drugs/illegal medications). Failure to inform my anesthesiologist about these medicines may increase my risk of anesthetic complications.  iv.If I am allergic to anything or have had a reaction to anesthesia before.  I understand how the anesthesia medicine will help me (benefits).  I understand that with any type of anesthesia medicine there are risks:  The most common risks are: nausea, vomiting, sore throat, muscle soreness, damage to my eyes, mouth, or teeth (from breathing tube placement).  Rare risks include: remembering what happened during my procedure, allergic reactions to medications, injury to my airway, heart, lungs, vision, nerves, or  muscles and in extremely rare instances death.  My doctor has explained to me other choices available to me for my care (alternatives).  Pregnant Patients (“epidural”):  I understand that the risks of having an epidural (medicine given into my back to help control pain during labor), include itching, low blood pressure, difficulty urinating, headache or slowing of the baby’s heart. Very rare risks include infection, bleeding, seizure, irregular heart rhythms and nerve injury.  Regional Anesthesia (“spinal”, “epidural”, & “nerve blocks”):  I understand that rare but potential complications include headache, bleeding, infection, seizure, irregular heart rhythms, and nerve injury.    _____________________________________________________________________________  Patient (or Representative) Signature/Relationship to Patient  Date   Time    _____________________________________________________________________________   Name (if used)    Language/Organization   Time    _____________________________________________________________________________  Nurse Anesthetist Signature     Date   Time  _____________________________________________________________________________  Anesthesiologist Signature     Date   Time  I have discussed the procedure and information above with the patient (or patient’s representative) and answered their questions. The patient or their representative has agreed to have anesthesia services.    _____________________________________________________________________________  Witness        Date   Time  I have verified that the signature is that of the patient or patient’s representative, and that it was signed before the procedure  Patient Name: Karina Gil     : 12/3/1990                 Printed: 2025 at 7:32 AM    Medical Record #: [id number]                                            Page 1 of 1  ----------ANESTHESIA CONSENT----------

## (undated) NOTE — LETTER
AUTHORIZATION FOR SURGICAL OPERATION OR OTHER PROCEDURE    1. I hereby authorize Dr. Joenathan Closs  and [place] [facility], [facility] staff assigned to my case to perform the following operation and/or procedure at the [facility], [facility]:    Cortisone injection in Bilateral heels  _______________________________________________________________________________________________      _______________________________________________________________________________________________    2. My physician has explained the nature and purpose of the operation or other procedure, possible alternative methods of treatment, the risks involved, and the possibility of complication to me. I acknowledge that no guarantee has been made as to the result that may be obtained. 3.  I recognize that, during the course of this operation, or other procedure, unforseen conditions may necessitate additional or different procedure than those listed above. I, therefore, further authorize and request that the above named physician, his/her physician assistants or designees perform such procedures as are, in his/her professional opinion, necessary and desirable. 4.  Any tissue or organs removed in the operation or other procedure may be disposed of by and at the discretion of the [facility], [facility] and [facility]. 5.  I understand that in the event of a medical emergency, I will be transported by local paramedics to [facility] or other hospital emergency department. 6.  I certify that I have read and fully understand the above consent to operation and/or other procedure. 7.  I acknowledge that my physician has explained sedation/analgesia administration to me including the risks and benefits. I consent to the administration of sedation/analgesia as may be necessary or desirable in the judgement of my physician.     Witness signature: ___________________________________________________ Date:  ______/______/_____ Time:  ________ A. M.  P.M. Patient Name:  ______________________________________________________  (please print)      Patient signature:  ___________________________________________________             Relationship to Patient:           []  Parent    Responsible person                          []  Spouse  In case of minor or                    [] Other  _____________   Incompetent name:  __________________________________________________                               (please print)      _____________      Responsible person  In case of minor or  Incompetent signature:  _______________________________________________    Statement of Physician  My signature below affirms that prior to the time of the procedure, I have explained to the patient and/or his/her guardian, the risks and benefits involved in the proposed treatment and any reasonable alternative to the proposed treatment. I have also explained the risks and benefits involved in the refusal of the proposed treatment and have answered the patient's questions.                         Date:  ______/______/_______  Provider                      Signature:  __________________________________________________________       Time:  ___________ A.M    P.M.

## (undated) NOTE — LETTER
[place] [address]  Authorization for Invasive Procedures  Date: 4/08/2025           Time: 0754    I hereby authorize Dr. Santos, my physician and his/her assistants (if applicable), which may include medical students, residents, and/or fellows, to perform the following surgical operation/ procedure and administer such anesthesia as may be determined necessary by my physician: Left percutaneous nephrolithotomy possible left retrograde urethroscopy on Karina Oliverado  2.   I recognize that during the surgical operation/procedure, unforeseen conditions may necessitate additional or different procedures than those listed above.  I, therefore, further authorize and request that the above-named surgeon, assistants, or designees perform such procedures as are, in their judgment, necessary and desirable.    3.   My surgeon/physician has discussed prior to my surgery the potential benefits, risks and side effects of this procedure; the likelihood of achieving goals; and potential problems that might occur during recuperation.  They also discussed reasonable alternatives to the procedure, including risks, benefits, and side effects related to the alternatives and risks related to not receiving this procedure.  I have had all my questions answered and I acknowledge that no guarantee has been made as to the result that may be obtained.    4.   Should the need arise during my operation/procedure, which includes change of level of care prior to discharge, I also consent to the administration of blood and/or blood products.  Further, I understand that despite careful testing and screening of blood or blood products by collecting agencies, I may still be subject to ill effects as a result of receiving a blood transfusion and/or blood products.  The following are some, but not all, of the potential risks that can occur: fever and allergic reactions, hemolytic reactions, transmission of diseases such as  Hepatitis, AIDS and Cytomegalovirus (CMV) and fluid overload.  In the event that I wish to have an autologous transfusion of my own blood, or a directed donor transfusion, I will discuss this with my physician.   Check only if Refusing Blood or Blood Products  I understand refusal of blood or blood products as deemed necessary by my physician may have serious consequences to my condition to include possible death. I hereby assume responsibility for my refusal and release the hospital, its personnel, and my physicians from any responsibility for the consequences of my refusal.         o  Refuse         5.   I authorize the use of any specimen, organs, tissues, body parts or foreign objects that may be removed from my body during the operation/procedure for diagnosis, research or teaching purposes and their subsequent disposal by hospital authorities.  I also authorize the release of specimen test results and/or written reports to my treating physician on the hospital medical staff or other referring or consulting physicians involved in my care, at the discretion of the Pathologist or my treating physician.    6.   I consent to the photographing or videotaping of the operations or procedures to be performed, including appropriate portions of my body for medical, scientific, or educational purposes, provided my identity is not revealed by the pictures or by descriptive texts accompanying them.  If the procedure has been photographed/videotaped, the surgeon will obtain the original picture, image, videotape or CD.  The hospital will not be responsible for storage, release or maintenance of the picture, image, tape or CD.    7.   I consent to the presence of a  or observers in the operating room as deemed necessary by my physician or their designees.    8.   I recognize that in the event my procedure results in extended X-Ray/fluoroscopy time, I may develop a skin reaction.    9. If I have a Do Not  Attempt Resuscitation (DNAR) order in place, that status will be suspended while in the operating room, procedural suite, and during the recovery period unless otherwise explicitly stated by me (or a person authorized to consent on my behalf). The surgeon or my attending physician will determine when the applicable recovery period ends for purposes of reinstating the DNAR order.  10. Patients having a sterilization procedure: I understand that if the procedure is successful the results will be permanent and it will therefore be impossible for me to inseminate, conceive, or bear children.  I also understand that the procedure is intended to result in sterility, although the result has not been guaranteed.   11. I acknowledge that my physician has explained sedation/analgesia administration to me including the risk and benefits I consent to the administration of sedation/analgesia as may be necessary or desirable in the judgment of my physician.    I CERTIFY THAT I HAVE READ AND FULLY UNDERSTAND THE ABOVE CONSENT TO OPERATION and/or OTHER PROCEDURE.        ____________________________________       _________________________________      ______________________________  Signature of Patient         Signature of Responsible Person        Printed Name of Responsible Person        ____________________________________      _________________________________      ______________________________       Signature of Witness          Relationship to Patient                       Date                                       Time  Patient Name: Karina Gil  : 12/3/1990    Reviewed: 2024   Printed: 2025  Medical Record #: [id number] Page 1 of 2             STATEMENT OF PHYSICIAN My signature below affirms that prior to the time of the procedure; I have explained to the patient and/or his/her legal representative, the risks and benefits involved in the proposed treatment and any reasonable alternative to the  proposed treatment. I have also explained the risks and benefits involved in refusal of the proposed treatment and alternatives to the proposed treatment and have answered the patient's questions. If I have a significant financial interest in a co-management agreement or a significant financial interest in any product or implant, or other significant relationship used in this procedure/surgery, I have disclosed this and had a discussion with my patient.     _______________________________________________________________ _____________________________  (Signature of Physician)                                                                                         (Date)                                   (Time)  Patient Name: Karina Gil  : 12/3/1990    Reviewed: 2024   Printed: 2025  Medical Record #: [id number] Page 2 of 2

## (undated) NOTE — LETTER
Date & Time: 1/7/2021, 10:09 AM  Patient: Yuki Ocampo  Encounter Provider(s):    ANALY King       To Whom It May Concern:    Yuki Ocampo was seen and treated in our department on 1/7/2021.  She can return to work with R arm restrictions: no

## (undated) NOTE — ED AVS SNAPSHOT
Rusty Vargas   MRN: [id number]    Department:  [facility] Emergency Department   Date of Visit:  8/18/2019           Disclosure     Insurance plans vary and the physician(s) referred by the ER may not be covered by your plan.  Please contact y CARE PHYSICIAN AT ONCE OR RETURN IMMEDIATELY TO THE EMERGENCY DEPARTMENT. If you have been prescribed any medication(s), please fill your prescription right away and begin taking the medication(s) as directed.   If you believe that any of the medications

## (undated) NOTE — LETTER
3/4/2020              Weston Otero        [address] [place] [address]         To Whom It May Concern,    Please be advised that Ms Weston Otero (: 12/3/1990) is a patient under my care.   Ms. Whitney Christensen is currently pregnant and should a

## (undated) NOTE — LETTER
[facility]  Administration of Anesthesia  1. Hannah Steinberg, or _________________________________ acting on her behalf, (Patient) (Dependent/Representative) request to receive anesthesia for my pending procedure/operation/treatment. A physician (anesthesiologist) alone or an anesthesiologist working with a nurse anesthetist may administer my anesthesia. 2. I understand that my anesthesiologist is not an employee or agent of the hospital, but is an independent medical practitioner who has been permitted to use its facilities for the care and treatment of his/her patients. 3. I acknowledge that a physician from [facility] or their designate(s), recommended anesthesia for me using her/his medical judgment. The type(s) of anesthesia I may receive include:                a) General Anesthesia, b) Spinal/Epidural Anesthesia, c) Regional Anesthesia or d) Monitored Anesthesia Care. 4. If my spinal, regional or monitored anesthesia care (local) is not satisfactory for my comfort, or if my medical condition requires, I consent to the administration of general anesthesia. 5. I am aware that the practice of anesthesiology is not an exact science and that some foreseeable risks or consequences may occur. Some common risks/consequences include sore throat and hoarseness, nausea and vomiting, muscle soreness, backache, damage to the mouth/teeth/vocal cords and eye injury. I understand that more rare but serious potential risks of anesthesia include blood pressure changes, drug reactions, cardiac arrest, brain damage, paralysis or death. These risks apply to whether I have general, spinal/epidural, regional or monitored anesthesia care. 6. OBSTETRIC PATIENTS: Specific risks/consequences of spinal/epidural anesthesia may include itching, low blood pressure, difficulty urinating, slowing of the baby's heart rate and headache.  Rare risks include infections, high spinal block, spinal bleeding, seizure, cardiac arrest and death. 7. AWARENESS: I understand that it is possible (but unlikely) to have explicit memory of events from the operating room while under general anesthesia. 8. ELECTROCONVULSIVE THERAPY PATIENTS: This consent serves for all treatments in a single course of therapy. 9. I understand that I must inform my anesthesiologist when I last ate and/or drank to minimize the risk of anesthesia. 10. If I am pregnant, or may pregnant, I understand that elective surgery should be postponed until after the baby is born. Anesthetics cross the placenta and may temporarily anesthetize the baby. Although fetal complications of anesthesia during pregnancy are rare, they may include birth defects, premature labor, brain damage and death. 11. I certify that I informed the anesthesiologist, to the best of my ability, about medication I take including blood thinners, anticoagulants, herbal remedies, narcotics and recreational drugs (e.g. cocaine, marijuana, PCP). Failure to inform my anesthesiologist about these medicines may increase my risk of anesthetic complications. The nature and purpose of my anesthetic management was explained to me. I had the opportunity to ask questions and the answers and information provided meet my satisfaction.   I retain the right to withdraw this consent at any time prior to the administration of said anesthetic.    ___________________________________________________           _____________________________________________________  Patient Signature                                                                                      Witness Signature                ___________________________________________________           _____________________________________________________  Date/Time                                                                                               Responsible person in case of minor/ unconscious pt /Relationship    My signature below affirms that prior to the time of the procedure, I have explained to the patient and/or his/her guardian, the risks and benefits of undergoing anesthesia, as well as any reasonable alternatives.     ___________________________________________________            _____________________________________________________  Physician Signature                            Date/Time  Patient Name: Pamela Singh     : 12/3/1990     Printed: 3/26/2022      Medical Record #: [id number]                              Page 1 of 1    ----------ANESTHESIA CONSENT----------

## (undated) NOTE — LETTER
[facility]  Administration of Anesthesia  1. Scar Scherer, or _________________________________ acting on her behalf, (Patient) (Dependent/Representative) request to receive anesthesia for my pending procedure/operation/treatment.   A 6. OBSTETRIC PATIENTS: Specific risks/consequences of spinal/epidural anesthesia may include itching, low blood pressure, difficulty urinating, slowing of the baby's heart rate and headache.  Rare risks include infections, high spinal block, spinal bleeding ___________________________________________________           _____________________________________________________  Date/Time                                                                                               Responsible person in case of minor

## (undated) NOTE — Clinical Note
IUP at 20w1d  Normal level 2 ultrasound except for bilateral pyelectasis  Bilateral pyelectasis  Hypothyroidism, controlled  Class II obesity  H/o IUFD due to congenital heart defect    RECOMMENDATIONS:  Continue care with Dr. Constance Kumar  Monitor TSH every 8 to

## (undated) NOTE — Clinical Note
Thank you for the consult. I saw Ms. Lien Chaparro in the endocrine/diabetes clinic today. Please see attached my note. Please feel free to contact me with any questions. Thanks!

## (undated) NOTE — LETTER
[address]  [address] [place], [place]      Authorization for Surgical Operation and Procedure     Date:__9/8/2020_________                                                                                                         Time 4.   Should the need arise during my operation or immediate post-operative period, I also consent to the administration of blood and/or blood products.   Further, I understand that despite careful testing and screening of blood or blood products by chirag 8.   I recognize that in the event my procedure results in extended X-Ray/fluoroscopy time, I may develop a skin reaction. 9.  If I have a Do Not Attempt Resuscitation (DNAR) order in place, that status will be suspended while in the operating room, proc STATEMENT OF PHYSICIAN My signature below affirms that prior to the time of the procedure; I have explained to the patient and/or his/her legal representative, the risks and benefits involved in the proposed treatment and any reasonable alternative to the

## (undated) NOTE — LETTER
Hospital Discharge Documentation      From: [facility] Hospitalist's Office  Phone: [phone]    Patient discharged time/date: 2025    Patient discharge disposition:  Home or Self Care       Discharge Summary - D/C Summary        Discharge Summary signed by Chelsi Fraire MD at 2025  9:20 PM  Version 1 of 1      Author: Chelsi Fraire MD Service: Internal Medicine Author Type: Physician    Filed: 2025  9:20 PM Date of Service: 2025  5:00 PM Status: Signed    : Chelsi Fraire MD (Physician)         [facility]  part of [facility]    Discharge Summary    Karina Gil Patient Status:  Inpatient    12/3/1990 MRN [id number]   Location [facility] 4W/SW/SE Attending Chelsi Fraire MD   Hosp Day # 1 PCP YASEMIN PEREZ MD     Date of Admission: 2025   Date of Discharge: 25     Admitting Diagnosis: Kidney stone on left side [N20.0]  Staghorn renal calculus [N20.0]    Disposition: Home    Discharge Diagnosis: .Principal Problem:    Kidney stone on left side      Hospital Course:   Reason for Admission: scheduled left percutaneous nephrolithotomy       Discharge Physical Exam:   GENERAL:  Alert, oriented to time, place, and person, mild distress.   HEENT:  Atraumatic.  Oropharynx clear.    LUNGS:  Clear to auscultation bilaterally.  Normal respiratory effort.  HEART:  Regular rate and rhythm.  S1 and S2 auscultated.  No murmur.  ABDOMEN:  Soft, nondistended.    EXTREMITIES:  No peripheral edema, clubbing, or cyanosis.  NEUROLOGIC:  Motor and sensory intact.        Hospital Course:   The patient is a 34-year-old  female with recurrent left flank pain and urinary tract infections.  Imaging studies showed left kidney staghorn calculus 20 x 13 mm.  Evaluated by Urology and admitted for scheduled urologic procedure. On 25 preoperatively had left percutaneous nephrostomy tube by Interventional Radiology Dr. EVER Johnson, and then Left Percutaneous  nephrolithotomy with double-J ureteral stent by urology Dr. ELBA Santos.   Tolerated procedure well with no complications. Postop hospital course uneventful. Tolerated diet without nausea or vomiting.  Pain controlled. Labs, vital signs, and outputs reviewed.  Left nephrostomy tube was draining light pink urine, no clots.     CT abdomen pelvis without contrast 4/9 showed resolution of previous nonobstructing staghorn morphology left pole calculus. No suspicious calculus fragments along the course of the nephrostomy tube or nephroureteral stent.  Since CT Abd/pelvis reassuring, both Nephrostomy tube and hager catheter removed. Patient successfully passed voiding trial. Cleared from urology   Advised for mobility at home, IS use. General diet as tolerated.   Discharge instructions provided with plan for urology office cystoscopy and stent removal in about 2-3 weeks.   ER precautions reviewed.       Complications: None      Surgical Procedures       Case IDs Date Procedure Surgeon Location Status    [id number] 4/8/25 Left percutaneous nephrolithotomy, cystoscopy, left stent placement Mark Santos MD [facility] MAIN OR Comp          Pending Labs       Order Current Status    Calculi, Urinary In process            Discharge Plan:   Discharge Condition: Stable    Current Discharge Medication List        New Orders    Details   HYDROcodone-acetaminophen 5-325 MG Oral Tab Take 1-2 tablets by mouth every 6 (six) hours as needed for Pain.      senna-docusate 8.6-50 MG Oral Tab Take 2 tablets by mouth nightly as needed for constipation.      phenazopyridine (PYRIDIUM) 200 MG Oral Tab Take 1 tablet (200 mg total) by mouth 3 (three) times daily as needed for Pain.           Home Meds - Unchanged    Details   Phentermine HCl 30 MG Oral Cap Take 1 capsule (30 mg total) by mouth every morning.                 Discharge Diet: As tolerated    Discharge Activity: As tolerated    Follow up:      Follow-up Information       Mark Santos,  MD Follow up.    Specialty: UROLOGY  Why: 2-3 weeks for cystoscopy with stent removal in the office. Our office should be reaching out to help you schedule that.  Contact information:  [address] [facility] [address]  [facility] [id number] [phone]               Maurice Rodas MD. Go in 2 day(s).    Specialty: Family Medicine  Why: For hospital discharge follow up.  Contact information:  [address] [facility] [address]  [facility] [address]  [phone]                                   Other Discharge Instructions:         Percutaneous Nephrolithotripsy ([facility]) Discharge Instructions    Hospital stay: Patients typically stay overnight in the hospital and go home the next day.     Diet:  - After anesthesia, begin with clear liquids. You may take what you like to eat or drink. Depending on how you feel the following day, you may resume your normal diet. The appetite may be diminished the first several days at home. Avoid heavy meals.   - Drink 6 to 8 glasses of water/fluids a day unless your healthcare provider tells you to limit your fluids.  - Expect bloody urine and possibly clots for up to 4-6 weeks following the procedure.    Wound Care:  - The small incision on your flank will drain some blood and urine for the next few days. This is completely normal. You may need to change your dressing every 1-2 hours for the first couple days. The drainage will decrease over the first few days at home.  - When you are not ambulating I recommend you put slight pressure on your flank by leaning back in your chair or in bed. This mild pressure should help the flank drainage to stop faster.    Activity:  - Be sure to walk at least six times per day. This helps prevents blood clots in the legs, which can travel to the lung and become life-threatening. You may take walks outside. You may go up and down stairs.  - You may tire easily with minimal activity. Your incision will be tender and you will have some degree of pain for 3-4 weeks.  - You  should avoid strenuous activity for ~ 6 weeks. This includes activities such as golf, tennis, cutting the grass, stretching exercises, lifting weights and so forth. You should avoid carrying anything over fifteen pounds for this six week period.  - Don’t drive until you are off your pain medicine and pain-free. This may take 2 to 4 weeks. Avoid long car rides for 3-4 weeks (you have a slightly higher risk of developing blood clots immediately following surgery).    Showering: You should shower once daily starting the day after discharge. Allow the water to run over the incision then pat dry. Avoid submerging in water (no baths, jacuzzi, pools, or soaking) for four weeks. You do not need to place anything over the incision once drainage stops but may place a gauze or bandaid if there is oozing or spotting.     Medication:   - If you take blood thinners (such as aspirin or plavix) please DO NOT take them when you go home. You may resume these medications in 2-4 weeks.  - You can obtain good pain relief by taking two acetaminophen (Tylenol) every four hours while awake for the first several days. You will also get a prescription for pain pills. This narcotic pain medication may also contain acetaminophen. Do not exceed 3000 mg acetaminophen per day.  - You may receive a prescription for a stool softener to avoid straining after surgery. Take plenty of fiber and water or over the counter stool softener to avoid constipation. It may take a few days to have your first bowel movement after surgery. You may use two tabs of over the counter Senekot or Senegen twice daily as needed for constipation.     Postoperative appointment: You will need a postoperative visit within a few weeks after your discharge. Call the office to make an appointment if you do not already have one.     Call the office or come to the emergency room for fever over 101°F, difficulty breathing, chest pain, palpitations, significant nausea or vomiting,  leg swelling or pain.          >30 minutes spent on discharge orders, coordination, and planning.     Chelsi Fraire MD  4/9/2025    Electronically signed by Chelsi Fraire MD on 4/9/2025  9:20 PM

## (undated) NOTE — LETTER
3/5/2020              Kadie Red        [address] [facility]         Dear Cinthya Harris,    It was a pleasure to see you. Your Prenatal labs & glucose test  were normal.  There is no need for further testing at this time.   I look forward t

## (undated) NOTE — LETTER
[facility]   Administracion de Tod Garrido, O ___________________________________ Camryn janet Tenorio se me                              (Representante)    administre anestesia srinivas el procedimiento, operacion o 5. Pacientes de obstetricia (maternidad) - Riesgos y consecuencias especificas de la anestesia malone o epiduralpueden incluir comeson baja presion, dificultad al orinar, dolor de lionel, disminucion del ritmo cardiaco del millie. Riesgos remotos incluyen: i __________________________________________________________    _________________________________________  ([address] [place] [address]                                                                                                                          Iain james

## (undated) NOTE — LETTER
Date: 2020    Patient Name: Hamilton Frankel   CHAVEZ 1990          To Whom it may concern:     This letter has been written at the patient's request. The above patient was triaged via virtual visit on 20 at the [facility] for tr